# Patient Record
Sex: MALE | Race: WHITE | Employment: FULL TIME | URBAN - METROPOLITAN AREA
[De-identification: names, ages, dates, MRNs, and addresses within clinical notes are randomized per-mention and may not be internally consistent; named-entity substitution may affect disease eponyms.]

---

## 2020-08-12 LAB — HBA1C MFR BLD HPLC: 8 %

## 2022-07-20 RX ORDER — ALLOPURINOL 100 MG/1
100 TABLET ORAL DAILY
Qty: 30 TABLET | Refills: 0 | Status: CANCELLED | OUTPATIENT
Start: 2022-07-20

## 2022-07-20 RX ORDER — ALLOPURINOL 100 MG/1
100 TABLET ORAL DAILY
Qty: 90 TABLET | Refills: 3 | Status: SHIPPED | OUTPATIENT
Start: 2022-07-20

## 2022-09-26 RX ORDER — TRIAMTERENE AND HYDROCHLOROTHIAZIDE 37.5; 25 MG/1; MG/1
TABLET ORAL
Qty: 90 TABLET | Refills: 0 | Status: SHIPPED | OUTPATIENT
Start: 2022-09-26

## 2022-12-26 RX ORDER — TRIAMTERENE AND HYDROCHLOROTHIAZIDE 37.5; 25 MG/1; MG/1
TABLET ORAL
Qty: 90 TABLET | Refills: 0 | OUTPATIENT
Start: 2022-12-26

## 2023-03-15 ENCOUNTER — HOSPITAL ENCOUNTER (INPATIENT)
Age: 68
LOS: 12 days | Discharge: HOME HEALTH | DRG: 215 | End: 2023-03-27
Attending: INTERNAL MEDICINE | Admitting: THORACIC SURGERY (CARDIOTHORACIC VASCULAR SURGERY)
Payer: COMMERCIAL

## 2023-03-15 ENCOUNTER — APPOINTMENT (OUTPATIENT)
Dept: NON INVASIVE DIAGNOSTICS | Age: 68
DRG: 215 | End: 2023-03-15
Payer: COMMERCIAL

## 2023-03-15 ENCOUNTER — HOSPITAL ENCOUNTER (EMERGENCY)
Age: 68
Discharge: ANOTHER HOSPITAL | End: 2023-03-15
Attending: EMERGENCY MEDICINE
Payer: COMMERCIAL

## 2023-03-15 ENCOUNTER — APPOINTMENT (OUTPATIENT)
Dept: GENERAL RADIOLOGY | Age: 68
End: 2023-03-15
Payer: COMMERCIAL

## 2023-03-15 ENCOUNTER — APPOINTMENT (OUTPATIENT)
Dept: ULTRASOUND IMAGING | Age: 68
DRG: 215 | End: 2023-03-15
Payer: COMMERCIAL

## 2023-03-15 VITALS
TEMPERATURE: 98 F | BODY MASS INDEX: 30.88 KG/M2 | HEIGHT: 72 IN | HEART RATE: 89 BPM | DIASTOLIC BLOOD PRESSURE: 90 MMHG | RESPIRATION RATE: 25 BRPM | WEIGHT: 228 LBS | OXYGEN SATURATION: 98 % | SYSTOLIC BLOOD PRESSURE: 186 MMHG

## 2023-03-15 DIAGNOSIS — I21.3 ST ELEVATION MYOCARDIAL INFARCTION (STEMI), UNSPECIFIED ARTERY (HCC): ICD-10-CM

## 2023-03-15 DIAGNOSIS — I25.10 CORONARY ARTERY DISEASE INVOLVING NATIVE HEART, UNSPECIFIED VESSEL OR LESION TYPE, UNSPECIFIED WHETHER ANGINA PRESENT: ICD-10-CM

## 2023-03-15 DIAGNOSIS — I44.1 MOBITZ (TYPE) I (WENCKEBACH'S) ATRIOVENTRICULAR BLOCK: ICD-10-CM

## 2023-03-15 DIAGNOSIS — R05.9 COUGH, UNSPECIFIED TYPE: ICD-10-CM

## 2023-03-15 DIAGNOSIS — I21.3 STEMI (ST ELEVATION MYOCARDIAL INFARCTION) (HCC): ICD-10-CM

## 2023-03-15 DIAGNOSIS — R07.9 CHEST PAIN, UNSPECIFIED TYPE: Primary | ICD-10-CM

## 2023-03-15 DIAGNOSIS — I21.4 ACUTE MYOCARDIAL INFARCTION, SUBENDOCARDIAL INFARCTION, INITIAL EPISODE OF CARE (HCC): ICD-10-CM

## 2023-03-15 DIAGNOSIS — Z95.1 S/P CABG X 4: ICD-10-CM

## 2023-03-15 DIAGNOSIS — I25.10 CORONARY ARTERY DISEASE INVOLVING NATIVE CORONARY ARTERY OF NATIVE HEART WITHOUT ANGINA PECTORIS: Primary | ICD-10-CM

## 2023-03-15 LAB
ALBUMIN SERPL-MCNC: 4.4 G/DL (ref 3.2–4.6)
ALBUMIN/GLOB SERPL: 1.1 (ref 0.4–1.6)
ALP SERPL-CCNC: 81 U/L (ref 45–117)
ALT SERPL-CCNC: 61 U/L (ref 13–61)
ANION GAP SERPL CALC-SCNC: 12 MMOL/L (ref 2–11)
APPEARANCE UR: CLEAR
AST SERPL-CCNC: 84 U/L (ref 15–37)
B PERT DNA SPEC QL NAA+PROBE: NOT DETECTED
BACTERIA SPEC CULT: ABNORMAL
BASOPHILS # BLD: 0 K/UL (ref 0–0.2)
BASOPHILS NFR BLD: 0 % (ref 0–2)
BILIRUB SERPL-MCNC: 0.4 MG/DL (ref 0.2–1.1)
BILIRUB UR QL: NEGATIVE
BORDETELLA PARAPERTUSSIS BY PCR: NOT DETECTED
BUN SERPL-MCNC: 35 MG/DL (ref 7–18)
C PNEUM DNA SPEC QL NAA+PROBE: NOT DETECTED
CALCIUM SERPL-MCNC: 9.9 MG/DL (ref 8.3–10.4)
CHLORIDE SERPL-SCNC: 103 MMOL/L (ref 98–107)
CO2 SERPL-SCNC: 24 MMOL/L (ref 21–32)
COLOR UR: NORMAL
CREAT SERPL-MCNC: 2.17 MG/DL (ref 0.8–1.5)
CREAT UR-MCNC: 45 MG/DL
DIFFERENTIAL METHOD BLD: ABNORMAL
ECHO AO ASC DIAM: 3.2 CM
ECHO AO ASCENDING AORTA INDEX: 1.42 CM/M2
ECHO AO ROOT DIAM: 3.4 CM
ECHO AO ROOT INDEX: 1.51 CM/M2
ECHO AV ACCELERATION TIME: 56 MS
ECHO AV AREA PEAK VELOCITY: 1.8 CM2
ECHO AV AREA VTI: 1.8 CM2
ECHO AV AREA/BSA PEAK VELOCITY: 0.8 CM2/M2
ECHO AV AREA/BSA VTI: 0.8 CM2/M2
ECHO AV MEAN GRADIENT: 12 MMHG
ECHO AV MEAN VELOCITY: 1.6 M/S
ECHO AV PEAK GRADIENT: 19 MMHG
ECHO AV PEAK VELOCITY: 2.2 M/S
ECHO AV VELOCITY RATIO: 0.5
ECHO AV VTI: 43.9 CM
ECHO BSA: 2.29 M2
ECHO BSA: 2.29 M2
ECHO IVC PROX: 1.8 CM
ECHO LA AREA 2C: 23.6 CM2
ECHO LA AREA 4C: 20.6 CM2
ECHO LA DIAMETER INDEX: 1.91 CM/M2
ECHO LA DIAMETER: 4.3 CM
ECHO LA MAJOR AXIS: 5.5 CM
ECHO LA MINOR AXIS: 5.6 CM
ECHO LA TO AORTIC ROOT RATIO: 1.26
ECHO LA VOL 2C: 79 ML (ref 18–58)
ECHO LA VOL 4C: 62 ML (ref 18–58)
ECHO LA VOL BP: 70 ML (ref 18–58)
ECHO LA VOL/BSA BIPLANE: 31 ML/M2 (ref 16–34)
ECHO LA VOLUME INDEX A2C: 35 ML/M2 (ref 16–34)
ECHO LA VOLUME INDEX A4C: 28 ML/M2 (ref 16–34)
ECHO LV E' LATERAL VELOCITY: 12 CM/S
ECHO LV E' SEPTAL VELOCITY: 10 CM/S
ECHO LV EDV A2C: 115 ML
ECHO LV EDV A4C: 136 ML
ECHO LV EDV INDEX A4C: 60 ML/M2
ECHO LV EDV NDEX A2C: 51 ML/M2
ECHO LV EJECTION FRACTION A2C: 59 %
ECHO LV EJECTION FRACTION A4C: 63 %
ECHO LV EJECTION FRACTION BIPLANE: 62 % (ref 55–100)
ECHO LV ESV A2C: 48 ML
ECHO LV ESV A4C: 50 ML
ECHO LV ESV INDEX A2C: 21 ML/M2
ECHO LV ESV INDEX A4C: 22 ML/M2
ECHO LV FRACTIONAL SHORTENING: 42 % (ref 28–44)
ECHO LV INTERNAL DIMENSION DIASTOLE INDEX: 2.31 CM/M2
ECHO LV INTERNAL DIMENSION DIASTOLIC: 5.2 CM (ref 4.2–5.9)
ECHO LV INTERNAL DIMENSION SYSTOLIC INDEX: 1.33 CM/M2
ECHO LV INTERNAL DIMENSION SYSTOLIC: 3 CM
ECHO LV IVSD: 1 CM (ref 0.6–1)
ECHO LV MASS 2D: 194.2 G (ref 88–224)
ECHO LV MASS INDEX 2D: 86.3 G/M2 (ref 49–115)
ECHO LV POSTERIOR WALL DIASTOLIC: 1 CM (ref 0.6–1)
ECHO LV RELATIVE WALL THICKNESS RATIO: 0.38
ECHO LVOT AREA: 3.5 CM2
ECHO LVOT AV VTI INDEX: 0.52
ECHO LVOT DIAM: 2.1 CM
ECHO LVOT MEAN GRADIENT: 3 MMHG
ECHO LVOT PEAK GRADIENT: 5 MMHG
ECHO LVOT PEAK VELOCITY: 1.1 M/S
ECHO LVOT STROKE VOLUME INDEX: 35.2 ML/M2
ECHO LVOT SV: 79.3 ML
ECHO LVOT VTI: 22.9 CM
ECHO MV A VELOCITY: 1.21 M/S
ECHO MV AREA VTI: 1.4 CM2
ECHO MV E DECELERATION TIME (DT): 107 MS
ECHO MV E VELOCITY: 1.48 M/S
ECHO MV E/A RATIO: 1.22
ECHO MV E/E' LATERAL: 12.33
ECHO MV E/E' RATIO (AVERAGED): 13.57
ECHO MV E/E' SEPTAL: 14.8
ECHO MV EROA PISA: 16.5 CM2
ECHO MV LVOT VTI INDEX: 2.48
ECHO MV MAX VELOCITY: 1.7 M/S
ECHO MV MEAN GRADIENT: 2 MMHG
ECHO MV MEAN VELOCITY: 0.6 M/S
ECHO MV PEAK GRADIENT: 11 MMHG
ECHO MV REGURGITANT ALIASING (NYQUIST) VELOCITY: 38 CM/S
ECHO MV REGURGITANT PEAK GRADIENT: 108 MMHG
ECHO MV REGURGITANT PEAK VELOCITY: 5.2 M/S
ECHO MV REGURGITANT RADIUS PISA: 0.6 CM
ECHO MV REGURGITANT VOLUME PISA: 2494.71 ML
ECHO MV REGURGITANT VTIA: 151 CM
ECHO MV VTI: 56.8 CM
ECHO PV ACCELERATION TIME (AT): 82 MS
ECHO PV MAX VELOCITY: 1 M/S
ECHO PV PEAK GRADIENT: 4 MMHG
ECHO RV BASAL DIMENSION: 3.2 CM
ECHO RV FREE WALL PEAK S': 15 CM/S
ECHO RV TAPSE: 1.5 CM (ref 1.7–?)
EKG ATRIAL RATE: 93 BPM
EKG DIAGNOSIS: NORMAL
EKG P AXIS: 58 DEGREES
EKG P-R INTERVAL: 224 MS
EKG Q-T INTERVAL: 364 MS
EKG QRS DURATION: 108 MS
EKG QTC CALCULATION (BAZETT): 390 MS
EKG R AXIS: -3 DEGREES
EKG T AXIS: 57 DEGREES
EKG VENTRICULAR RATE: 69 BPM
EOSINOPHIL # BLD: 0.1 K/UL (ref 0–0.8)
EOSINOPHIL NFR BLD: 2 % (ref 0.5–7.8)
ERYTHROCYTE [DISTWIDTH] IN BLOOD BY AUTOMATED COUNT: 13.2 % (ref 11.9–14.6)
FLUAV RNA SPEC QL NAA+PROBE: NOT DETECTED
FLUAV SUBTYP SPEC NAA+PROBE: NOT DETECTED
FLUBV RNA SPEC QL NAA+PROBE: NOT DETECTED
FLUBV RNA SPEC QL NAA+PROBE: NOT DETECTED
GLOBULIN SER CALC-MCNC: 3.9 G/DL (ref 2.8–4.5)
GLUCOSE BLD STRIP.AUTO-MCNC: 120 MG/DL (ref 65–100)
GLUCOSE BLD STRIP.AUTO-MCNC: 126 MG/DL (ref 65–100)
GLUCOSE BLD STRIP.AUTO-MCNC: 217 MG/DL (ref 65–100)
GLUCOSE SERPL-MCNC: 204 MG/DL (ref 65–100)
GLUCOSE UR STRIP.AUTO-MCNC: NEGATIVE MG/DL
HADV DNA SPEC QL NAA+PROBE: NOT DETECTED
HCOV 229E RNA SPEC QL NAA+PROBE: NOT DETECTED
HCOV HKU1 RNA SPEC QL NAA+PROBE: NOT DETECTED
HCOV NL63 RNA SPEC QL NAA+PROBE: NOT DETECTED
HCOV OC43 RNA SPEC QL NAA+PROBE: NOT DETECTED
HCT VFR BLD AUTO: 34.3 % (ref 41.1–50.3)
HGB BLD-MCNC: 11.4 G/DL (ref 13.6–17.2)
HGB UR QL STRIP: NEGATIVE
HMPV RNA SPEC QL NAA+PROBE: NOT DETECTED
HPIV1 RNA SPEC QL NAA+PROBE: NOT DETECTED
HPIV2 RNA SPEC QL NAA+PROBE: NOT DETECTED
HPIV3 RNA SPEC QL NAA+PROBE: NOT DETECTED
HPIV4 RNA SPEC QL NAA+PROBE: NOT DETECTED
IMM GRANULOCYTES # BLD AUTO: 0 K/UL (ref 0–0.5)
IMM GRANULOCYTES NFR BLD AUTO: 0 % (ref 0–5)
KETONES UR QL STRIP.AUTO: NEGATIVE MG/DL
LEUKOCYTE ESTERASE UR QL STRIP.AUTO: NEGATIVE
LV EF: 53 %
LVEF MODALITY: ABNORMAL
LYMPHOCYTES # BLD: 0.8 K/UL (ref 0.5–4.6)
LYMPHOCYTES NFR BLD: 12 % (ref 13–44)
M PNEUMO DNA SPEC QL NAA+PROBE: NOT DETECTED
MAGNESIUM SERPL-MCNC: 2.3 MG/DL (ref 1.2–2.6)
MCH RBC QN AUTO: 30.6 PG (ref 26.1–32.9)
MCHC RBC AUTO-ENTMCNC: 33.2 G/DL (ref 31.4–35)
MCV RBC AUTO: 92.2 FL (ref 82–102)
MONOCYTES # BLD: 0.6 K/UL (ref 0.1–1.3)
MONOCYTES NFR BLD: 9 % (ref 4–12)
NEUTS SEG # BLD: 5.2 K/UL (ref 1.7–8.2)
NEUTS SEG NFR BLD: 77 % (ref 43–78)
NITRITE UR QL STRIP.AUTO: NEGATIVE
NRBC # BLD: 0 K/UL (ref 0–0.2)
PH UR STRIP: 7 (ref 5–9)
PLATELET # BLD AUTO: 212 K/UL (ref 150–450)
PMV BLD AUTO: 10.5 FL (ref 9.4–12.3)
POTASSIUM SERPL-SCNC: 4.4 MMOL/L (ref 3.5–5.1)
PROT SERPL-MCNC: 8.3 G/DL (ref 6.4–8.2)
PROT UR STRIP-MCNC: NEGATIVE MG/DL
PROT UR-MCNC: 9 MG/DL
PROT/CREAT UR-RTO: 0.2
RBC # BLD AUTO: 3.72 M/UL (ref 4.23–5.6)
RSV RNA SPEC QL NAA+PROBE: NOT DETECTED
RV+EV RNA SPEC QL NAA+PROBE: NOT DETECTED
SARS-COV-2 RDRP RESP QL NAA+PROBE: NOT DETECTED
SARS-COV-2 RNA RESP QL NAA+PROBE: NOT DETECTED
SERVICE CMNT-IMP: ABNORMAL
SODIUM SERPL-SCNC: 139 MMOL/L (ref 133–143)
SOURCE: NORMAL
SP GR UR REFRACTOMETRY: 1.02 (ref 1–1.02)
TROPONIN I SERPL HS-MCNC: 6723.4 PG/ML (ref 0–57)
TROPONIN I SERPL HS-MCNC: 7040.5 PG/ML (ref 0–57)
TROPONIN T SERPL HS-MCNC: 1419 NG/L (ref 0–22)
UROBILINOGEN UR QL STRIP.AUTO: 1 EU/DL (ref 0.2–1)
WBC # BLD AUTO: 6.8 K/UL (ref 4.3–11.1)

## 2023-03-15 PROCEDURE — 0202U NFCT DS 22 TRGT SARS-COV-2: CPT

## 2023-03-15 PROCEDURE — 6360000002 HC RX W HCPCS: Performed by: EMERGENCY MEDICINE

## 2023-03-15 PROCEDURE — 99152 MOD SED SAME PHYS/QHP 5/>YRS: CPT | Performed by: INTERNAL MEDICINE

## 2023-03-15 PROCEDURE — 71045 X-RAY EXAM CHEST 1 VIEW: CPT

## 2023-03-15 PROCEDURE — 6360000002 HC RX W HCPCS: Performed by: INTERNAL MEDICINE

## 2023-03-15 PROCEDURE — 4A023N7 MEASUREMENT OF CARDIAC SAMPLING AND PRESSURE, LEFT HEART, PERCUTANEOUS APPROACH: ICD-10-PCS | Performed by: INTERNAL MEDICINE

## 2023-03-15 PROCEDURE — 84156 ASSAY OF PROTEIN URINE: CPT

## 2023-03-15 PROCEDURE — 93306 TTE W/DOPPLER COMPLETE: CPT | Performed by: INTERNAL MEDICINE

## 2023-03-15 PROCEDURE — B2111ZZ FLUOROSCOPY OF MULTIPLE CORONARY ARTERIES USING LOW OSMOLAR CONTRAST: ICD-10-PCS | Performed by: INTERNAL MEDICINE

## 2023-03-15 PROCEDURE — C1769 GUIDE WIRE: HCPCS | Performed by: INTERNAL MEDICINE

## 2023-03-15 PROCEDURE — 84484 ASSAY OF TROPONIN QUANT: CPT

## 2023-03-15 PROCEDURE — B2131ZZ FLUOROSCOPY OF MULTIPLE CORONARY ARTERY BYPASS GRAFTS USING LOW OSMOLAR CONTRAST: ICD-10-PCS | Performed by: INTERNAL MEDICINE

## 2023-03-15 PROCEDURE — 96374 THER/PROPH/DIAG INJ IV PUSH: CPT

## 2023-03-15 PROCEDURE — 85520 HEPARIN ASSAY: CPT

## 2023-03-15 PROCEDURE — 82962 GLUCOSE BLOOD TEST: CPT

## 2023-03-15 PROCEDURE — 6360000004 HC RX CONTRAST MEDICATION: Performed by: INTERNAL MEDICINE

## 2023-03-15 PROCEDURE — 2500000003 HC RX 250 WO HCPCS: Performed by: INTERNAL MEDICINE

## 2023-03-15 PROCEDURE — 87502 INFLUENZA DNA AMP PROBE: CPT

## 2023-03-15 PROCEDURE — 80053 COMPREHEN METABOLIC PANEL: CPT

## 2023-03-15 PROCEDURE — 93458 L HRT ARTERY/VENTRICLE ANGIO: CPT | Performed by: INTERNAL MEDICINE

## 2023-03-15 PROCEDURE — 6370000000 HC RX 637 (ALT 250 FOR IP): Performed by: NURSE PRACTITIONER

## 2023-03-15 PROCEDURE — 1100000003 HC PRIVATE W/ TELEMETRY

## 2023-03-15 PROCEDURE — 93306 TTE W/DOPPLER COMPLETE: CPT

## 2023-03-15 PROCEDURE — 99285 EMERGENCY DEPT VISIT HI MDM: CPT

## 2023-03-15 PROCEDURE — 4500000002 HC ER NO CHARGE: Performed by: THORACIC SURGERY (CARDIOTHORACIC VASCULAR SURGERY)

## 2023-03-15 PROCEDURE — 87641 MR-STAPH DNA AMP PROBE: CPT

## 2023-03-15 PROCEDURE — 99223 1ST HOSP IP/OBS HIGH 75: CPT | Performed by: INTERNAL MEDICINE

## 2023-03-15 PROCEDURE — 85025 COMPLETE CBC W/AUTO DIFF WBC: CPT

## 2023-03-15 PROCEDURE — 87635 SARS-COV-2 COVID-19 AMP PRB: CPT

## 2023-03-15 PROCEDURE — 2580000003 HC RX 258: Performed by: NURSE PRACTITIONER

## 2023-03-15 PROCEDURE — 2709999900 HC NON-CHARGEABLE SUPPLY: Performed by: INTERNAL MEDICINE

## 2023-03-15 PROCEDURE — 81003 URINALYSIS AUTO W/O SCOPE: CPT

## 2023-03-15 PROCEDURE — 6370000000 HC RX 637 (ALT 250 FOR IP)

## 2023-03-15 PROCEDURE — 36415 COLL VENOUS BLD VENIPUNCTURE: CPT

## 2023-03-15 PROCEDURE — 83735 ASSAY OF MAGNESIUM: CPT

## 2023-03-15 PROCEDURE — B2151ZZ FLUOROSCOPY OF LEFT HEART USING LOW OSMOLAR CONTRAST: ICD-10-PCS | Performed by: INTERNAL MEDICINE

## 2023-03-15 PROCEDURE — 76770 US EXAM ABDO BACK WALL COMP: CPT

## 2023-03-15 PROCEDURE — 96365 THER/PROPH/DIAG IV INF INIT: CPT

## 2023-03-15 PROCEDURE — C1894 INTRO/SHEATH, NON-LASER: HCPCS | Performed by: INTERNAL MEDICINE

## 2023-03-15 RX ORDER — INSULIN LISPRO 100 [IU]/ML
0-8 INJECTION, SOLUTION INTRAVENOUS; SUBCUTANEOUS
Status: DISCONTINUED | OUTPATIENT
Start: 2023-03-15 | End: 2023-03-20

## 2023-03-15 RX ORDER — SODIUM CHLORIDE 0.9 % (FLUSH) 0.9 %
5-40 SYRINGE (ML) INJECTION EVERY 12 HOURS SCHEDULED
Status: DISCONTINUED | OUTPATIENT
Start: 2023-03-15 | End: 2023-03-24 | Stop reason: SDUPTHER

## 2023-03-15 RX ORDER — MIDAZOLAM HYDROCHLORIDE 1 MG/ML
INJECTION INTRAMUSCULAR; INTRAVENOUS PRN
Status: DISCONTINUED | OUTPATIENT
Start: 2023-03-15 | End: 2023-03-15 | Stop reason: HOSPADM

## 2023-03-15 RX ORDER — NITROGLYCERIN 0.4 MG/1
0.4 TABLET SUBLINGUAL EVERY 5 MIN PRN
Status: DISCONTINUED | OUTPATIENT
Start: 2023-03-15 | End: 2023-03-24

## 2023-03-15 RX ORDER — ACETAMINOPHEN 325 MG/1
650 TABLET ORAL EVERY 6 HOURS PRN
Status: DISCONTINUED | OUTPATIENT
Start: 2023-03-15 | End: 2023-03-18

## 2023-03-15 RX ORDER — POTASSIUM CHLORIDE 20 MEQ/1
40 TABLET, EXTENDED RELEASE ORAL PRN
Status: DISCONTINUED | OUTPATIENT
Start: 2023-03-15 | End: 2023-03-27 | Stop reason: HOSPADM

## 2023-03-15 RX ORDER — ONDANSETRON 2 MG/ML
4 INJECTION INTRAMUSCULAR; INTRAVENOUS EVERY 6 HOURS PRN
Status: DISCONTINUED | OUTPATIENT
Start: 2023-03-15 | End: 2023-03-24 | Stop reason: SDUPTHER

## 2023-03-15 RX ORDER — ASPIRIN 81 MG/1
243 TABLET, CHEWABLE ORAL DAILY
Status: DISCONTINUED | OUTPATIENT
Start: 2023-03-15 | End: 2023-03-15 | Stop reason: HOSPADM

## 2023-03-15 RX ORDER — HEPARIN SODIUM 1000 [USP'U]/ML
4000 INJECTION, SOLUTION INTRAVENOUS; SUBCUTANEOUS ONCE
Status: COMPLETED | OUTPATIENT
Start: 2023-03-15 | End: 2023-03-15

## 2023-03-15 RX ORDER — ACETAMINOPHEN 650 MG/1
650 SUPPOSITORY RECTAL EVERY 6 HOURS PRN
Status: DISCONTINUED | OUTPATIENT
Start: 2023-03-15 | End: 2023-03-18

## 2023-03-15 RX ORDER — LANOLIN ALCOHOL/MO/W.PET/CERES
1000 CREAM (GRAM) TOPICAL DAILY
Status: DISCONTINUED | OUTPATIENT
Start: 2023-03-15 | End: 2023-03-15 | Stop reason: RX

## 2023-03-15 RX ORDER — SODIUM CHLORIDE 9 MG/ML
INJECTION, SOLUTION INTRAVENOUS PRN
Status: DISCONTINUED | OUTPATIENT
Start: 2023-03-15 | End: 2023-03-24 | Stop reason: SDUPTHER

## 2023-03-15 RX ORDER — FENOFIBRATE 160 MG/1
160 TABLET ORAL DAILY
Status: DISCONTINUED | OUTPATIENT
Start: 2023-03-16 | End: 2023-03-27 | Stop reason: HOSPADM

## 2023-03-15 RX ORDER — ROSUVASTATIN CALCIUM 20 MG/1
40 TABLET, COATED ORAL NIGHTLY
Status: DISCONTINUED | OUTPATIENT
Start: 2023-03-15 | End: 2023-03-17 | Stop reason: SDUPTHER

## 2023-03-15 RX ORDER — ALLOPURINOL 100 MG/1
100 TABLET ORAL DAILY
Status: DISCONTINUED | OUTPATIENT
Start: 2023-03-16 | End: 2023-03-18

## 2023-03-15 RX ORDER — ASPIRIN 81 MG/1
81 TABLET, CHEWABLE ORAL DAILY
COMMUNITY

## 2023-03-15 RX ORDER — HEPARIN SODIUM 10000 [USP'U]/100ML
5-30 INJECTION, SOLUTION INTRAVENOUS CONTINUOUS
Status: DISCONTINUED | OUTPATIENT
Start: 2023-03-15 | End: 2023-03-18

## 2023-03-15 RX ORDER — ASPIRIN 81 MG/1
TABLET, CHEWABLE ORAL
Status: COMPLETED
Start: 2023-03-15 | End: 2023-03-15

## 2023-03-15 RX ORDER — PANTOPRAZOLE SODIUM 40 MG/1
40 TABLET, DELAYED RELEASE ORAL
Status: DISCONTINUED | OUTPATIENT
Start: 2023-03-16 | End: 2023-03-16

## 2023-03-15 RX ORDER — SODIUM CHLORIDE 9 MG/ML
INJECTION, SOLUTION INTRAVENOUS CONTINUOUS
Status: DISCONTINUED | OUTPATIENT
Start: 2023-03-15 | End: 2023-03-24

## 2023-03-15 RX ORDER — HEPARIN SODIUM 1000 [USP'U]/ML
4000 INJECTION, SOLUTION INTRAVENOUS; SUBCUTANEOUS PRN
Status: DISCONTINUED | OUTPATIENT
Start: 2023-03-15 | End: 2023-03-18

## 2023-03-15 RX ORDER — ASPIRIN 81 MG/1
244 TABLET, CHEWABLE ORAL DAILY
Status: DISCONTINUED | OUTPATIENT
Start: 2023-03-15 | End: 2023-03-15

## 2023-03-15 RX ORDER — ONDANSETRON 4 MG/1
4 TABLET, ORALLY DISINTEGRATING ORAL EVERY 8 HOURS PRN
Status: DISCONTINUED | OUTPATIENT
Start: 2023-03-15 | End: 2023-03-24 | Stop reason: SDUPTHER

## 2023-03-15 RX ORDER — INSULIN LISPRO 100 [IU]/ML
0-4 INJECTION, SOLUTION INTRAVENOUS; SUBCUTANEOUS NIGHTLY
Status: DISCONTINUED | OUTPATIENT
Start: 2023-03-15 | End: 2023-03-20

## 2023-03-15 RX ORDER — HEPARIN SODIUM 200 [USP'U]/100ML
INJECTION, SOLUTION INTRAVENOUS CONTINUOUS PRN
Status: COMPLETED | OUTPATIENT
Start: 2023-03-15 | End: 2023-03-15

## 2023-03-15 RX ORDER — HEPARIN SODIUM 10000 [USP'U]/100ML
7 INJECTION, SOLUTION INTRAVENOUS CONTINUOUS
Status: DISCONTINUED | OUTPATIENT
Start: 2023-03-15 | End: 2023-03-15 | Stop reason: HOSPADM

## 2023-03-15 RX ORDER — HEPARIN SODIUM 1000 [USP'U]/ML
1000 INJECTION, SOLUTION INTRAVENOUS; SUBCUTANEOUS
Status: DISCONTINUED | OUTPATIENT
Start: 2023-03-15 | End: 2023-03-15

## 2023-03-15 RX ORDER — POLYETHYLENE GLYCOL 3350 17 G/17G
17 POWDER, FOR SOLUTION ORAL DAILY PRN
Status: DISCONTINUED | OUTPATIENT
Start: 2023-03-15 | End: 2023-03-24

## 2023-03-15 RX ORDER — DEXTROSE MONOHYDRATE 100 MG/ML
INJECTION, SOLUTION INTRAVENOUS CONTINUOUS PRN
Status: DISCONTINUED | OUTPATIENT
Start: 2023-03-15 | End: 2023-03-24 | Stop reason: SDUPTHER

## 2023-03-15 RX ORDER — POTASSIUM CHLORIDE 7.45 MG/ML
10 INJECTION INTRAVENOUS PRN
Status: DISCONTINUED | OUTPATIENT
Start: 2023-03-15 | End: 2023-03-24

## 2023-03-15 RX ORDER — SODIUM CHLORIDE 0.9 % (FLUSH) 0.9 %
5-40 SYRINGE (ML) INJECTION PRN
Status: DISCONTINUED | OUTPATIENT
Start: 2023-03-15 | End: 2023-03-24 | Stop reason: SDUPTHER

## 2023-03-15 RX ORDER — MAGNESIUM SULFATE IN WATER 40 MG/ML
2000 INJECTION, SOLUTION INTRAVENOUS PRN
Status: DISCONTINUED | OUTPATIENT
Start: 2023-03-15 | End: 2023-03-24

## 2023-03-15 RX ORDER — ATORVASTATIN CALCIUM 80 MG/1
80 TABLET, FILM COATED ORAL DAILY
COMMUNITY

## 2023-03-15 RX ORDER — LIDOCAINE HYDROCHLORIDE 10 MG/ML
INJECTION, SOLUTION INFILTRATION; PERINEURAL PRN
Status: DISCONTINUED | OUTPATIENT
Start: 2023-03-15 | End: 2023-03-15 | Stop reason: HOSPADM

## 2023-03-15 RX ORDER — HEPARIN SODIUM 1000 [USP'U]/ML
2000 INJECTION, SOLUTION INTRAVENOUS; SUBCUTANEOUS PRN
Status: DISCONTINUED | OUTPATIENT
Start: 2023-03-15 | End: 2023-03-18

## 2023-03-15 RX ORDER — LISINOPRIL 20 MG/1
20 TABLET ORAL DAILY
Status: CANCELLED | OUTPATIENT
Start: 2023-03-15

## 2023-03-15 RX ORDER — ASPIRIN 81 MG/1
81 TABLET, CHEWABLE ORAL DAILY
Status: DISCONTINUED | OUTPATIENT
Start: 2023-03-16 | End: 2023-03-17 | Stop reason: SDUPTHER

## 2023-03-15 RX ORDER — NITROGLYCERIN 0.4 MG/1
0.4 TABLET SUBLINGUAL EVERY 5 MIN PRN
Status: CANCELLED | OUTPATIENT
Start: 2023-03-15

## 2023-03-15 RX ORDER — MAGNESIUM HYDROXIDE/ALUMINUM HYDROXICE/SIMETHICONE 120; 1200; 1200 MG/30ML; MG/30ML; MG/30ML
30 SUSPENSION ORAL EVERY 6 HOURS PRN
Status: DISCONTINUED | OUTPATIENT
Start: 2023-03-15 | End: 2023-03-27 | Stop reason: HOSPADM

## 2023-03-15 RX ADMIN — SODIUM CHLORIDE: 9 INJECTION, SOLUTION INTRAVENOUS at 14:39

## 2023-03-15 RX ADMIN — ASPIRIN 243 MG: 81 TABLET, CHEWABLE ORAL at 12:00

## 2023-03-15 RX ADMIN — HEPARIN SODIUM 7 UNITS/KG/HR: 10000 INJECTION, SOLUTION INTRAVENOUS at 11:44

## 2023-03-15 RX ADMIN — SODIUM CHLORIDE, PRESERVATIVE FREE 5 ML: 5 INJECTION INTRAVENOUS at 21:18

## 2023-03-15 RX ADMIN — ROSUVASTATIN CALCIUM 40 MG: 20 TABLET, COATED ORAL at 21:18

## 2023-03-15 RX ADMIN — HEPARIN SODIUM 4000 UNITS: 1000 INJECTION INTRAVENOUS; SUBCUTANEOUS at 11:41

## 2023-03-15 ASSESSMENT — ENCOUNTER SYMPTOMS
WHEEZING: 0
NAUSEA: 0
RIGHT EYE: 0
LEFT EYE: 0
SORE THROAT: 0
SHORTNESS OF BREATH: 0
SPUTUM PRODUCTION: 0
HEMATEMESIS: 0
ORTHOPNEA: 0
BLURRED VISION: 0
VOMITING: 0
CHEST TIGHTNESS: 1
ABDOMINAL PAIN: 0
NAUSEA: 0
VOICE CHANGE: 0
DIARRHEA: 0
BACK PAIN: 0
COUGH: 1
COUGH: 1
EYE REDNESS: 0
BACK PAIN: 0
SHORTNESS OF BREATH: 1
HEMOPTYSIS: 0
HEARTBURN: 0
HEARTBURN: 0
SHORTNESS OF BREATH: 0
COLOR CHANGE: 0
COLOR CHANGE: 0
VOMITING: 0
EYES NEGATIVE: 1
ABDOMINAL PAIN: 0
CONSTIPATION: 0

## 2023-03-15 ASSESSMENT — PAIN SCALES - GENERAL: PAINLEVEL_OUTOF10: 0

## 2023-03-15 ASSESSMENT — PAIN - FUNCTIONAL ASSESSMENT: PAIN_FUNCTIONAL_ASSESSMENT: NONE - DENIES PAIN

## 2023-03-15 NOTE — PROGRESS NOTES
TRANSFER - OUT REPORT:    Verbal report given to [facility] on [facility]  being transferred to  for routine progression of patient care       Report consisted of patient's Situation, Background, Assessment and   Recommendations(SBAR). Information from the following report(s) Nurse Handoff Report was reviewed with the receiving nurse. [place] Assessment: No data recorded  Lines:   Peripheral IV 03/15/23 Right Antecubital (Active)   Site Assessment Clean, dry & intact 03/15/23 1108       Peripheral IV 03/15/23 Left Antecubital (Active)   Site Assessment Clean, dry & intact 03/15/23 1134   Dressing Status Clean, dry & intact 03/15/23 1134        Opportunity for questions and clarification was provided.       Patient transported with:  Registered Nurse

## 2023-03-15 NOTE — PROGRESS NOTES
Report received from [address] [place] [address]. Procedural findings communicated. Intra procedural  medication administration reviewed. Progression of care discussed.      Patient received into [id number] [facility] post sheath removal.     Access site without bleeding or swelling yes    Dressing dry and intact yes    Patient instructed to limit movement to right upper extremity    Routine post procedural vital signs and site assessment initiated yes

## 2023-03-15 NOTE — Clinical Note
Aspirin Registry Question:   Aspirin was given prior to the procedure.    [email or web address]

## 2023-03-15 NOTE — ED TRIAGE NOTES
Patient ambulatory to ED with complaint of chest pain that started Saturday. Patient was walking around work yesterday when he was in the back of the building and felt as though he couldn't make it back to the front of the building. Patient took a rest and was able to catch his breath and felt better. Patient's cardiologist isn't available at this time and advised to go to the ER.  Patient states he feels like he was sucker punched in the chest.

## 2023-03-15 NOTE — CONSULTS
Comprehensive Nutrition Assessment    Type and Reason for Visit: Initial, Consult  Diet Education (Cardiology)    Nutrition Recommendations/Plan:   Nutrition Education    Educated on Carbohydrate counting for diabetes and low sodium diet for heart health  Learners: Patient and Family  Readiness: Acceptance  Method: Explanation and Handout  Response: Verbalizes Understanding    Meals and Snacks:  Diet: Continue current order  Nutrition Supplement Therapy:  Medical food supplement therapy:  None - not indicated at this time     Malnutrition Assessment:  Malnutrition Status: No malnutrition  no norman wasting of fat or muscle stores noted    Nutrition Assessment:  Nutrition History: Pt reports prior education on diabetic/heart healthy diet but admits to limited adherence to these recommendations. States he likes a lot of foods that do not meet these guidelines and he would rather continue eating these foods. A normal day is coffee for breakfast, poptarts or powedered sugar donuts at work, small lunch, and meat and starch dinner with family in the evening. Pt states he tends to graze throughout the day and does not follow any specific type of diet. Denies any appetite changes. States his weight has been increasing the last few months over the winter d/t decrease in exercise. Do You Have Any Cultural, Yazidism, or Ethnic Food Preferences?: No   Nutrition Background:       PMH significant for T2DM, GERD, gout, hypercholesterolemia, and thyroid disease. Pt presents this admission for NSTEMI. Nutrition Interval:  RD met w/pt and family in room. Completed diabetic carbohydrate counting education and heart healthy/low sodium diet education. Pt verbalizes understanding to materials discussed. Pt given handouts to review and take home with him. Recommended ADA (American Diabetes Association) and AHA (American Heart Association) for recipes to help implement dietary changes at home.      Current Nutrition Therapies:  Diet

## 2023-03-15 NOTE — PROCEDURES
Brief Cardiac Procedure Note    Patient: Jeannie Lake MRN: [id number]  SSN: [id number]    YOB: 1955  Age: 74 y.o. Sex: male      Date of Procedure: 3/15/2023     Pre-procedure Diagnosis: NSTEMI    Post-procedure Diagnosis: Coronary Artery Disease    Procedure: Left Heart Catheterization    Brief Description of Procedure: As above    Performed By: Jacqueline Larson MD     Assistants: None    Anesthesia: Moderate Sedation    Estimated Blood Loss: Less than 10 mL      Specimens: None    Implants: None    Findings: Severe multivessel coronary artery disease. Occluded mid circumflex which appears to be a left dominant circulation. High-grade OM and LAD stenosis. Appears his mild inferior ST elevation is likely due to recent infarct and akinetic/dyskinetic inferior wall. Patient is pain-free without any ongoing angina for last 24 hours. Left ventriculogram shows inferior dyskinesis. Discussed with CT surgery. Would be best served with multivessel coronary artery bypass grafting. We will admit and placed on aspirin until surgery can be performed. Check echo. Complications: None    Recommendations: Continue medical therapy.     Signed By: Jacqueline Larson MD     March 15, 2023

## 2023-03-15 NOTE — ED NOTES
TRANSFER - OUT REPORT:    Verbal report given to lynda mars on Theamos Wasserman  being transferred to [facility] for urgent transfer       Report consisted of patient's Situation, Background, Assessment and   Recommendations(SBAR). Information from the following report(s) Nurse Handoff Report, ED SBAR, Med Rec Status, and Cardiac Rhythm    was reviewed with the receiving nurse. [place] Assessment: No data recorded  Lines:   Peripheral IV 03/15/23 Right Antecubital (Active)   Site Assessment Clean, dry & intact 03/15/23 1108       Peripheral IV 03/15/23 Left Antecubital (Active)   Site Assessment Clean, dry & intact 03/15/23 1134   Dressing Status Clean, dry & intact 03/15/23 1134        Opportunity for questions and clarification was provided. Patient transported with:  Monitor.  IV pump and with [facility]          Carolyn Villarreal RN  03/15/23 1202

## 2023-03-15 NOTE — ED PROVIDER NOTES
Emergency Department Provider Note                   PCP:                Arnaud Velazquez MD               Age: 74 y.o. Sex: male     DISPOSITION Decision To Transfer 03/15/2023 11:42:35 AM       ICD-10-CM    1. Chest pain, unspecified type  R07.9       2. ST elevation myocardial infarction (STEMI), unspecified artery (HCC)  I21.3           MEDICAL DECISION MAKING  Complexity of Problems Addressed:  1 or more acute illnesses that pose a threat to life or bodily function. Patient does appear to be in a second-degree AV block with occasional widening and a drop beat. However he also appears to have some mild ST elevation in the inferior leads. I have no previous EKG for comparison and he is pain-free at this time. I will try to await a previous EKG for his primary cardiologist office I will not initiate a code STEMI at this time    11:42 AM  I discussed the case with on-call interventional cardiologist decision was made to call a STEMI on patient. I will initiate heparin. Critical Care Time 60 Minutes: Excluding all billable procedures, time spent at the bedside directing patients resucsitation, updating family, and coordinating care with consultants      Data Reviewed and Analyzed:  Category 1:   I reviewed records from an external source: ED records from outside this hospital.  I reviewed records from an external source: provider visit notes from PCP. I reviewed records from an external source: provider visit notes from outside specialist.  I reviewed records from an external source: previous old EKG's reviewed. I reviewed records from an external source: previous lab results from outside ED. I reviewed records from an external source: previous imaging studies from outside ED.   I ordered each unique test.  I reviewed the results of each unique test.    The patients assessment required an independent historian: Daughter at bed side give supplemental history    Category 2:   I independently ordered and reviewed the EKG. I independently ordered and reviewed the X-rays. No obvious pneumonia  I independently ordered and reviewed the labs show a normal white blood cell count elevated troponin    EKG interpretation: Sinus rhythm rate of 69, prolonged SC interval, secondary AV block, Mobitz type I. Some Minimal ST elevation noted in inferior leads. No previous EKG for comparison    Category 3: Discussion of management or test interpretation. Discussed the need for emergent treatment for patient       Risk of Complications and/or Morbidity of Patient Management:  Discussion with external consultants and Shared medical decision making was utilized in creating the patients health plan today. Bladimir Haley is a 74 y.o. male who presents to the Emergency Department with chief complaint of    Chief Complaint   Patient presents with    Chest Pain      Patient presents the ER with complaints of chest pain, cough, fever and shortness of breath. Patient states symptoms started 3 nights ago. Reports he did have some fever then. States intermittently has had some chest discomfort. Patient's last episode of chest discomfort was last evening after taking out the trash. Does report a cardiac history. Reports minimal cough. Denies vomiting. The history is provided by the patient. Chest Pain  Pain location:  Substernal area  Pain quality: pressure    Pain radiates to:  Does not radiate  Pain severity:  Moderate  Onset quality:  Gradual  Duration:  3 days  Timing:  Intermittent  Chronicity:  New  Context: not breathing    Relieved by:  Nothing  Worsened by:  Nothing  Associated symptoms: cough and fever    Associated symptoms: no abdominal pain, no back pain, no heartburn, no lower extremity edema, no nausea, no shortness of breath, no syncope, no vomiting and no weakness       Review of Systems   Constitutional:  Positive for fever. Negative for chills.    HENT:  Negative for congestion, dental problem, tinnitus and voice change. Eyes:  Negative for redness and visual disturbance. Respiratory:  Positive for cough and chest tightness. Negative for shortness of breath. Cardiovascular:  Positive for chest pain. Negative for syncope. Gastrointestinal:  Negative for abdominal pain, heartburn, nausea and vomiting. Endocrine: Negative for polydipsia and polyuria. Genitourinary:  Negative for enuresis, penile swelling and urgency. Musculoskeletal:  Negative for back pain and gait problem. Skin:  Negative for color change and pallor. Neurological:  Negative for tremors and weakness. Hematological:  Negative for adenopathy. Does not bruise/bleed easily. Psychiatric/Behavioral:  Negative for behavioral problems and confusion. All other systems reviewed and are negative. Vitals signs and nursing note reviewed. Patient Vitals for the past 4 hrs:   Temp Pulse Resp BP SpO2   03/15/23 1115 -- 67 22 122/70 96 %   03/15/23 1100 -- 71 23 121/62 98 %   03/15/23 1050 98 °F (36.7 °C) 79 17 (!) 143/82 96 %          Physical Exam  Vitals and nursing note reviewed. Constitutional:       General: He is not in acute distress. Appearance: Normal appearance. He is not ill-appearing. HENT:      Head: Normocephalic and atraumatic. Right Ear: External ear normal.      Left Ear: External ear normal.   Eyes:      Extraocular Movements: Extraocular movements intact. Pupils: Pupils are equal, round, and reactive to light. Cardiovascular:      Rate and Rhythm: Normal rate and regular rhythm. Pulses: Normal pulses. Heart sounds: Normal heart sounds. Pulmonary:      Effort: Pulmonary effort is normal.      Breath sounds: Normal breath sounds. Abdominal:      General: Abdomen is flat. There is no distension. Palpations: Abdomen is soft. There is no mass. Musculoskeletal:         General: No swelling, tenderness, deformity or signs of injury. Normal range of motion.       Cervical back: Normal range of motion and neck supple. No rigidity or tenderness. Skin:     Coloration: Skin is not jaundiced or pale. Neurological:      General: No focal deficit present. Mental Status: He is alert and oriented to person, place, and time. Cranial Nerves: No cranial nerve deficit. Sensory: No sensory deficit. Psychiatric:         Mood and Affect: Mood normal.        Procedures     Orders Placed This Encounter   Procedures    Influenza A/B, Molecular    COVID-19, Rapid    XR CHEST PORTABLE    CBC with Auto Differential    CMP    Troponin T    Magnesium    EKG 12 Lead        Medications   heparin 25,000 units in dextrose 5% 250 mL (premix) infusion (has no administration in time range)   heparin (porcine) injection 4,000 Units (has no administration in time range)       New Prescriptions    No medications on file        Past Medical History:   Diagnosis Date    Diabetes ([address]     GERD (gastroesophageal reflux disease)     Gout     Hypercholesterolemia     Thyroid disease         Past Surgical History:   Procedure Laterality Date    CARDIAC SURGERY      [facility] UNLISTED PROCEDURE CARDIAC SURGERY  2005    M. I. History reviewed. No pertinent family history. Social History     Socioeconomic History    Marital status:      Spouse name: None    Number of children: None    Years of education: None    Highest education level: None   Tobacco Use    Smoking status: Never    Smokeless tobacco: Never   Substance and Sexual Activity    Alcohol use: Yes    Drug use: No        Allergies: Patient has no known allergies. Previous Medications    ALLOPURINOL (ZYLOPRIM) 100 MG TABLET    Take 1 tablet by mouth in the morning.     CYANOCOBALAMIN 1000 MCG TABLET    Take 1,000 mcg by mouth daily    DAPAGLIFLOZIN (FARXIGA) 10 MG TABLET    Take 10 mg by mouth daily    FENOFIBRATE (TRIGLIDE) 160 MG TABLET    Take 160 mg by mouth daily    INSULIN ASPART (NOVOLOG) 100 UNIT/ML INJECTION VIAL    Inject into the skin as needed    INSULIN DEGLUDEC 100 UNIT/ML SOPN    Inject 70 Units into the skin    LEVOTHYROXINE (SYNTHROID) 175 MCG TABLET    Take 175 mcg by mouth every morning (before breakfast)    LIRAGLUTIDE (VICTOZA) 18 MG/3ML SOPN SC INJECTION    Inject 1.8 mg into the skin    LISINOPRIL (PRINIVIL;ZESTRIL) 20 MG TABLET    Take one tab daily. METFORMIN (GLUCOPHAGE) 1000 MG TABLET    Take 1,000 mg by mouth 2 times daily (with meals)    NITROGLYCERIN (NITROSTAT) 0.4 MG SL TABLET    Use as directed for chest pain    OMEPRAZOLE (PRILOSEC) 20 MG DELAYED RELEASE CAPSULE    Take 20 mg by mouth daily    SEMAGLUTIDE, 1 MG/DOSE, (OZEMPIC, 1 MG/DOSE,) 2 MG/1.5ML SOPN    Inject 1 mg into the skin every 7 days    TRIAMTERENE-HYDROCHLOROTHIAZIDE (MAXZIDE-25) 37.5-25 MG PER TABLET    TAKE ONE TABLET BY MOUTH ONE TIME DAILY        Results for orders placed or performed during the hospital encounter of 03/15/23   Influenza A/B, Molecular    Specimen: Not Specified   Result Value Ref Range    Influenza A, JONY Not detected NOTD      Influenza B, JONY Not detected NOTD     COVID-19, Rapid    Specimen: Nasopharyngeal   Result Value Ref Range    Source Nasopharyngeal      SARS-CoV-2, Rapid Not detected NOTD     XR CHEST PORTABLE    Narrative    History: Chest pain    Exam: portable chest    Comparison: None    Findings: The lungs are clear.  The mediastinal contour and osseous structures  are normal.    IMPRESSIONs: No acute findings       CBC with Auto Differential   Result Value Ref Range    WBC 6.8 4.3 - 11.1 K/uL    RBC 3.72 (L) 4.23 - 5.60 M/uL    Hemoglobin 11.4 (L) 13.6 - 17.2 g/dL    Hematocrit 34.3 (L) 41.1 - 50.3 %    MCV 92.2 82.0 - 102.0 FL    MCH 30.6 26.1 - 32.9 PG    MCHC 33.2 31.4 - 35.0 g/dL    RDW 13.2 11.9 - 14.6 %    Platelets 297 068 - 762 K/uL    MPV 10.5 9.4 - 12.3 FL    nRBC 0.00 0.0 - 0.2 K/uL    Differential Type AUTOMATED      Seg Neutrophils 77 43 - 78 %    Lymphocytes 12 (L) 13 - 44 %    Monocytes 9 4.0 - 12.0 %    Eosinophils % 2 0.5 - 7.8 %    Basophils 0 0.0 - 2.0 %    Immature Granulocytes 0 0.0 - 5.0 %    Segs Absolute 5.2 1.7 - 8.2 K/UL    Absolute Lymph # 0.8 0.5 - 4.6 K/UL    Absolute Mono # 0.6 0.1 - 1.3 K/UL    Absolute Eos # 0.1 0.0 - 0.8 K/UL    Basophils Absolute 0.0 0.0 - 0.2 K/UL    Absolute Immature Granulocyte 0.0 0.0 - 0.5 K/UL   Troponin T   Result Value Ref Range    Troponin T 1419.0 () 0 - 22 ng/L   EKG 12 Lead   Result Value Ref Range    Ventricular Rate 69 BPM    Atrial Rate 93 BPM    P-R Interval 224 ms    QRS Duration 108 ms    Q-T Interval 364 ms    QTc Calculation (Bazett) 390 ms    P Axis 58 degrees    R Axis -3 degrees    T Axis 57 degrees    Diagnosis       Sinus rhythm with mobitz type 1 second degree AV block        XR CHEST PORTABLE   Final Result                        Voice dictation software was used during the making of this note. This software is not perfect and grammatical and other typographical errors may be present. This note has not been completely proofread for errors.      Ryanne Lanier MD  03/15/23 6776

## 2023-03-15 NOTE — PROGRESS NOTES
Skin assessment completed with Rafiq Baumann RN. Overall skin dry and intact with scattered scarring. R radial puncture site s/p lhc. Sacrum and heels intact. Encouraged repositioning.

## 2023-03-15 NOTE — PROGRESS NOTES
Addendum- patient clearly states he does not want CABG, all questions answered, will have cardiology discuss PCI options with him

## 2023-03-15 NOTE — PROGRESS NOTES
[address] cardiology    I discussed with patient his coronary anatomy and the limitations to percutaneous revascularization given his multivessel disease, ostial circumflex stenosis and diffuse LAD stenosis with occluded left dominant circumflex. We discussed the survival advantage of bypass in patients with diabetes and multivessel coronary artery disease. Based on our discussion, he is willing to proceed with coronary bypass grafting. I will discuss this with Dr. Dalton Sanchez. Tentatively scheduled for Friday.     Marilee Landon MD

## 2023-03-15 NOTE — ED NOTES
Report called to lynda mars at cath lab     [employer], [facility]  03/15/23 7284

## 2023-03-15 NOTE — H&P
[facility] Cardiology H and P                Date of  Admission: 3/15/2023 12:24 PM     PCP: Skyler Hong MD  Primary Cardiologist: Dr Noel Hoskins. [address] [facility]  [facility] Attending: Dr Diallo Bose    CC: CP with STEMI activation      Leilani Calvo is a 74 y.o. male with hx of DM, GERD, Gout, HLD, CAD s/p PCI to OM1 OM2 OM3, DM on insulin, obesity, and thyroid disease. The patient started to have sub sternal chest pain over the weekend. This pain did not go away and he went to [facility] ED. He has associated complaints of subjective fevers, SOB, and cough as well. He is noted to have 2nd Degree HB type 1 with some ST segment changes. After confirmation from previous EKG from [facility] a code STEMI was activated. After 4k heparin, 324 mg ASA, and SL nitro the patient was chest pain free prior to arrival.  He has no other complaints at the time of going directly to the CCL for urgent intervention. Review of his labs from [facility] show Elevated HS Trop above 1.4 K, Cr 2.17, and WBC 11.4. Recent Cardiac Synopsis  [facility] hx: PCI to om1 om2 om3  w/ C  without intervention  Echo: Pending  EKnd Degree HB       Past Medical History:   Diagnosis Date    Diabetes ([address]     GERD (gastroesophageal reflux disease)     Gout     Hypercholesterolemia     Thyroid disease       Past Surgical History:   Procedure Laterality Date    CARDIAC SURGERY      [place] UNLISTED PROCEDURE CARDIAC SURGERY      M. I.     No Known Allergies   No family history on file.    Social History       Tobacco History       Smoking Status  Never      Smokeless Tobacco Use  Never              Alcohol History       Alcohol Use Status  Yes              Drug Use       Drug Use Status  No              Sexual Activity       Sexually Active  Not Asked                     Medications Prior to Admission: triamterene-hydroCHLOROthiazide (MAXZIDE-25) 37.5-25 MG per tablet, TAKE ONE TABLET BY MOUTH ONE TIME DAILY  allopurinol (ZYLOPRIM) 100 MG tablet, mg/dL    BUN 35 (H) 7.0 - 18.0 MG/DL    Creatinine 2.17 (H) 0.8 - 1.5 MG/DL    Est, Glom Filt Rate 33 (L) >60 ml/min/1.73m2    Calcium 9.9 8.3 - 10.4 MG/DL    Total Bilirubin 0.4 0.2 - 1.1 MG/DL    ALT 61 13.0 - 61.0 U/L    AST 84 (H) 15 - 37 U/L    Alk Phosphatase 81 45.0 - 117.0 U/L    Total Protein 8.3 (H) 6.4 - 8.2 g/dL    Albumin 4.4 3.2 - 4.6 g/dL    Globulin 3.9 2.8 - 4.5 g/dL    Albumin/Globulin Ratio 1.1 0.4 - 1.6     Influenza A/B, Molecular    Collection Time: 03/15/23 10:57 AM    Specimen: Not Specified   Result Value Ref Range    Influenza A, JONY Not detected NOTD      Influenza B, JONY Not detected NOTD     Troponin T    Collection Time: 03/15/23 10:57 AM   Result Value Ref Range    Troponin T 1419.0 (HH) 0 - 22 ng/L   Magnesium    Collection Time: 03/15/23 10:57 AM   Result Value Ref Range    Magnesium 2.3 1.2 - 2.6 mg/dL   COVID-19, Rapid    Collection Time: 03/15/23 10:57 AM    Specimen: Nasopharyngeal   Result Value Ref Range    Source Nasopharyngeal      SARS-CoV-2, Rapid Not detected NOTD     EKG 12 Lead    Collection Time: 03/15/23 10:59 AM   Result Value Ref Range    Ventricular Rate 69 BPM    Atrial Rate 93 BPM    P-R Interval 224 ms    QRS Duration 108 ms    Q-T Interval 364 ms    QTc Calculation (Bazett) 390 ms    P Axis 58 degrees    R Axis -3 degrees    T Axis 57 degrees    Diagnosis       Sinus rhythm with mobitz type 1 second degree AV block        XR CHEST PORTABLE    Result Date: 3/15/2023  History: Chest pain Exam: portable chest Comparison: None Findings: The lungs are clear. The mediastinal contour and osseous structures are normal. IMPRESSIONs: No acute findings           Initial Recommendations:      Cardiac Problems:    NSTEMI:  likely infarct started over the weekend with Q waves seen by EKG. Pt is now CP free.  Take for emergent LHC, check echo, daily labs, trend trop x2, Cardiac/DM diet, diabetic education, dietition consult, Heparin Drip, NO BB with HB, check lipid panel,check

## 2023-03-15 NOTE — CONSULTS
Raymond Castillo. MD Fabian Lyonsl Tone. Kristan Pierre MD           CONSULT NOTE    Geoffrey Lara         3/15/2023        1955    REFERRING PHYSICIAN:  Dr. Lui Auguste:  The patient is a 74 y.o. male who was admitted for STEMI (ST elevation myocardial infarction) ([facility] [address] [I21.3]. He presented to the ER at [facility] with chest pain. His chest pain initially started on Saturday evening. He felt poorly and laid down due to symptoms. He also noted a low grade temp, cough and headache. He continued to feel poorly on Saturday. On Sunday evening, he had recurrent chest pain, low grade temp and general malaise. On Monday, he noted he had to stop and rest after mild exertion. He continued to have intermittent chest pain. He called [facility] today regarding symptoms and was offered an appointment but also referred to the ER as well. He presented to the ER in [facility]. Troponin was elevated. EKG was abnormal with no prior EKG available for review. STEMI was called and he was transported to [facility] for emergent cardiac catheterization. LHC showed severe multivessel disease. Echocardiogram is pending. Creatinine elevated as well. He denies any prior history of renal disease and believes he may just be dehydrated. He has been diabetic since 2005. He had an MI/PCI in 2005. He has FH of CAD. Risk factors include DM, HTN, dyslipidemia    No history of stroke, TIA, prior cardiac surgery, prior vascular surgery, anesthetic complication, lung disease, DVT or PE, GI bleeding       Past Medical History:   Diagnosis Date    Diabetes ([facility] [address]     GERD (gastroesophageal reflux disease)     Gout     Hypercholesterolemia     Thyroid disease        Past Surgical History:   Procedure Laterality Date    CARDIAC SURGERY      [place] UNLISTED PROCEDURE CARDIAC SURGERY  2005    M. I. No family history on file.     Social History     Socioeconomic History    Marital status:      Spouse name: Not on file    Number of children: Not on file    Years of education: Not on file    Highest education level: Not on file   Occupational History    Not on file   Tobacco Use    Smoking status: Never    Smokeless tobacco: Never   Substance and Sexual Activity    Alcohol use: Yes    Drug use: No    Sexual activity: Not on file   Other Topics Concern    Not on file   Social History Narrative    Not on file     Social Determinants of Health     Financial Resource Strain: Not on file   Food Insecurity: Not on file   Transportation Needs: Not on file   Physical Activity: Not on file   Stress: Not on file   Social Connections: Not on file   Intimate Partner Violence: Not on file   Housing Stability: Not on file       No Known Allergies    No current facility-administered medications on file prior to encounter. Current Outpatient Medications on File Prior to Encounter   Medication Sig Dispense Refill    triamterene-hydroCHLOROthiazide (MAXZIDE-25) 37.5-25 MG per tablet TAKE ONE TABLET BY MOUTH ONE TIME DAILY 90 tablet 0    allopurinol (ZYLOPRIM) 100 MG tablet Take 1 tablet by mouth in the morning. 90 tablet 3    cyanocobalamin 1000 MCG tablet Take 1,000 mcg by mouth daily      dapagliflozin (FARXIGA) 10 MG tablet Take 10 mg by mouth daily      fenofibrate (TRIGLIDE) 160 MG tablet Take 160 mg by mouth daily      insulin aspart (NOVOLOG) 100 UNIT/ML injection vial Inject into the skin as needed      Insulin Degludec 100 UNIT/ML SOPN Inject 70 Units into the skin      levothyroxine (SYNTHROID) 175 MCG tablet Take 175 mcg by mouth every morning (before breakfast)      Liraglutide (VICTOZA) 18 MG/3ML SOPN SC injection Inject 1.8 mg into the skin      lisinopril (PRINIVIL;ZESTRIL) 20 MG tablet Take one tab daily.       metFORMIN (GLUCOPHAGE) 1000 MG tablet Take 1,000 mg by mouth 2 times daily (with meals)      nitroGLYCERIN (NITROSTAT) 0.4 MG SL tablet Use as directed for chest pain      omeprazole (PRILOSEC) 20 MG delayed release capsule Take 20 mg

## 2023-03-15 NOTE — PROGRESS NOTES
TRANSFER - IN REPORT:    Verbal report received from [facility] on [place]annie [place] being received from cath lab for routine progression of patient care      Report consisted of patients Situation, Background, Assessment and Recommendations(SBAR). Information from the following report(s) SBAR was reviewed with the receiving nurse. Opportunity for questions and clarification was provided. Assessment completed upon patients arrival to unit and care assumed.

## 2023-03-15 NOTE — PROGRESS NOTES
TRANSFER - OUT REPORT:    Verbal report given to RN on Demetrius Perez  being transferred to [facility] for routine progression of patient care       Report consisted of patient's Situation, Background, Assessment and   Recommendations(SBAR). Information from the following report(s) Nurse Handoff Report was reviewed with the receiving nurse.     STEMI w/ Dr. Reyna Sy  CV Surgery consult  R radial  TR band 12 mls  4 mg versed  50 mcg fentanyl

## 2023-03-15 NOTE — PROGRESS NOTES
Radial compression band removed at 1544 after slowly reducing air from 14 cc to zero as per hospital protocol. No bleeding or hematoma noted. 2 x 2 gauze with tegaderm placed over puncture site. The affected extremity is warm and dry to the touch. Frequent vital signs printed and placed on bedside chart. Patient instructed to call if any bleeding noted on gauze. Patient verbalized understanding the nursing instructions.

## 2023-03-15 NOTE — DIABETES MGMT
Patient admitted with MI, planned for possible CABG on Friday. Admitting blood glucose 204. Noted patient A1c ordered for tomorrow. Creatinine 2.17. GFR 33. Reviewed patient current regimen: Humalog correctional insulin. Per chart review patient has diet with 4 carb choices ordered and is to be NPO after midnight. Patient would likely benefit from low dose basal insulin while NPO to help improve glycemic control. Provider updated via [employer] regarding recommendations and patient glycemic control. Per provider patient not to be NPO anymore until CABG. Requested POC glucose as patient may need higher insulin dosing pending appetite. Update at 1512: most recent FSBS 126. Pending A1c, patient appetite, and AM glycemic control provider could consider starting Lantus tomorrow. Provider updated via [employer].

## 2023-03-15 NOTE — Clinical Note
----- Message from Leo Wiley sent at 8/5/2020 11:36 AM EDT -----  Subject: Message to Provider    QUESTIONS  Information for Provider? patient wants a refill on hydrocodone 5-325 mg  ---------------------------------------------------------------------------  --------------  CALL BACK INFO  What is the best way for the office to contact you? OK to leave message on   voicemail  Preferred Call Back Phone Number? [phone]  ---------------------------------------------------------------------------  --------------  SCRIPT ANSWERS  Relationship to Patient?  Self Sheath was inserted in the right radial artery.

## 2023-03-16 ENCOUNTER — APPOINTMENT (OUTPATIENT)
Dept: GENERAL RADIOLOGY | Age: 68
DRG: 215 | End: 2023-03-16
Payer: COMMERCIAL

## 2023-03-16 ENCOUNTER — APPOINTMENT (OUTPATIENT)
Dept: ULTRASOUND IMAGING | Age: 68
DRG: 215 | End: 2023-03-16
Payer: COMMERCIAL

## 2023-03-16 ENCOUNTER — PREP FOR PROCEDURE (OUTPATIENT)
Dept: CARDIOTHORACIC SURGERY | Age: 68
End: 2023-03-16

## 2023-03-16 PROBLEM — Z95.5 HISTORY OF CORONARY ARTERY STENT PLACEMENT: Status: ACTIVE | Noted: 2023-03-16

## 2023-03-16 PROBLEM — J96.01 ACUTE RESPIRATORY FAILURE WITH HYPOXIA (HCC): Status: ACTIVE | Noted: 2023-03-16

## 2023-03-16 PROBLEM — I49.9 VENTRICULAR ARRHYTHMIA: Status: ACTIVE | Noted: 2023-03-16

## 2023-03-16 PROBLEM — I46.9 CARDIAC ARREST (HCC): Status: ACTIVE | Noted: 2023-03-16

## 2023-03-16 LAB
ALBUMIN SERPL-MCNC: 2.6 G/DL (ref 3.2–4.6)
ALBUMIN SERPL-MCNC: 3.4 G/DL (ref 3.2–4.6)
ALBUMIN/GLOB SERPL: 0.7 (ref 0.4–1.6)
ALBUMIN/GLOB SERPL: 0.9 (ref 0.4–1.6)
ALP SERPL-CCNC: 74 U/L (ref 50–136)
ALP SERPL-CCNC: 90 U/L (ref 50–136)
ALT SERPL-CCNC: 49 U/L (ref 12–65)
ALT SERPL-CCNC: 651 U/L (ref 12–65)
ANION GAP SERPL CALC-SCNC: 10 MMOL/L (ref 2–11)
ANION GAP SERPL CALC-SCNC: 10 MMOL/L (ref 2–11)
ANION GAP SERPL CALC-SCNC: 7 MMOL/L (ref 2–11)
ARTERIAL PATENCY WRIST A: POSITIVE
AST SERPL-CCNC: 1197 U/L (ref 15–37)
AST SERPL-CCNC: 55 U/L (ref 15–37)
BASE DEFICIT BLD-SCNC: 5.3 MMOL/L
BASOPHILS # BLD: 0 K/UL (ref 0–0.2)
BASOPHILS # BLD: 0 K/UL (ref 0–0.2)
BASOPHILS NFR BLD: 0 % (ref 0–2)
BASOPHILS NFR BLD: 0 % (ref 0–2)
BDY SITE: ABNORMAL
BILIRUB SERPL-MCNC: 0.6 MG/DL (ref 0.2–1.1)
BILIRUB SERPL-MCNC: 0.8 MG/DL (ref 0.2–1.1)
BUN SERPL-MCNC: 25 MG/DL (ref 8–23)
BUN SERPL-MCNC: 26 MG/DL (ref 8–23)
BUN SERPL-MCNC: 31 MG/DL (ref 8–23)
CA-I BLD-MCNC: 1.14 MMOL/L (ref 1.12–1.32)
CALCIUM SERPL-MCNC: 8.4 MG/DL (ref 8.3–10.4)
CALCIUM SERPL-MCNC: 8.8 MG/DL (ref 8.3–10.4)
CALCIUM SERPL-MCNC: 9.2 MG/DL (ref 8.3–10.4)
CHLORIDE SERPL-SCNC: 106 MMOL/L (ref 101–110)
CHLORIDE SERPL-SCNC: 106 MMOL/L (ref 101–110)
CHLORIDE SERPL-SCNC: 107 MMOL/L (ref 101–110)
CHOLEST SERPL-MCNC: 140 MG/DL
CO2 BLD-SCNC: 18 MMOL/L (ref 13–23)
CO2 SERPL-SCNC: 21 MMOL/L (ref 21–32)
CREAT SERPL-MCNC: 1.8 MG/DL (ref 0.8–1.5)
CREAT SERPL-MCNC: 1.9 MG/DL (ref 0.8–1.5)
CREAT SERPL-MCNC: 2.2 MG/DL (ref 0.8–1.5)
DIFFERENTIAL METHOD BLD: ABNORMAL
DIFFERENTIAL METHOD BLD: ABNORMAL
EKG ATRIAL RATE: 138 BPM
EKG ATRIAL RATE: 90 BPM
EKG DIAGNOSIS: NORMAL
EKG DIAGNOSIS: NORMAL
EKG P AXIS: 62 DEGREES
EKG Q-T INTERVAL: 306 MS
EKG Q-T INTERVAL: 470 MS
EKG QRS DURATION: 110 MS
EKG QRS DURATION: 94 MS
EKG QTC CALCULATION (BAZETT): 473 MS
EKG QTC CALCULATION (BAZETT): 475 MS
EKG R AXIS: -16 DEGREES
EKG R AXIS: 42 DEGREES
EKG T AXIS: 50 DEGREES
EKG T AXIS: 52 DEGREES
EKG VENTRICULAR RATE: 145 BPM
EKG VENTRICULAR RATE: 61 BPM
EOSINOPHIL # BLD: 0 K/UL (ref 0–0.8)
EOSINOPHIL # BLD: 0.1 K/UL (ref 0–0.8)
EOSINOPHIL NFR BLD: 0 % (ref 0.5–7.8)
EOSINOPHIL NFR BLD: 1 % (ref 0.5–7.8)
ERYTHROCYTE [DISTWIDTH] IN BLOOD BY AUTOMATED COUNT: 13.6 % (ref 11.9–14.6)
ERYTHROCYTE [DISTWIDTH] IN BLOOD BY AUTOMATED COUNT: 13.7 % (ref 11.9–14.6)
ERYTHROCYTE [DISTWIDTH] IN BLOOD BY AUTOMATED COUNT: 13.8 % (ref 11.9–14.6)
EST. AVERAGE GLUCOSE BLD GHB EST-MCNC: 206 MG/DL
FIO2 ON VENT: 100 %
GAS FLOW.O2 O2 DELIVERY SYS: ABNORMAL
GLOBULIN SER CALC-MCNC: 3.7 G/DL (ref 2.8–4.5)
GLOBULIN SER CALC-MCNC: 3.8 G/DL (ref 2.8–4.5)
GLUCOSE BLD STRIP.AUTO-MCNC: 166 MG/DL (ref 65–100)
GLUCOSE BLD STRIP.AUTO-MCNC: 280 MG/DL (ref 65–100)
GLUCOSE BLD STRIP.AUTO-MCNC: 299 MG/DL (ref 65–100)
GLUCOSE BLD STRIP.AUTO-MCNC: 350 MG/DL (ref 65–100)
GLUCOSE BLD STRIP.AUTO-MCNC: 369 MG/DL (ref 65–100)
GLUCOSE SERPL-MCNC: 214 MG/DL (ref 65–100)
GLUCOSE SERPL-MCNC: 325 MG/DL (ref 65–100)
GLUCOSE SERPL-MCNC: 377 MG/DL (ref 65–100)
HBA1C MFR BLD: 8.8 % (ref 4.8–5.6)
HCO3 BLD-SCNC: 19.2 MMOL/L (ref 22–26)
HCT VFR BLD AUTO: 30.2 % (ref 41.1–50.3)
HCT VFR BLD AUTO: 35.1 % (ref 41.1–50.3)
HCT VFR BLD AUTO: 35.4 % (ref 41.1–50.3)
HDLC SERPL-MCNC: 35 MG/DL (ref 40–60)
HDLC SERPL: 4
HGB BLD-MCNC: 11.3 G/DL (ref 13.6–17.2)
HGB BLD-MCNC: 11.3 G/DL (ref 13.6–17.2)
HGB BLD-MCNC: 9.8 G/DL (ref 13.6–17.2)
HISTORY CHECK: NORMAL
IMM GRANULOCYTES # BLD AUTO: 0 K/UL (ref 0–0.5)
IMM GRANULOCYTES # BLD AUTO: 0.1 K/UL (ref 0–0.5)
IMM GRANULOCYTES NFR BLD AUTO: 1 % (ref 0–5)
IMM GRANULOCYTES NFR BLD AUTO: 1 % (ref 0–5)
INR PPP: 1
INSPIRATION.DURATION SETTING TIME VENT: 0.9 SEC
IPAP/PIP: 28
LACTATE SERPL-SCNC: 1.7 MMOL/L (ref 0.4–2)
LDLC SERPL CALC-MCNC: 66.8 MG/DL
LYMPHOCYTES # BLD: 0.4 K/UL (ref 0.5–4.6)
LYMPHOCYTES # BLD: 0.9 K/UL (ref 0.5–4.6)
LYMPHOCYTES NFR BLD: 14 % (ref 13–44)
LYMPHOCYTES NFR BLD: 5 % (ref 13–44)
MAGNESIUM SERPL-MCNC: 2.2 MG/DL (ref 1.8–2.4)
MAGNESIUM SERPL-MCNC: 3.1 MG/DL (ref 1.8–2.4)
MCH RBC QN AUTO: 29.8 PG (ref 26.1–32.9)
MCH RBC QN AUTO: 30.2 PG (ref 26.1–32.9)
MCH RBC QN AUTO: 30.3 PG (ref 26.1–32.9)
MCHC RBC AUTO-ENTMCNC: 31.9 G/DL (ref 31.4–35)
MCHC RBC AUTO-ENTMCNC: 32.2 G/DL (ref 31.4–35)
MCHC RBC AUTO-ENTMCNC: 32.5 G/DL (ref 31.4–35)
MCV RBC AUTO: 92.6 FL (ref 82–102)
MCV RBC AUTO: 92.9 FL (ref 82–102)
MCV RBC AUTO: 94.9 FL (ref 82–102)
MONOCYTES # BLD: 0.6 K/UL (ref 0.1–1.3)
MONOCYTES # BLD: 0.9 K/UL (ref 0.1–1.3)
MONOCYTES NFR BLD: 12 % (ref 4–12)
MONOCYTES NFR BLD: 9 % (ref 4–12)
NEUTS SEG # BLD: 4.7 K/UL (ref 1.7–8.2)
NEUTS SEG # BLD: 6.6 K/UL (ref 1.7–8.2)
NEUTS SEG NFR BLD: 75 % (ref 43–78)
NEUTS SEG NFR BLD: 82 % (ref 43–78)
NRBC # BLD: 0 K/UL (ref 0–0.2)
PCO2 BLD: 33.2 MMHG (ref 35–45)
PEEP RESPIRATORY: 14
PH BLD: 7.37 (ref 7.35–7.45)
PHOSPHATE SERPL-MCNC: 3.9 MG/DL (ref 2.3–3.7)
PLATELET # BLD AUTO: 239 K/UL (ref 150–450)
PLATELET # BLD AUTO: 242 K/UL (ref 150–450)
PLATELET # BLD AUTO: 253 K/UL (ref 150–450)
PMV BLD AUTO: 10.9 FL (ref 9.4–12.3)
PMV BLD AUTO: 11 FL (ref 9.4–12.3)
PMV BLD AUTO: 11.3 FL (ref 9.4–12.3)
PO2 BLD: 186 MMHG (ref 75–100)
POTASSIUM BLD-SCNC: 5.1 MMOL/L (ref 3.5–5.1)
POTASSIUM SERPL-SCNC: 4 MMOL/L (ref 3.5–5.1)
POTASSIUM SERPL-SCNC: 4.6 MMOL/L (ref 3.5–5.1)
POTASSIUM SERPL-SCNC: 5.1 MMOL/L (ref 3.5–5.1)
PROT SERPL-MCNC: 6.4 G/DL (ref 6.3–8.2)
PROT SERPL-MCNC: 7.1 G/DL (ref 6.3–8.2)
PROTHROMBIN TIME: 13.6 SEC (ref 12.6–14.3)
RBC # BLD AUTO: 3.25 M/UL (ref 4.23–5.6)
RBC # BLD AUTO: 3.73 M/UL (ref 4.23–5.6)
RBC # BLD AUTO: 3.79 M/UL (ref 4.23–5.6)
RESPIRATORY RATE, POC: 20 (ref 5–40)
RESPIRATORY RATE: 18
SAO2 % BLD: 100 %
SERVICE CMNT-IMP: ABNORMAL
SODIUM BLD-SCNC: 133 MMOL/L (ref 136–145)
SODIUM SERPL-SCNC: 134 MMOL/L (ref 133–143)
SODIUM SERPL-SCNC: 137 MMOL/L (ref 133–143)
SODIUM SERPL-SCNC: 138 MMOL/L (ref 133–143)
SPECIMEN SITE: ABNORMAL
TRIGL SERPL-MCNC: 191 MG/DL (ref 35–150)
UFH PPP CHRO-ACNC: 0.15 IU/ML (ref 0.3–0.7)
UFH PPP CHRO-ACNC: <0.1 IU/ML (ref 0.3–0.7)
UFH PPP CHRO-ACNC: <0.1 IU/ML (ref 0.3–0.7)
VENTILATION MODE VENT: ABNORMAL
VLDLC SERPL CALC-MCNC: 38.2 MG/DL (ref 6–23)
VT SETTING VENT: 550
WBC # BLD AUTO: 6.2 K/UL (ref 4.3–11.1)
WBC # BLD AUTO: 6.3 K/UL (ref 4.3–11.1)
WBC # BLD AUTO: 7.9 K/UL (ref 4.3–11.1)

## 2023-03-16 PROCEDURE — 85520 HEPARIN ASSAY: CPT

## 2023-03-16 PROCEDURE — 80053 COMPREHEN METABOLIC PANEL: CPT

## 2023-03-16 PROCEDURE — 99291 CRITICAL CARE FIRST HOUR: CPT | Performed by: INTERNAL MEDICINE

## 2023-03-16 PROCEDURE — 6360000002 HC RX W HCPCS: Performed by: INTERNAL MEDICINE

## 2023-03-16 PROCEDURE — 6360000002 HC RX W HCPCS

## 2023-03-16 PROCEDURE — 2580000003 HC RX 258: Performed by: NURSE PRACTITIONER

## 2023-03-16 PROCEDURE — 85027 COMPLETE CBC AUTOMATED: CPT

## 2023-03-16 PROCEDURE — 6370000000 HC RX 637 (ALT 250 FOR IP): Performed by: INTERNAL MEDICINE

## 2023-03-16 PROCEDURE — 85610 PROTHROMBIN TIME: CPT

## 2023-03-16 PROCEDURE — 87641 MR-STAPH DNA AMP PROBE: CPT

## 2023-03-16 PROCEDURE — 6370000000 HC RX 637 (ALT 250 FOR IP): Performed by: NURSE PRACTITIONER

## 2023-03-16 PROCEDURE — 84295 ASSAY OF SERUM SODIUM: CPT

## 2023-03-16 PROCEDURE — 36415 COLL VENOUS BLD VENIPUNCTURE: CPT

## 2023-03-16 PROCEDURE — 6360000002 HC RX W HCPCS: Performed by: EMERGENCY MEDICINE

## 2023-03-16 PROCEDURE — 93970 EXTREMITY STUDY: CPT

## 2023-03-16 PROCEDURE — 74018 RADEX ABDOMEN 1 VIEW: CPT

## 2023-03-16 PROCEDURE — 71045 X-RAY EXAM CHEST 1 VIEW: CPT

## 2023-03-16 PROCEDURE — C9113 INJ PANTOPRAZOLE SODIUM, VIA: HCPCS | Performed by: INTERNAL MEDICINE

## 2023-03-16 PROCEDURE — 83036 HEMOGLOBIN GLYCOSYLATED A1C: CPT

## 2023-03-16 PROCEDURE — 6370000000 HC RX 637 (ALT 250 FOR IP): Performed by: EMERGENCY MEDICINE

## 2023-03-16 PROCEDURE — 80061 LIPID PANEL: CPT

## 2023-03-16 PROCEDURE — 51702 INSERT TEMP BLADDER CATH: CPT

## 2023-03-16 PROCEDURE — 0BH18EZ INSERTION OF ENDOTRACHEAL AIRWAY INTO TRACHEA, VIA NATURAL OR ARTIFICIAL OPENING ENDOSCOPIC: ICD-10-PCS | Performed by: THORACIC SURGERY (CARDIOTHORACIC VASCULAR SURGERY)

## 2023-03-16 PROCEDURE — 2580000003 HC RX 258: Performed by: INTERNAL MEDICINE

## 2023-03-16 PROCEDURE — 5A1955Z RESPIRATORY VENTILATION, GREATER THAN 96 CONSECUTIVE HOURS: ICD-10-PCS | Performed by: THORACIC SURGERY (CARDIOTHORACIC VASCULAR SURGERY)

## 2023-03-16 PROCEDURE — 86900 BLOOD TYPING SEROLOGIC ABO: CPT

## 2023-03-16 PROCEDURE — 2500000003 HC RX 250 WO HCPCS

## 2023-03-16 PROCEDURE — 83605 ASSAY OF LACTIC ACID: CPT

## 2023-03-16 PROCEDURE — 2100000000 HC CCU R&B

## 2023-03-16 PROCEDURE — 6360000002 HC RX W HCPCS: Performed by: NURSE PRACTITIONER

## 2023-03-16 PROCEDURE — 92950 HEART/LUNG RESUSCITATION CPR: CPT

## 2023-03-16 PROCEDURE — 2580000003 HC RX 258: Performed by: EMERGENCY MEDICINE

## 2023-03-16 PROCEDURE — 83735 ASSAY OF MAGNESIUM: CPT

## 2023-03-16 PROCEDURE — 93005 ELECTROCARDIOGRAM TRACING: CPT | Performed by: EMERGENCY MEDICINE

## 2023-03-16 PROCEDURE — 84100 ASSAY OF PHOSPHORUS: CPT

## 2023-03-16 PROCEDURE — 93005 ELECTROCARDIOGRAM TRACING: CPT | Performed by: INTERNAL MEDICINE

## 2023-03-16 PROCEDURE — 82803 BLOOD GASES ANY COMBINATION: CPT

## 2023-03-16 PROCEDURE — 93880 EXTRACRANIAL BILAT STUDY: CPT

## 2023-03-16 PROCEDURE — 82947 ASSAY GLUCOSE BLOOD QUANT: CPT

## 2023-03-16 PROCEDURE — 86923 COMPATIBILITY TEST ELECTRIC: CPT

## 2023-03-16 PROCEDURE — 94002 VENT MGMT INPAT INIT DAY: CPT

## 2023-03-16 PROCEDURE — 36556 INSERT NON-TUNNEL CV CATH: CPT

## 2023-03-16 PROCEDURE — 99232 SBSQ HOSP IP/OBS MODERATE 35: CPT | Performed by: INTERNAL MEDICINE

## 2023-03-16 PROCEDURE — 2500000003 HC RX 250 WO HCPCS: Performed by: INTERNAL MEDICINE

## 2023-03-16 PROCEDURE — A4216 STERILE WATER/SALINE, 10 ML: HCPCS | Performed by: INTERNAL MEDICINE

## 2023-03-16 PROCEDURE — 85025 COMPLETE CBC W/AUTO DIFF WBC: CPT

## 2023-03-16 RX ORDER — LORAZEPAM 2 MG/ML
2 INJECTION INTRAMUSCULAR ONCE
Status: COMPLETED | OUTPATIENT
Start: 2023-03-16 | End: 2023-03-16

## 2023-03-16 RX ORDER — 0.9 % SODIUM CHLORIDE 0.9 %
1000 INTRAVENOUS SOLUTION INTRAVENOUS ONCE
Status: COMPLETED | OUTPATIENT
Start: 2023-03-16 | End: 2023-03-16

## 2023-03-16 RX ORDER — AMIODARONE HYDROCHLORIDE 50 MG/ML
INJECTION, SOLUTION INTRAVENOUS
Status: COMPLETED | OUTPATIENT
Start: 2023-03-16 | End: 2023-03-16

## 2023-03-16 RX ORDER — FENTANYL CITRATE-0.9 % NACL/PF 10 MCG/ML
25-200 PLASTIC BAG, INJECTION (ML) INTRAVENOUS CONTINUOUS
Status: DISCONTINUED | OUTPATIENT
Start: 2023-03-16 | End: 2023-03-17 | Stop reason: SDUPTHER

## 2023-03-16 RX ORDER — INSULIN LISPRO 100 [IU]/ML
2 INJECTION, SOLUTION INTRAVENOUS; SUBCUTANEOUS
Status: DISCONTINUED | OUTPATIENT
Start: 2023-03-16 | End: 2023-03-20

## 2023-03-16 RX ORDER — FENTANYL CITRATE 50 UG/ML
INJECTION, SOLUTION INTRAMUSCULAR; INTRAVENOUS
Status: DISPENSED
Start: 2023-03-16 | End: 2023-03-17

## 2023-03-16 RX ORDER — METOPROLOL TARTRATE 5 MG/5ML
INJECTION INTRAVENOUS
Status: COMPLETED
Start: 2023-03-16 | End: 2023-03-16

## 2023-03-16 RX ORDER — NOREPINEPHRINE BIT/0.9 % NACL 16MG/250ML
INFUSION BOTTLE (ML) INTRAVENOUS
Status: DISPENSED
Start: 2023-03-16 | End: 2023-03-17

## 2023-03-16 RX ORDER — INSULIN GLARGINE 100 [IU]/ML
5 INJECTION, SOLUTION SUBCUTANEOUS DAILY
Status: DISCONTINUED | OUTPATIENT
Start: 2023-03-16 | End: 2023-03-23

## 2023-03-16 RX ORDER — AMIODARONE HYDROCHLORIDE 200 MG/1
600 TABLET ORAL EVERY 12 HOURS
Status: DISCONTINUED | OUTPATIENT
Start: 2023-03-16 | End: 2023-03-16

## 2023-03-16 RX ORDER — METOPROLOL TARTRATE 5 MG/5ML
5 INJECTION INTRAVENOUS EVERY 6 HOURS
Status: DISCONTINUED | OUTPATIENT
Start: 2023-03-16 | End: 2023-03-19 | Stop reason: DRUGHIGH

## 2023-03-16 RX ORDER — FENTANYL CITRATE 50 UG/ML
50 INJECTION, SOLUTION INTRAMUSCULAR; INTRAVENOUS
Status: DISCONTINUED | OUTPATIENT
Start: 2023-03-16 | End: 2023-03-24

## 2023-03-16 RX ORDER — PROPOFOL 10 MG/ML
INJECTION, EMULSION INTRAVENOUS
Status: COMPLETED
Start: 2023-03-16 | End: 2023-03-16

## 2023-03-16 RX ORDER — DOBUTAMINE HYDROCHLORIDE 200 MG/100ML
2.5-1 INJECTION INTRAVENOUS CONTINUOUS
Status: DISCONTINUED | OUTPATIENT
Start: 2023-03-16 | End: 2023-03-17 | Stop reason: SDUPTHER

## 2023-03-16 RX ORDER — EPINEPHRINE 0.1 MG/ML
SYRINGE (ML) INJECTION
Status: COMPLETED | OUTPATIENT
Start: 2023-03-16 | End: 2023-03-16

## 2023-03-16 RX ORDER — MIDAZOLAM HYDROCHLORIDE 1 MG/ML
INJECTION INTRAMUSCULAR; INTRAVENOUS
Status: DISPENSED
Start: 2023-03-16 | End: 2023-03-17

## 2023-03-16 RX ORDER — INSULIN LISPRO 100 [IU]/ML
2 INJECTION, SOLUTION INTRAVENOUS; SUBCUTANEOUS ONCE
Status: COMPLETED | OUTPATIENT
Start: 2023-03-16 | End: 2023-03-16

## 2023-03-16 RX ORDER — MIDAZOLAM HYDROCHLORIDE 1 MG/ML
1-10 INJECTION, SOLUTION INTRAVENOUS CONTINUOUS
Status: DISCONTINUED | OUTPATIENT
Start: 2023-03-16 | End: 2023-03-17 | Stop reason: SDUPTHER

## 2023-03-16 RX ORDER — DOBUTAMINE HYDROCHLORIDE 200 MG/100ML
INJECTION INTRAVENOUS
Status: COMPLETED
Start: 2023-03-16 | End: 2023-03-16

## 2023-03-16 RX ORDER — MIDAZOLAM HYDROCHLORIDE 1 MG/ML
2 INJECTION INTRAMUSCULAR; INTRAVENOUS
Status: DISCONTINUED | OUTPATIENT
Start: 2023-03-16 | End: 2023-03-24

## 2023-03-16 RX ORDER — MIDAZOLAM HYDROCHLORIDE 1 MG/ML
4 INJECTION, SOLUTION INTRAMUSCULAR; INTRAVENOUS ONCE
Status: COMPLETED | OUTPATIENT
Start: 2023-03-16 | End: 2023-03-16

## 2023-03-16 RX ORDER — FENTANYL CITRATE 50 UG/ML
50 INJECTION, SOLUTION INTRAMUSCULAR; INTRAVENOUS ONCE
Status: COMPLETED | OUTPATIENT
Start: 2023-03-16 | End: 2023-03-16

## 2023-03-16 RX ORDER — PROPOFOL 10 MG/ML
5-50 INJECTION, EMULSION INTRAVENOUS CONTINUOUS
Status: DISCONTINUED | OUTPATIENT
Start: 2023-03-16 | End: 2023-03-24

## 2023-03-16 RX ORDER — NOREPINEPHRINE BIT/0.9 % NACL 16MG/250ML
1-30 INFUSION BOTTLE (ML) INTRAVENOUS CONTINUOUS
Status: DISCONTINUED | OUTPATIENT
Start: 2023-03-16 | End: 2023-03-22

## 2023-03-16 RX ADMIN — INSULIN LISPRO 4 UNITS: 100 INJECTION, SOLUTION INTRAVENOUS; SUBCUTANEOUS at 12:56

## 2023-03-16 RX ADMIN — DOBUTAMINE HYDROCHLORIDE 2.5 MCG/KG/MIN: 200 INJECTION INTRAVENOUS at 21:52

## 2023-03-16 RX ADMIN — METOPROLOL TARTRATE 5 MG: 5 INJECTION, SOLUTION INTRAVENOUS at 17:29

## 2023-03-16 RX ADMIN — SODIUM CHLORIDE, PRESERVATIVE FREE 10 ML: 5 INJECTION INTRAVENOUS at 09:28

## 2023-03-16 RX ADMIN — NOREPINEPHRINE BITARTRATE 30 MCG/MIN: 1 SOLUTION INTRAVENOUS at 22:02

## 2023-03-16 RX ADMIN — MAGNESIUM SULFATE HEPTAHYDRATE 2000 MG: 40 INJECTION, SOLUTION INTRAVENOUS at 17:36

## 2023-03-16 RX ADMIN — FENTANYL CITRATE 50 MCG/HR: 50 INJECTION, SOLUTION INTRAMUSCULAR; INTRAVENOUS at 17:54

## 2023-03-16 RX ADMIN — AMIODARONE HYDROCHLORIDE 1 MG/MIN: 50 INJECTION, SOLUTION INTRAVENOUS at 17:29

## 2023-03-16 RX ADMIN — HEPARIN SODIUM 2000 UNITS: 1000 INJECTION INTRAVENOUS; SUBCUTANEOUS at 12:57

## 2023-03-16 RX ADMIN — PANTOPRAZOLE SODIUM 40 MG: 40 INJECTION, POWDER, LYOPHILIZED, FOR SOLUTION INTRAVENOUS at 22:57

## 2023-03-16 RX ADMIN — MIDAZOLAM 2 MG/HR: 5 INJECTION INTRAMUSCULAR; INTRAVENOUS at 23:10

## 2023-03-16 RX ADMIN — SODIUM CHLORIDE 1000 ML: 9 INJECTION, SOLUTION INTRAVENOUS at 22:03

## 2023-03-16 RX ADMIN — ASPIRIN 81 MG: 81 TABLET, CHEWABLE ORAL at 09:28

## 2023-03-16 RX ADMIN — MIDAZOLAM HYDROCHLORIDE 2 MG: 1 INJECTION, SOLUTION INTRAMUSCULAR; INTRAVENOUS at 22:09

## 2023-03-16 RX ADMIN — METOPROLOL TARTRATE 5 MG: 5 INJECTION INTRAVENOUS at 17:29

## 2023-03-16 RX ADMIN — FENOFIBRATE 160 MG: 160 TABLET, FILM COATED ORAL at 09:27

## 2023-03-16 RX ADMIN — PROPOFOL 15 MCG/KG/MIN: 10 INJECTION, EMULSION INTRAVENOUS at 17:55

## 2023-03-16 RX ADMIN — NOREPINEPHRINE BITARTRATE 5 MCG/MIN: 1 SOLUTION INTRAVENOUS at 18:08

## 2023-03-16 RX ADMIN — HEPARIN SODIUM 4000 UNITS: 1000 INJECTION INTRAVENOUS; SUBCUTANEOUS at 02:39

## 2023-03-16 RX ADMIN — INSULIN GLARGINE 5 UNITS: 100 INJECTION, SOLUTION SUBCUTANEOUS at 22:42

## 2023-03-16 RX ADMIN — SODIUM CHLORIDE, PRESERVATIVE FREE 10 ML: 5 INJECTION INTRAVENOUS at 22:44

## 2023-03-16 RX ADMIN — FENTANYL CITRATE 100 MCG: 50 INJECTION INTRAMUSCULAR; INTRAVENOUS at 17:25

## 2023-03-16 RX ADMIN — FENTANYL CITRATE 50 MCG: 50 INJECTION, SOLUTION INTRAMUSCULAR; INTRAVENOUS at 19:53

## 2023-03-16 RX ADMIN — EPINEPHRINE 1 MG: 0.1 INJECTION INTRACARDIAC; INTRAVENOUS at 17:04

## 2023-03-16 RX ADMIN — HEPARIN SODIUM AND DEXTROSE 9 UNITS/KG/HR: 10000; 5 INJECTION INTRAVENOUS at 02:40

## 2023-03-16 RX ADMIN — ALLOPURINOL 100 MG: 100 TABLET ORAL at 09:28

## 2023-03-16 RX ADMIN — AMIODARONE HYDROCHLORIDE 150 MG: 50 INJECTION, SOLUTION INTRAVENOUS at 17:05

## 2023-03-16 RX ADMIN — LEVOTHYROXINE SODIUM 175 MCG: 50 TABLET ORAL at 04:57

## 2023-03-16 RX ADMIN — LORAZEPAM 2 MG: 2 INJECTION INTRAMUSCULAR; INTRAVENOUS at 22:40

## 2023-03-16 RX ADMIN — INSULIN LISPRO 2 UNITS: 100 INJECTION, SOLUTION INTRAVENOUS; SUBCUTANEOUS at 22:42

## 2023-03-16 RX ADMIN — INSULIN LISPRO 4 UNITS: 100 INJECTION, SOLUTION INTRAVENOUS; SUBCUTANEOUS at 22:43

## 2023-03-16 RX ADMIN — MIDAZOLAM 4 MG: 1 INJECTION INTRAMUSCULAR; INTRAVENOUS at 17:26

## 2023-03-16 ASSESSMENT — PULMONARY FUNCTION TESTS
PIF_VALUE: 21
PIF_VALUE: 27
PIF_VALUE: 20

## 2023-03-16 ASSESSMENT — PAIN SCALES - GENERAL
PAINLEVEL_OUTOF10: 0
PAINLEVEL_OUTOF10: 0

## 2023-03-16 NOTE — PROGRESS NOTES
responded to [employer]. No family was present.  provided prayer while medical staff provided care and transported patient to CCU.  plans to follow up with patient.   Signed by  Mary Ellen Burnette M.Div.

## 2023-03-16 NOTE — PROGRESS NOTES
Cardiac Rehab: Spoke with patient regarding referral to cardiac rehab. Patient meets admission criteria based on NSTEMI (3/15/23). Pt is scheduled for CABG surgery 3/17/23. Written information about Cardiac Rehab given and reviewed with patient. Discussed lifestyle modifications to promote cardiac wellness. Patient indicated that he will want to participate in the cardiac rehab program when appropriate. We will follow up with him regarding participation in the [place] [id number] program when appropriate. His Cardiologist is Dr. Ashwin Barr.       Thank you,  RICHARD DonaldN, RN  Cardiopulmonary Rehabilitation Nurse Liaison  [facility]

## 2023-03-16 NOTE — CARE COORDINATION
Pt presented to the [facility] ED with chest pain. EKG was abnormal. STEMI was called and he was transported to [facility] for emergent cardiac catheterization. LHC showed severe multivessel disease. CTS consulted, CABG scheduled on Friday, 3/17/23. PTA, pt indep with his ADLs. Works [employer]. Lives with his spouse in a multi-level private residence. On RA. Denies current home care services. PCP confirmed. [place] verified and able to afford home medications. Will discern dcp following pts surgery. Will remain available. 03/16/23 1450   Service Assessment   Patient Orientation Alert and Oriented   Cognition Alert   History Provided By Patient   Primary Caregiver Self   Support Systems Spouse/Significant Other;Children;Family Members;[employer];Friends/Neighbors   PCP Verified by CM Yes  (Caryn)   Prior Functional Level Independent in ADLs/IADLs   Current Functional Level Independent in ADLs/IADLs   Can patient return to prior living arrangement Yes   Ability to make needs known: Good   Family able to assist with home care needs: Yes   Would you like for me to discuss the discharge plan with any other family members/significant others, and if so, who? Yes   Financial Resources Medicare   Community Resources None   Social/Functional History   Lives With Spouse   Type of [address]   Ambulation Assistance Independent   Transfer Assistance Independent   Active  Yes   Mode of Transportation Car   Occupation Full time employment   Discharge Planning   Type of [address] Prior To Admission None   Potential Assistance Needed N/A   DME Ordered?  No   Potential Assistance Purchasing Medications No   Type of Home Care Services None   Patient expects to be discharged to: [facility] Discharge   Transition of Care Consult (CM Consult) Discharge [address] Discharge None   [facility] Information Provided?  No   Mode of Transport at Discharge Other (see comment)  (Family)   Confirm Follow Up Transport Family

## 2023-03-16 NOTE — PROGRESS NOTES
CTS-pt denies any chest pain or dyspnea. He has decided to proceed with CABG surgery tomorrow. Renal function improved. All questions answered. Millie Montgomery.  TOMI Patrick

## 2023-03-16 NOTE — DIABETES MGMT
Patient admitted with NSTEMI. Blood glucose ranged 120-217 yesterday with patient receiving no diabetes medications. HgbA1C 8.8 (EAGBlood glucose this morning was 166 and now 280. Creatinine 1.90. GFR 39. Reviewed patient current regimen: Humalog correctional insulin. Patient would likely benefit from initiation of low dose basal insulin to improve glycemic control. Patient would also likely benefit from initiation of prandial insulin to help offset hyperglycemia during the day. Provider updated via [employer] regarding recommendations and glycemic control. Per chart review patient scheduled for CABG Friday and NPO after midnight tonight. New order received for Lantus 5 units daily and Humalog 2 units with meals. Patient seen for assessment regarding diabetes management. Patient has a past medical history of CAD, DM, HTN, HLD, nephropathy, retinopathy. Patient states they have been living with diabetes since 2005 years. Patient states they do have a working glucometer with supplies at home. Per patient they typically check blood glucose levels three times a day. Patient states they are currently taking Novolog TID 1 unit insulin per 9 gms carb, plus 3 units per 50 > 150, Metformin 1000 mg HS, Tresiba 92 units HS, and Ozempic 1 mg weekly at home for management of diabetes. Patient to go off floor for ultrasound. Will follow along with patient post-op as patient condition allows.

## 2023-03-16 NOTE — PROGRESS NOTES
Pt code blue from telemetry. Pt received an settled. Pt settled and put on CCU monitors, central line placed, and given sedation. Will continue to monitor.

## 2023-03-16 NOTE — PROGRESS NOTES
significant murmurs or rubs  Neurologic evaluation not possible due to code/altered mental status with intravenous fentanyl and Versed administered postresuscitation. Total 45 minutes critical care.     Shella Goodell, MD

## 2023-03-16 NOTE — PROGRESS NOTES
Spoke with isma from pharmacy to confirm new heaprin dosage. Pt XA resulted <0.10 so pt will receive 4,000 unit bolus and gtt rate will increase by 4units/kg/hr.   Isiahelenmadison [place] confirmed new dosing

## 2023-03-16 NOTE — PROGRESS NOTES
Spiritual Consult for [facility] [employer], [facility]. POA Attempted. [facility] attempted to visit PT, but PT was with Staff. [facility] prayed silently for PT, PT's Family, and Staff as chart states PT is Anne 5. Rev. Rik Ocampo M.Div.

## 2023-03-16 NOTE — PROGRESS NOTES
Spiritual Care Visit, initial visit. Advance Care Planning     Visited with patient at bedside. Shared that I had come to assist with Advance Directive. Patient stated that he and his wife had talked it over, and decided not to do so because, as his next of kin she is already his decision maker. Prayed with patient and family for patient's healing and health. Visit by Elizabeth Ramos, Staff .  M.Ed., Th.B., B.A.

## 2023-03-16 NOTE — CONSULTS
0-4 Units  0-4 Units SubCUTAneous Nightly    sodium chloride flush 0.9 % injection 5-40 mL  5-40 mL IntraVENous 2 times per day    sodium chloride flush 0.9 % injection 5-40 mL  5-40 mL IntraVENous PRN    0.9 % sodium chloride infusion   IntraVENous PRN    ondansetron (ZOFRAN-ODT) disintegrating tablet 4 mg  4 mg Oral Q8H PRN    Or    ondansetron (ZOFRAN) injection 4 mg  4 mg IntraVENous Q6H PRN    acetaminophen (TYLENOL) tablet 650 mg  650 mg Oral Q6H PRN    Or    acetaminophen (TYLENOL) suppository 650 mg  650 mg Rectal Q6H PRN    polyethylene glycol (GLYCOLAX) packet 17 g  17 g Oral Daily PRN    aspirin chewable tablet 81 mg  81 mg Oral Daily    nitroGLYCERIN (NITROSTAT) SL tablet 0.4 mg  0.4 mg SubLINGual Q5 Min PRN    potassium chloride (KLOR-CON M) extended release tablet 40 mEq  40 mEq Oral PRN    Or    potassium bicarb-citric acid (EFFER-K) effervescent tablet 40 mEq  40 mEq Oral PRN    Or    potassium chloride 10 mEq/100 mL IVPB (Peripheral Line)  10 mEq IntraVENous PRN    magnesium sulfate 2000 mg in 50 mL IVPB premix  2,000 mg IntraVENous PRN    aluminum & magnesium hydroxide-simethicone (MAALOX) 200-200-20 MG/5ML suspension 30 mL  30 mL Oral Q6H PRN    rosuvastatin (CRESTOR) tablet 40 mg  40 mg Oral Nightly    heparin (porcine) injection 4,000 Units  4,000 Units IntraVENous PRN    heparin (porcine) injection 2,000 Units  2,000 Units IntraVENous PRN    heparin 25,000 units in dextrose 5% 250 mL (premix) infusion  5-30 Units/kg/hr IntraVENous Continuous    0.9 % sodium chloride infusion   IntraVENous Continuous     No Known Allergies   Social History     Tobacco Use    Smoking status: Never    Smokeless tobacco: Never   Substance Use Topics    Alcohol use: Yes      No family history on file.      Review of Systems  Gen - no fever, no chills, appetite okay  HEENT - no sore throat, no decreased vision, no hearing loss  CV - + chest pain, no palpitation, no orthopnea  Lung - + exertional shortness of breath, Hyperlipidemia 02/03/2016       Impression:    Plan:   ANNMARIE/CKD 3b- in the setting of NSTEMI, vs new baseline, noted Cr 1.6s in 2019 and 2021   - UA bland, unremarkable renal US  -discussed risk of requiring dialysis s/p CABG, pt agrees to line placement and dialysis if imminent  -trending renal function    2. Chest pain- NSTEMI, elevated troponin and severe multivessel CAD- plan for CABG tomorrow  On heparin gtts     3. Hypertension - stable, hold ACEi and HCTZ for now    4.  DM type II- SSI per hosp

## 2023-03-17 ENCOUNTER — APPOINTMENT (OUTPATIENT)
Dept: GENERAL RADIOLOGY | Age: 68
DRG: 215 | End: 2023-03-17
Payer: COMMERCIAL

## 2023-03-17 ENCOUNTER — ANESTHESIA (OUTPATIENT)
Dept: SURGERY | Age: 68
End: 2023-03-17
Payer: COMMERCIAL

## 2023-03-17 ENCOUNTER — ANESTHESIA EVENT (OUTPATIENT)
Dept: SURGERY | Age: 68
End: 2023-03-17
Payer: COMMERCIAL

## 2023-03-17 PROBLEM — I21.4 ACUTE MYOCARDIAL INFARCTION, SUBENDOCARDIAL INFARCTION, INITIAL EPISODE OF CARE (HCC): Status: ACTIVE | Noted: 2023-03-17

## 2023-03-17 LAB
ABO + RH BLD: NORMAL
ACT AVERAGE - AAVG: 126 SEC
ACT AVERAGE - AAVG: 474 SEC
ACT AVERAGE - AAVG: 523 SEC
ACT AVERAGE - AAVG: 613 SEC
ACT AVERAGE - AAVG: 738 SEC
ANION GAP SERPL CALC-SCNC: 13 MMOL/L (ref 2–11)
ANION GAP SERPL CALC-SCNC: 8 MMOL/L (ref 2–11)
APTT PPP: 34.7 SEC (ref 24.5–34.2)
ARTERIAL PATENCY WRIST A: ABNORMAL
ARTERIAL PATENCY WRIST A: POSITIVE
BACTERIA SPEC CULT: ABNORMAL
BASE DEFICIT BLD-SCNC: 1.3 MMOL/L
BASE DEFICIT BLD-SCNC: 1.9 MMOL/L
BASE DEFICIT BLD-SCNC: 2.1 MMOL/L
BASE DEFICIT BLD-SCNC: 2.9 MMOL/L
BASE DEFICIT BLD-SCNC: 3.3 MMOL/L
BASE DEFICIT BLD-SCNC: 4.3 MMOL/L
BASE DEFICIT BLD-SCNC: 4.5 MMOL/L
BASE DEFICIT BLD-SCNC: 4.5 MMOL/L
BASE DEFICIT BLD-SCNC: 4.8 MMOL/L
BASE DEFICIT BLD-SCNC: 9 MMOL/L
BASE DEFICIT BLD-SCNC: 9.6 MMOL/L
BASE DEFICIT BLDV-SCNC: 2.1 MMOL/L
BASELINE ACT: 143 SEC
BASOPHILS # BLD: 0 K/UL (ref 0–0.2)
BASOPHILS NFR BLD: 0 % (ref 0–2)
BDY SITE: ABNORMAL
BLD PROD TYP BPU: NORMAL
BLOOD BANK DISPENSE STATUS: NORMAL
BLOOD GROUP ANTIBODIES SERPL: NORMAL
BPU ID: NORMAL
BUN SERPL-MCNC: 36 MG/DL (ref 8–23)
BUN SERPL-MCNC: 41 MG/DL (ref 8–23)
CA-I BLD-MCNC: 1.09 MMOL/L (ref 1.12–1.32)
CA-I BLD-MCNC: 1.1 MMOL/L (ref 1.12–1.32)
CA-I BLD-MCNC: 1.1 MMOL/L (ref 1.12–1.32)
CA-I BLD-MCNC: 1.11 MMOL/L (ref 1.12–1.32)
CA-I BLD-MCNC: 1.15 MMOL/L (ref 1.12–1.32)
CA-I BLD-MCNC: 1.17 MMOL/L (ref 1.12–1.32)
CA-I BLD-MCNC: 1.18 MMOL/L (ref 1.12–1.32)
CA-I BLD-MCNC: 1.23 MMOL/L (ref 1.12–1.32)
CALCIUM SERPL-MCNC: 8.1 MG/DL (ref 8.3–10.4)
CALCIUM SERPL-MCNC: 8.4 MG/DL (ref 8.3–10.4)
CHLORIDE SERPL-SCNC: 108 MMOL/L (ref 101–110)
CHLORIDE SERPL-SCNC: 109 MMOL/L (ref 101–110)
CIT FUNC FIB MAX AMP: 41.4 MM (ref 15–32)
CIT KAOLIN/HEP R-TIME: 7.5 MINS (ref 4.3–8.3)
CIT RAPIDTEG MAX AMP: 68.7 MM (ref 52–70)
CITRATED KAOLIN ANGLE: 76.3 DEG (ref 63–78)
CITRATED KAOLIN K TIME: 0.9 MINS (ref 0.8–2.1)
CITRATED KAOLIN MAX AMPLITUDE: 68.8 MM (ref 52–69)
CITRATED KAOLIN R TIME: 7.8 MINS (ref 4.6–9.1)
CO2 BLD-SCNC: 14 MMOL/L (ref 13–23)
CO2 BLD-SCNC: 19 MMOL/L (ref 13–23)
CO2 BLD-SCNC: 20 MMOL/L (ref 13–23)
CO2 BLD-SCNC: 21 MMOL/L (ref 13–23)
CO2 BLD-SCNC: 21 MMOL/L (ref 13–23)
CO2 BLD-SCNC: 22 MMOL/L (ref 13–23)
CO2 BLD-SCNC: 24 MMOL/L (ref 13–23)
CO2 BLD-SCNC: 24 MMOL/L (ref 13–23)
CO2 SERPL-SCNC: 16 MMOL/L (ref 21–32)
CO2 SERPL-SCNC: 23 MMOL/L (ref 21–32)
CREAT SERPL-MCNC: 2.4 MG/DL (ref 0.8–1.5)
CREAT SERPL-MCNC: 2.8 MG/DL (ref 0.8–1.5)
CROSSMATCH RESULT: NORMAL
DIFFERENTIAL METHOD BLD: ABNORMAL
EKG ATRIAL RATE: 92 BPM
EKG DIAGNOSIS: NORMAL
EKG P AXIS: 37 DEGREES
EKG P-R INTERVAL: 382 MS
EKG Q-T INTERVAL: 470 MS
EKG QRS DURATION: 100 MS
EKG QTC CALCULATION (BAZETT): 488 MS
EKG R AXIS: -38 DEGREES
EKG T AXIS: 94 DEGREES
EKG VENTRICULAR RATE: 65 BPM
EOSINOPHIL # BLD: 0 K/UL (ref 0–0.8)
EOSINOPHIL NFR BLD: 0 % (ref 0.5–7.8)
ERYTHROCYTE [DISTWIDTH] IN BLOOD BY AUTOMATED COUNT: 13.6 % (ref 11.9–14.6)
ERYTHROCYTE [DISTWIDTH] IN BLOOD BY AUTOMATED COUNT: 14.6 % (ref 11.9–14.6)
ERYTHROCYTE [DISTWIDTH] IN BLOOD BY AUTOMATED COUNT: 14.9 % (ref 11.9–14.6)
FIBRINOGEN PPP-MCNC: 516 MG/DL (ref 190–501)
FIO2 ON VENT: 100 %
FUNCT FIBRINOGEN LEVEL: 755.5 MG/DL (ref 278–581)
GAS FLOW.O2 O2 DELIVERY SYS: ABNORMAL
GLUCOSE BLD STRIP.AUTO-MCNC: 157 MG/DL (ref 65–100)
GLUCOSE BLD STRIP.AUTO-MCNC: 176 MG/DL (ref 65–100)
GLUCOSE BLD STRIP.AUTO-MCNC: 222 MG/DL (ref 65–100)
GLUCOSE BLD STRIP.AUTO-MCNC: 244 MG/DL (ref 65–100)
GLUCOSE BLD STRIP.AUTO-MCNC: 262 MG/DL (ref 65–100)
GLUCOSE BLD STRIP.AUTO-MCNC: 278 MG/DL (ref 65–100)
GLUCOSE BLD STRIP.AUTO-MCNC: 300 MG/DL (ref 65–100)
GLUCOSE BLD STRIP.AUTO-MCNC: 324 MG/DL (ref 65–100)
GLUCOSE BLD STRIP.AUTO-MCNC: 325 MG/DL (ref 65–100)
GLUCOSE BLD STRIP.AUTO-MCNC: 330 MG/DL (ref 65–100)
GLUCOSE BLD STRIP.AUTO-MCNC: 330 MG/DL (ref 65–100)
GLUCOSE BLD STRIP.AUTO-MCNC: 345 MG/DL (ref 65–100)
GLUCOSE BLD STRIP.AUTO-MCNC: 383 MG/DL (ref 65–100)
GLUCOSE BLD STRIP.AUTO-MCNC: 402 MG/DL (ref 65–100)
GLUCOSE BLD STRIP.AUTO-MCNC: 422 MG/DL (ref 65–100)
GLUCOSE BLD STRIP.AUTO-MCNC: 469 MG/DL (ref 65–100)
GLUCOSE BLD STRIP.AUTO-MCNC: 507 MG/DL (ref 65–100)
GLUCOSE SERPL-MCNC: 334 MG/DL (ref 65–100)
GLUCOSE SERPL-MCNC: 419 MG/DL (ref 65–100)
HCO3 BLD-SCNC: 14.9 MMOL/L (ref 22–26)
HCO3 BLD-SCNC: 15.4 MMOL/L (ref 22–26)
HCO3 BLD-SCNC: 20.3 MMOL/L (ref 22–26)
HCO3 BLD-SCNC: 20.9 MMOL/L (ref 22–26)
HCO3 BLD-SCNC: 21.5 MMOL/L (ref 22–26)
HCO3 BLD-SCNC: 21.8 MMOL/L (ref 22–26)
HCO3 BLD-SCNC: 22 MMOL/L (ref 22–26)
HCO3 BLD-SCNC: 22.5 MMOL/L (ref 22–26)
HCO3 BLD-SCNC: 22.5 MMOL/L (ref 22–26)
HCO3 BLD-SCNC: 24.3 MMOL/L (ref 22–26)
HCO3 BLD-SCNC: 24.5 MMOL/L (ref 22–26)
HCO3 BLDV-SCNC: 23.1 MMOL/L (ref 23–28)
HCT VFR BLD AUTO: 27.5 % (ref 41.1–50.3)
HCT VFR BLD AUTO: 28.2 % (ref 41.1–50.3)
HCT VFR BLD AUTO: 31.5 % (ref 41.1–50.3)
HEPARIN BOLUS-PATIENT: NORMAL UNITS
HEPARIN BOLUS-PUMP: 0 UNITS
HEPARIN BOLUS-TOTAL: NORMAL UNITS
HEPARIN REQUIRED-PATIENT: 0
HEPARIN REQUIRED-PATIENT: 0
HEPARIN REQUIRED-PATIENT: NORMAL
HEPARIN REQUIRED-TOTAL: 0 UNITS
HEPARIN REQUIRED-TOTAL: 0 UNITS
HEPARIN REQUIRED-TOTAL: NORMAL UNITS
HEPARIN TEST CONCENTRATE: 0 MG/KG
HEPARIN TEST CONCENTRATE: 3.5 MG/KG
HEPARIN TEST CONCENTRATE: 3.5 MG/KG
HGB BLD-MCNC: 10.2 G/DL (ref 13.6–17.2)
HGB BLD-MCNC: 9.1 G/DL (ref 13.6–17.2)
HGB BLD-MCNC: 9.4 G/DL (ref 13.6–17.2)
IMM GRANULOCYTES # BLD AUTO: 0 K/UL (ref 0–0.5)
IMM GRANULOCYTES NFR BLD AUTO: 1 % (ref 0–5)
INR PPP: 1.3
INSPIRATION.DURATION SETTING TIME VENT: 0.86 SEC
IPAP/PIP/HIGH PEEP: 26
IPAP/PIP: 23
LYMPHOCYTES # BLD: 0.4 K/UL (ref 0.5–4.6)
LYMPHOCYTES NFR BLD: 7 % (ref 13–44)
MAGNESIUM SERPL-MCNC: 2.8 MG/DL (ref 1.8–2.4)
MAGNESIUM SERPL-MCNC: 3.4 MG/DL (ref 1.8–2.4)
MAGNESIUM SERPL-MCNC: 3.8 MG/DL (ref 1.8–2.4)
MCH RBC QN AUTO: 28.9 PG (ref 26.1–32.9)
MCH RBC QN AUTO: 29.2 PG (ref 26.1–32.9)
MCH RBC QN AUTO: 30.5 PG (ref 26.1–32.9)
MCHC RBC AUTO-ENTMCNC: 32.4 G/DL (ref 31.4–35)
MCHC RBC AUTO-ENTMCNC: 33.1 G/DL (ref 31.4–35)
MCHC RBC AUTO-ENTMCNC: 33.3 G/DL (ref 31.4–35)
MCV RBC AUTO: 87.6 FL (ref 82–102)
MCV RBC AUTO: 89.2 FL (ref 82–102)
MCV RBC AUTO: 92.3 FL (ref 82–102)
MONOCYTES # BLD: 0.5 K/UL (ref 0.1–1.3)
MONOCYTES NFR BLD: 10 % (ref 4–12)
NEUTS SEG # BLD: 4.5 K/UL (ref 1.7–8.2)
NEUTS SEG NFR BLD: 82 % (ref 43–78)
NRBC # BLD: 0 K/UL (ref 0–0.2)
O2/TOTAL GAS SETTING VFR VENT: 100 %
O2/TOTAL GAS SETTING VFR VENT: 50 %
O2/TOTAL GAS SETTING VFR VENT: 60 %
O2/TOTAL GAS SETTING VFR VENT: 60 %
PAW @ MEAN EXP FLOW ON VENT: 16 CMH2O
PCO2 BLD: 27.6 MMHG (ref 35–45)
PCO2 BLD: 27.7 MMHG (ref 35–45)
PCO2 BLD: 33.9 MMHG (ref 35–45)
PCO2 BLD: 34.9 MMHG (ref 35–45)
PCO2 BLD: 40.5 MMHG (ref 35–45)
PCO2 BLD: 40.7 MMHG (ref 35–45)
PCO2 BLD: 42.9 MMHG (ref 35–45)
PCO2 BLD: 43 MMHG (ref 35–45)
PCO2 BLD: 44.1 MMHG (ref 35–45)
PCO2 BLD: 45.8 MMHG (ref 35–45)
PCO2 BLD: 48.1 MMHG (ref 35–45)
PCO2 BLDV: 40.3 MMHG (ref 41–51)
PEEP RESPIRATORY: 10
PEEP RESPIRATORY: 12 CMH2O
PH BLD: 7.31 (ref 7.35–7.45)
PH BLD: 7.32 (ref 7.35–7.45)
PH BLD: 7.32 (ref 7.35–7.45)
PH BLD: 7.34 (ref 7.35–7.45)
PH BLD: 7.34 (ref 7.35–7.45)
PH BLD: 7.35 (ref 7.35–7.45)
PH BLD: 7.35 (ref 7.35–7.45)
PH BLD: 7.37 (ref 7.35–7.45)
PH BLD: 7.42 (ref 7.35–7.45)
PH BLDV: 7.37 (ref 7.32–7.42)
PLATELET # BLD AUTO: 134 K/UL (ref 150–450)
PLATELET # BLD AUTO: 135 K/UL (ref 150–450)
PLATELET # BLD AUTO: 168 K/UL (ref 150–450)
PMV BLD AUTO: 10.5 FL (ref 9.4–12.3)
PMV BLD AUTO: 10.6 FL (ref 9.4–12.3)
PMV BLD AUTO: 10.6 FL (ref 9.4–12.3)
PO2 BLD: 111 MMHG (ref 75–100)
PO2 BLD: 116 MMHG (ref 75–100)
PO2 BLD: 133 MMHG (ref 75–100)
PO2 BLD: 141 MMHG (ref 75–100)
PO2 BLD: 150 MMHG (ref 75–100)
PO2 BLD: 150 MMHG (ref 75–100)
PO2 BLD: 361 MMHG (ref 75–100)
PO2 BLD: 402 MMHG (ref 75–100)
PO2 BLD: 473 MMHG (ref 75–100)
PO2 BLD: 76 MMHG (ref 75–100)
PO2 BLD: 80 MMHG (ref 75–100)
PO2 BLDV: 37 MMHG
POTASSIUM BLD-SCNC: 4.3 MMOL/L (ref 3.5–5.1)
POTASSIUM BLD-SCNC: 4.5 MMOL/L (ref 3.5–5.1)
POTASSIUM BLD-SCNC: 4.7 MMOL/L (ref 3.5–5.1)
POTASSIUM BLD-SCNC: 5.1 MMOL/L (ref 3.5–5.1)
POTASSIUM BLD-SCNC: 5.1 MMOL/L (ref 3.5–5.1)
POTASSIUM BLD-SCNC: 5.8 MMOL/L (ref 3.5–5.1)
POTASSIUM SERPL-SCNC: 4.2 MMOL/L (ref 3.5–5.1)
POTASSIUM SERPL-SCNC: 4.4 MMOL/L (ref 3.5–5.1)
POTASSIUM SERPL-SCNC: 5.2 MMOL/L (ref 3.5–5.1)
PROJECTED HEPARIN CONCENTATION: 3.2 MG/KG
PROTAMINE DOSE-PUMP: 0 MG
PROTAMINE DOSE-PUMP: 48 MG
PROTAMINE DOSE-PUMP: 48 MG
PROTAMINE DOSE-TOTAL: 0 MG
PROTAMINE DOSE-TOTAL: 391 MG
PROTAMINE DOSE-TOTAL: 391 MG
PROTHROMBIN TIME: 17 SEC (ref 12.6–14.3)
RBC # BLD AUTO: 2.98 M/UL (ref 4.23–5.6)
RBC # BLD AUTO: 3.22 M/UL (ref 4.23–5.6)
RBC # BLD AUTO: 3.53 M/UL (ref 4.23–5.6)
RESPIRATORY RATE, POC: 18 (ref 5–40)
RESPIRATORY RATE, POC: 18 (ref 5–40)
RESPIRATORY RATE, POC: 22 (ref 5–40)
RESPIRATORY RATE, POC: 26 (ref 5–40)
SAO2 % BLD: 100 %
SAO2 % BLD: 95 %
SAO2 % BLD: 96 %
SAO2 % BLD: 98 %
SAO2 % BLD: 98.4 % (ref 95–98)
SAO2 % BLD: 98.9 % (ref 95–98)
SAO2 % BLD: 99 %
SAO2 % BLD: 99 %
SAO2 % BLD: 99.4 % (ref 95–98)
SAO2 % BLDV: 68.2 % (ref 65–88)
SERVICE CMNT-IMP: ABNORMAL
SLOPE: 78
SODIUM BLD-SCNC: 135 MMOL/L (ref 136–145)
SODIUM BLD-SCNC: 135 MMOL/L (ref 136–145)
SODIUM BLD-SCNC: 136 MMOL/L (ref 136–145)
SODIUM BLD-SCNC: 137 MMOL/L (ref 136–145)
SODIUM BLD-SCNC: 138 MMOL/L (ref 136–145)
SODIUM BLD-SCNC: 138 MMOL/L (ref 136–145)
SODIUM SERPL-SCNC: 137 MMOL/L (ref 133–143)
SODIUM SERPL-SCNC: 140 MMOL/L (ref 133–143)
SPECIMEN EXP DATE BLD: NORMAL
SPECIMEN SITE: ABNORMAL
SPECIMEN TYPE: ABNORMAL
UFH PPP CHRO-ACNC: 0.26 IU/ML (ref 0.3–0.7)
UNIT DIVISION: 0
VENTILATION MODE VENT: ABNORMAL
VT SETTING VENT: 550 ML
WBC # BLD AUTO: 5.4 K/UL (ref 4.3–11.1)
WBC # BLD AUTO: 6.4 K/UL (ref 4.3–11.1)
WBC # BLD AUTO: 6.7 K/UL (ref 4.3–11.1)

## 2023-03-17 PROCEDURE — 2709999900 HC NON-CHARGEABLE SUPPLY: Performed by: THORACIC SURGERY (CARDIOTHORACIC VASCULAR SURGERY)

## 2023-03-17 PROCEDURE — 94003 VENT MGMT INPAT SUBQ DAY: CPT

## 2023-03-17 PROCEDURE — 85530 HEPARIN-PROTAMINE TOLERANCE: CPT

## 2023-03-17 PROCEDURE — A4216 STERILE WATER/SALINE, 10 ML: HCPCS | Performed by: INTERNAL MEDICINE

## 2023-03-17 PROCEDURE — 92950 HEART/LUNG RESUSCITATION CPR: CPT | Performed by: EMERGENCY MEDICINE

## 2023-03-17 PROCEDURE — 2580000003 HC RX 258: Performed by: NURSE PRACTITIONER

## 2023-03-17 PROCEDURE — 02L70CK OCCLUSION OF LEFT ATRIAL APPENDAGE WITH EXTRALUMINAL DEVICE, OPEN APPROACH: ICD-10-PCS | Performed by: THORACIC SURGERY (CARDIOTHORACIC VASCULAR SURGERY)

## 2023-03-17 PROCEDURE — 4A133BC MONITORING OF ARTERIAL PRESSURE, CORONARY, PERCUTANEOUS APPROACH: ICD-10-PCS | Performed by: THORACIC SURGERY (CARDIOTHORACIC VASCULAR SURGERY)

## 2023-03-17 PROCEDURE — 6360000002 HC RX W HCPCS: Performed by: NURSE ANESTHETIST, CERTIFIED REGISTERED

## 2023-03-17 PROCEDURE — 2580000003 HC RX 258: Performed by: INTERNAL MEDICINE

## 2023-03-17 PROCEDURE — 83735 ASSAY OF MAGNESIUM: CPT

## 2023-03-17 PROCEDURE — 3600000008 HC SURGERY OHS BASE: Performed by: THORACIC SURGERY (CARDIOTHORACIC VASCULAR SURGERY)

## 2023-03-17 PROCEDURE — P9047 ALBUMIN (HUMAN), 25%, 50ML: HCPCS

## 2023-03-17 PROCEDURE — 06BQ4ZZ EXCISION OF LEFT SAPHENOUS VEIN, PERCUTANEOUS ENDOSCOPIC APPROACH: ICD-10-PCS | Performed by: THORACIC SURGERY (CARDIOTHORACIC VASCULAR SURGERY)

## 2023-03-17 PROCEDURE — 2580000003 HC RX 258: Performed by: THORACIC SURGERY (CARDIOTHORACIC VASCULAR SURGERY)

## 2023-03-17 PROCEDURE — 93005 ELECTROCARDIOGRAM TRACING: CPT | Performed by: THORACIC SURGERY (CARDIOTHORACIC VASCULAR SURGERY)

## 2023-03-17 PROCEDURE — A4216 STERILE WATER/SALINE, 10 ML: HCPCS | Performed by: THORACIC SURGERY (CARDIOTHORACIC VASCULAR SURGERY)

## 2023-03-17 PROCEDURE — 6360000002 HC RX W HCPCS: Performed by: INTERNAL MEDICINE

## 2023-03-17 PROCEDURE — 6370000000 HC RX 637 (ALT 250 FOR IP): Performed by: THORACIC SURGERY (CARDIOTHORACIC VASCULAR SURGERY)

## 2023-03-17 PROCEDURE — 85610 PROTHROMBIN TIME: CPT

## 2023-03-17 PROCEDURE — 6370000000 HC RX 637 (ALT 250 FOR IP): Performed by: PHYSICIAN ASSISTANT

## 2023-03-17 PROCEDURE — 85025 COMPLETE CBC W/AUTO DIFF WBC: CPT

## 2023-03-17 PROCEDURE — 37799 UNLISTED PX VASCULAR SURGERY: CPT

## 2023-03-17 PROCEDURE — C1751 CATH, INF, PER/CENT/MIDLINE: HCPCS | Performed by: THORACIC SURGERY (CARDIOTHORACIC VASCULAR SURGERY)

## 2023-03-17 PROCEDURE — 6360000002 HC RX W HCPCS: Performed by: NURSE PRACTITIONER

## 2023-03-17 PROCEDURE — C1729 CATH, DRAINAGE: HCPCS | Performed by: THORACIC SURGERY (CARDIOTHORACIC VASCULAR SURGERY)

## 2023-03-17 PROCEDURE — 6360000002 HC RX W HCPCS: Performed by: THORACIC SURGERY (CARDIOTHORACIC VASCULAR SURGERY)

## 2023-03-17 PROCEDURE — C1713 ANCHOR/SCREW BN/BN,TIS/BN: HCPCS

## 2023-03-17 PROCEDURE — 85347 COAGULATION TIME ACTIVATED: CPT

## 2023-03-17 PROCEDURE — 33975 IMPLANT VENTRICULAR DEVICE: CPT | Performed by: INTERNAL MEDICINE

## 2023-03-17 PROCEDURE — C1769 GUIDE WIRE: HCPCS | Performed by: THORACIC SURGERY (CARDIOTHORACIC VASCULAR SURGERY)

## 2023-03-17 PROCEDURE — P9016 RBC LEUKOCYTES REDUCED: HCPCS

## 2023-03-17 PROCEDURE — P9045 ALBUMIN (HUMAN), 5%, 250 ML: HCPCS

## 2023-03-17 PROCEDURE — 92950 HEART/LUNG RESUSCITATION CPR: CPT

## 2023-03-17 PROCEDURE — 82962 GLUCOSE BLOOD TEST: CPT

## 2023-03-17 PROCEDURE — 2580000003 HC RX 258: Performed by: NURSE ANESTHETIST, CERTIFIED REGISTERED

## 2023-03-17 PROCEDURE — 99291 CRITICAL CARE FIRST HOUR: CPT | Performed by: INTERNAL MEDICINE

## 2023-03-17 PROCEDURE — 2500000003 HC RX 250 WO HCPCS: Performed by: THORACIC SURGERY (CARDIOTHORACIC VASCULAR SURGERY)

## 2023-03-17 PROCEDURE — 85520 HEPARIN ASSAY: CPT

## 2023-03-17 PROCEDURE — 6360000002 HC RX W HCPCS

## 2023-03-17 PROCEDURE — 85027 COMPLETE CBC AUTOMATED: CPT

## 2023-03-17 PROCEDURE — 71045 X-RAY EXAM CHEST 1 VIEW: CPT

## 2023-03-17 PROCEDURE — 3700000001 HC ADD 15 MINUTES (ANESTHESIA): Performed by: THORACIC SURGERY (CARDIOTHORACIC VASCULAR SURGERY)

## 2023-03-17 PROCEDURE — 6370000000 HC RX 637 (ALT 250 FOR IP): Performed by: EMERGENCY MEDICINE

## 2023-03-17 PROCEDURE — 6360000002 HC RX W HCPCS: Performed by: PHYSICIAN ASSISTANT

## 2023-03-17 PROCEDURE — P9041 ALBUMIN (HUMAN),5%, 50ML: HCPCS | Performed by: THORACIC SURGERY (CARDIOTHORACIC VASCULAR SURGERY)

## 2023-03-17 PROCEDURE — 2500000003 HC RX 250 WO HCPCS

## 2023-03-17 PROCEDURE — 02PA3RZ REMOVAL OF SHORT-TERM EXTERNAL HEART ASSIST SYSTEM FROM HEART, PERCUTANEOUS APPROACH: ICD-10-PCS | Performed by: THORACIC SURGERY (CARDIOTHORACIC VASCULAR SURGERY)

## 2023-03-17 PROCEDURE — 84295 ASSAY OF SERUM SODIUM: CPT

## 2023-03-17 PROCEDURE — B24BZZ4 ULTRASONOGRAPHY OF HEART WITH AORTA, TRANSESOPHAGEAL: ICD-10-PCS | Performed by: THORACIC SURGERY (CARDIOTHORACIC VASCULAR SURGERY)

## 2023-03-17 PROCEDURE — 6370000000 HC RX 637 (ALT 250 FOR IP): Performed by: INTERNAL MEDICINE

## 2023-03-17 PROCEDURE — 2500000003 HC RX 250 WO HCPCS: Performed by: NURSE PRACTITIONER

## 2023-03-17 PROCEDURE — 80048 BASIC METABOLIC PNL TOTAL CA: CPT

## 2023-03-17 PROCEDURE — 6360000002 HC RX W HCPCS: Performed by: EMERGENCY MEDICINE

## 2023-03-17 PROCEDURE — 2500000003 HC RX 250 WO HCPCS: Performed by: EMERGENCY MEDICINE

## 2023-03-17 PROCEDURE — 86900 BLOOD TYPING SEROLOGIC ABO: CPT

## 2023-03-17 PROCEDURE — 2720000010 HC SURG SUPPLY STERILE: Performed by: THORACIC SURGERY (CARDIOTHORACIC VASCULAR SURGERY)

## 2023-03-17 PROCEDURE — 85384 FIBRINOGEN ACTIVITY: CPT

## 2023-03-17 PROCEDURE — A4217 STERILE WATER/SALINE, 500 ML: HCPCS | Performed by: THORACIC SURGERY (CARDIOTHORACIC VASCULAR SURGERY)

## 2023-03-17 PROCEDURE — 2500000003 HC RX 250 WO HCPCS: Performed by: NURSE ANESTHETIST, CERTIFIED REGISTERED

## 2023-03-17 PROCEDURE — 84132 ASSAY OF SERUM POTASSIUM: CPT

## 2023-03-17 PROCEDURE — C9113 INJ PANTOPRAZOLE SODIUM, VIA: HCPCS | Performed by: INTERNAL MEDICINE

## 2023-03-17 PROCEDURE — C1889 IMPLANT/INSERT DEVICE, NOC: HCPCS | Performed by: THORACIC SURGERY (CARDIOTHORACIC VASCULAR SURGERY)

## 2023-03-17 PROCEDURE — 86923 COMPATIBILITY TEST ELECTRIC: CPT

## 2023-03-17 PROCEDURE — 85730 THROMBOPLASTIN TIME PARTIAL: CPT

## 2023-03-17 PROCEDURE — 82803 BLOOD GASES ANY COMBINATION: CPT

## 2023-03-17 PROCEDURE — 3700000000 HC ANESTHESIA ATTENDED CARE: Performed by: THORACIC SURGERY (CARDIOTHORACIC VASCULAR SURGERY)

## 2023-03-17 PROCEDURE — L8670 VASCULAR GRAFT, SYNTHETIC: HCPCS | Performed by: THORACIC SURGERY (CARDIOTHORACIC VASCULAR SURGERY)

## 2023-03-17 PROCEDURE — C1713 ANCHOR/SCREW BN/BN,TIS/BN: HCPCS | Performed by: THORACIC SURGERY (CARDIOTHORACIC VASCULAR SURGERY)

## 2023-03-17 PROCEDURE — 2580000003 HC RX 258

## 2023-03-17 PROCEDURE — 6370000000 HC RX 637 (ALT 250 FOR IP): Performed by: NURSE PRACTITIONER

## 2023-03-17 PROCEDURE — 02HV33Z INSERTION OF INFUSION DEVICE INTO SUPERIOR VENA CAVA, PERCUTANEOUS APPROACH: ICD-10-PCS | Performed by: THORACIC SURGERY (CARDIOTHORACIC VASCULAR SURGERY)

## 2023-03-17 PROCEDURE — 3600000018 HC SURGERY OHS ADDTL 15MIN: Performed by: THORACIC SURGERY (CARDIOTHORACIC VASCULAR SURGERY)

## 2023-03-17 PROCEDURE — 5A0221D ASSISTANCE WITH CARDIAC OUTPUT USING IMPELLER PUMP, CONTINUOUS: ICD-10-PCS | Performed by: THORACIC SURGERY (CARDIOTHORACIC VASCULAR SURGERY)

## 2023-03-17 PROCEDURE — 2100000000 HC CCU R&B

## 2023-03-17 PROCEDURE — 99233 SBSQ HOSP IP/OBS HIGH 50: CPT | Performed by: INTERNAL MEDICINE

## 2023-03-17 DEVICE — GRAFT VASC L300MM OD10MM UNIV STR STD WALL N TAPR NRINGED: Type: IMPLANTABLE DEVICE | Site: HEART | Status: FUNCTIONAL

## 2023-03-17 DEVICE — CLIP MED SUTURE LESS 40 MM SYS 1 HND STRL ATRICLIP FLX V: Type: IMPLANTABLE DEVICE | Site: HEART | Status: FUNCTIONAL

## 2023-03-17 RX ORDER — DEXTROSE, SODIUM CHLORIDE, AND POTASSIUM CHLORIDE 5; .45; .15 G/100ML; G/100ML; G/100ML
INJECTION INTRAVENOUS CONTINUOUS
Status: DISCONTINUED | OUTPATIENT
Start: 2023-03-17 | End: 2023-03-24

## 2023-03-17 RX ORDER — NOREPINEPHRINE BIT/0.9 % NACL 16MG/250ML
.01-3.3 INFUSION BOTTLE (ML) INTRAVENOUS CONTINUOUS PRN
Status: DISCONTINUED | OUTPATIENT
Start: 2023-03-17 | End: 2023-03-24

## 2023-03-17 RX ORDER — MIDAZOLAM HYDROCHLORIDE 1 MG/ML
INJECTION INTRAMUSCULAR; INTRAVENOUS PRN
Status: DISCONTINUED | OUTPATIENT
Start: 2023-03-17 | End: 2023-03-17 | Stop reason: SDUPTHER

## 2023-03-17 RX ORDER — CEFAZOLIN SODIUM 1 G/3ML
INJECTION, POWDER, FOR SOLUTION INTRAMUSCULAR; INTRAVENOUS PRN
Status: DISCONTINUED | OUTPATIENT
Start: 2023-03-17 | End: 2023-03-17 | Stop reason: SDUPTHER

## 2023-03-17 RX ORDER — SODIUM CHLORIDE 9 MG/ML
INJECTION, SOLUTION INTRAVENOUS PRN
Status: CANCELLED | OUTPATIENT
Start: 2023-03-17

## 2023-03-17 RX ORDER — DEXTROSE MONOHYDRATE 25 G/50ML
INJECTION, SOLUTION INTRAVENOUS
Status: COMPLETED | OUTPATIENT
Start: 2023-03-17 | End: 2023-03-17

## 2023-03-17 RX ORDER — CHLORHEXIDINE GLUCONATE 0.12 MG/ML
10 RINSE ORAL 2 TIMES DAILY
Status: DISCONTINUED | OUTPATIENT
Start: 2023-03-17 | End: 2023-03-24

## 2023-03-17 RX ORDER — DEXMEDETOMIDINE HYDROCHLORIDE 4 UG/ML
INJECTION, SOLUTION INTRAVENOUS CONTINUOUS PRN
Status: DISCONTINUED | OUTPATIENT
Start: 2023-03-17 | End: 2023-03-17 | Stop reason: SDUPTHER

## 2023-03-17 RX ORDER — FAMOTIDINE 20 MG/1
20 TABLET, FILM COATED ORAL DAILY
Status: DISCONTINUED | OUTPATIENT
Start: 2023-03-17 | End: 2023-03-24

## 2023-03-17 RX ORDER — SODIUM CHLORIDE 9 MG/ML
INJECTION, SOLUTION INTRAVENOUS CONTINUOUS PRN
Status: DISCONTINUED | OUTPATIENT
Start: 2023-03-17 | End: 2023-03-17 | Stop reason: SDUPTHER

## 2023-03-17 RX ORDER — SODIUM CHLORIDE 0.9 % (FLUSH) 0.9 %
5-40 SYRINGE (ML) INJECTION PRN
Status: CANCELLED | OUTPATIENT
Start: 2023-03-17

## 2023-03-17 RX ORDER — MORPHINE SULFATE 4 MG/ML
4 INJECTION, SOLUTION INTRAMUSCULAR; INTRAVENOUS
Status: DISCONTINUED | OUTPATIENT
Start: 2023-03-17 | End: 2023-03-24

## 2023-03-17 RX ORDER — VECURONIUM BROMIDE 1 MG/ML
INJECTION, POWDER, LYOPHILIZED, FOR SOLUTION INTRAVENOUS PRN
Status: DISCONTINUED | OUTPATIENT
Start: 2023-03-17 | End: 2023-03-17 | Stop reason: SDUPTHER

## 2023-03-17 RX ORDER — AMIODARONE HYDROCHLORIDE 50 MG/ML
INJECTION, SOLUTION INTRAVENOUS
Status: COMPLETED | OUTPATIENT
Start: 2023-03-17 | End: 2023-03-17

## 2023-03-17 RX ORDER — SODIUM CHLORIDE, SODIUM LACTATE, POTASSIUM CHLORIDE, CALCIUM CHLORIDE 600; 310; 30; 20 MG/100ML; MG/100ML; MG/100ML; MG/100ML
INJECTION, SOLUTION INTRAVENOUS CONTINUOUS
Status: CANCELLED | OUTPATIENT
Start: 2023-03-17

## 2023-03-17 RX ORDER — FENTANYL CITRATE 50 UG/ML
100 INJECTION, SOLUTION INTRAMUSCULAR; INTRAVENOUS
Status: CANCELLED | OUTPATIENT
Start: 2023-03-17 | End: 2023-03-18

## 2023-03-17 RX ORDER — MIDAZOLAM HYDROCHLORIDE 2 MG/2ML
1 INJECTION, SOLUTION INTRAMUSCULAR; INTRAVENOUS
Status: DISCONTINUED | OUTPATIENT
Start: 2023-03-17 | End: 2023-03-24

## 2023-03-17 RX ORDER — MORPHINE SULFATE 4 MG/ML
3 INJECTION, SOLUTION INTRAMUSCULAR; INTRAVENOUS
Status: DISCONTINUED | OUTPATIENT
Start: 2023-03-17 | End: 2023-03-24

## 2023-03-17 RX ORDER — NOREPINEPHRINE BIT/0.9 % NACL 16MG/250ML
.01-3.3 INFUSION BOTTLE (ML) INTRAVENOUS CONTINUOUS PRN
Status: DISCONTINUED | OUTPATIENT
Start: 2023-03-17 | End: 2023-03-17 | Stop reason: SDUPTHER

## 2023-03-17 RX ORDER — HEPARIN SODIUM 1000 [USP'U]/ML
INJECTION, SOLUTION INTRAVENOUS; SUBCUTANEOUS PRN
Status: DISCONTINUED | OUTPATIENT
Start: 2023-03-17 | End: 2023-03-17 | Stop reason: SDUPTHER

## 2023-03-17 RX ORDER — INSULIN LISPRO 100 [IU]/ML
12 INJECTION, SOLUTION INTRAVENOUS; SUBCUTANEOUS ONCE
Status: COMPLETED | OUTPATIENT
Start: 2023-03-17 | End: 2023-03-17

## 2023-03-17 RX ORDER — ALBUMIN, HUMAN INJ 5% 5 %
SOLUTION INTRAVENOUS CONTINUOUS PRN
Status: COMPLETED | OUTPATIENT
Start: 2023-03-17 | End: 2023-03-17

## 2023-03-17 RX ORDER — FENTANYL CITRATE-0.9 % NACL/PF 10 MCG/ML
25-200 PLASTIC BAG, INJECTION (ML) INTRAVENOUS CONTINUOUS
Status: DISCONTINUED | OUTPATIENT
Start: 2023-03-17 | End: 2023-03-24

## 2023-03-17 RX ORDER — EPINEPHRINE 0.1 MG/ML
SYRINGE (ML) INJECTION
Status: COMPLETED | OUTPATIENT
Start: 2023-03-17 | End: 2023-03-17

## 2023-03-17 RX ORDER — MAGNESIUM SULFATE 1 G/100ML
1000 INJECTION INTRAVENOUS PRN
Status: DISCONTINUED | OUTPATIENT
Start: 2023-03-17 | End: 2023-03-24

## 2023-03-17 RX ORDER — POTASSIUM CHLORIDE 29.8 MG/ML
20 INJECTION INTRAVENOUS PRN
Status: DISCONTINUED | OUTPATIENT
Start: 2023-03-17 | End: 2023-03-24

## 2023-03-17 RX ORDER — DOPAMINE HYDROCHLORIDE 320 MG/100ML
INJECTION, SOLUTION INTRAVENOUS CONTINUOUS PRN
Status: DISCONTINUED | OUTPATIENT
Start: 2023-03-17 | End: 2023-03-17 | Stop reason: SDUPTHER

## 2023-03-17 RX ORDER — LIDOCAINE HYDROCHLORIDE 20 MG/ML
INJECTION, SOLUTION INTRAVENOUS
Status: COMPLETED | OUTPATIENT
Start: 2023-03-17 | End: 2023-03-17

## 2023-03-17 RX ORDER — HEPARIN SODIUM 5000 [USP'U]/ML
INJECTION, SOLUTION INTRAVENOUS; SUBCUTANEOUS PRN
Status: DISCONTINUED | OUTPATIENT
Start: 2023-03-17 | End: 2023-03-17 | Stop reason: HOSPADM

## 2023-03-17 RX ORDER — OXYCODONE HYDROCHLORIDE AND ACETAMINOPHEN 5; 325 MG/1; MG/1
1 TABLET ORAL EVERY 4 HOURS PRN
Status: DISCONTINUED | OUTPATIENT
Start: 2023-03-17 | End: 2023-03-27 | Stop reason: HOSPADM

## 2023-03-17 RX ORDER — AMIODARONE HYDROCHLORIDE 200 MG/1
200 TABLET ORAL 2 TIMES DAILY
Status: DISCONTINUED | OUTPATIENT
Start: 2023-03-17 | End: 2023-03-27 | Stop reason: HOSPADM

## 2023-03-17 RX ORDER — SODIUM CHLORIDE 0.9 % (FLUSH) 0.9 %
5-40 SYRINGE (ML) INJECTION EVERY 12 HOURS SCHEDULED
Status: DISCONTINUED | OUTPATIENT
Start: 2023-03-17 | End: 2023-03-24 | Stop reason: SDUPTHER

## 2023-03-17 RX ORDER — ROCURONIUM BROMIDE 10 MG/ML
INJECTION, SOLUTION INTRAVENOUS PRN
Status: DISCONTINUED | OUTPATIENT
Start: 2023-03-17 | End: 2023-03-17 | Stop reason: SDUPTHER

## 2023-03-17 RX ORDER — ACETAMINOPHEN 325 MG/1
650 TABLET ORAL EVERY 4 HOURS PRN
Status: DISCONTINUED | OUTPATIENT
Start: 2023-03-17 | End: 2023-03-17 | Stop reason: SDUPTHER

## 2023-03-17 RX ORDER — MIDAZOLAM HYDROCHLORIDE 1 MG/ML
1-10 INJECTION, SOLUTION INTRAVENOUS CONTINUOUS
Status: DISCONTINUED | OUTPATIENT
Start: 2023-03-17 | End: 2023-03-24

## 2023-03-17 RX ORDER — AMINOCAPROIC ACID 250 MG/ML
INJECTION, SOLUTION INTRAVENOUS PRN
Status: DISCONTINUED | OUTPATIENT
Start: 2023-03-17 | End: 2023-03-17 | Stop reason: SDUPTHER

## 2023-03-17 RX ORDER — FENTANYL CITRATE 0.05 MG/ML
INJECTION, SOLUTION INTRAMUSCULAR; INTRAVENOUS PRN
Status: DISCONTINUED | OUTPATIENT
Start: 2023-03-17 | End: 2023-03-17 | Stop reason: SDUPTHER

## 2023-03-17 RX ORDER — ACETAMINOPHEN 500 MG
1000 TABLET ORAL ONCE
Status: CANCELLED | OUTPATIENT
Start: 2023-03-17 | End: 2023-03-17

## 2023-03-17 RX ORDER — PROTAMINE SULFATE 10 MG/ML
INJECTION, SOLUTION INTRAVENOUS PRN
Status: DISCONTINUED | OUTPATIENT
Start: 2023-03-17 | End: 2023-03-17 | Stop reason: SDUPTHER

## 2023-03-17 RX ORDER — ONDANSETRON 2 MG/ML
4 INJECTION INTRAMUSCULAR; INTRAVENOUS EVERY 4 HOURS PRN
Status: DISCONTINUED | OUTPATIENT
Start: 2023-03-17 | End: 2023-03-24 | Stop reason: SDUPTHER

## 2023-03-17 RX ORDER — SODIUM CHLORIDE 0.9 % (FLUSH) 0.9 %
5-40 SYRINGE (ML) INJECTION EVERY 12 HOURS SCHEDULED
Status: CANCELLED | OUTPATIENT
Start: 2023-03-17

## 2023-03-17 RX ORDER — DOPAMINE HYDROCHLORIDE 320 MG/100ML
1-20 INJECTION, SOLUTION INTRAVENOUS CONTINUOUS
Status: DISCONTINUED | OUTPATIENT
Start: 2023-03-17 | End: 2023-03-24

## 2023-03-17 RX ORDER — NITROGLYCERIN 20 MG/100ML
0-100 INJECTION INTRAVENOUS CONTINUOUS PRN
Status: DISCONTINUED | OUTPATIENT
Start: 2023-03-17 | End: 2023-03-24

## 2023-03-17 RX ORDER — SODIUM CHLORIDE 0.9 % (FLUSH) 0.9 %
5-40 SYRINGE (ML) INJECTION PRN
Status: DISCONTINUED | OUTPATIENT
Start: 2023-03-17 | End: 2023-03-24 | Stop reason: SDUPTHER

## 2023-03-17 RX ORDER — SODIUM CHLORIDE, SODIUM LACTATE, POTASSIUM CHLORIDE, CALCIUM CHLORIDE 600; 310; 30; 20 MG/100ML; MG/100ML; MG/100ML; MG/100ML
INJECTION, SOLUTION INTRAVENOUS CONTINUOUS PRN
Status: DISCONTINUED | OUTPATIENT
Start: 2023-03-17 | End: 2023-03-17 | Stop reason: SDUPTHER

## 2023-03-17 RX ORDER — NITROGLYCERIN 20 MG/100ML
INJECTION INTRAVENOUS CONTINUOUS PRN
Status: DISCONTINUED | OUTPATIENT
Start: 2023-03-17 | End: 2023-03-17 | Stop reason: SDUPTHER

## 2023-03-17 RX ORDER — CALCIUM GLUCONATE 20 MG/ML
2000 INJECTION, SOLUTION INTRAVENOUS ONCE
Status: COMPLETED | OUTPATIENT
Start: 2023-03-17 | End: 2023-03-17

## 2023-03-17 RX ORDER — ASPIRIN 81 MG/1
81 TABLET ORAL DAILY
Status: DISCONTINUED | OUTPATIENT
Start: 2023-03-18 | End: 2023-03-27 | Stop reason: HOSPADM

## 2023-03-17 RX ORDER — ATORVASTATIN CALCIUM 80 MG/1
80 TABLET, FILM COATED ORAL NIGHTLY
Status: DISCONTINUED | OUTPATIENT
Start: 2023-03-17 | End: 2023-03-27 | Stop reason: HOSPADM

## 2023-03-17 RX ORDER — MIDAZOLAM HYDROCHLORIDE 2 MG/2ML
2 INJECTION, SOLUTION INTRAMUSCULAR; INTRAVENOUS
Status: CANCELLED | OUTPATIENT
Start: 2023-03-17 | End: 2023-03-18

## 2023-03-17 RX ORDER — SODIUM CHLORIDE 9 MG/ML
INJECTION, SOLUTION INTRAVENOUS PRN
Status: DISCONTINUED | OUTPATIENT
Start: 2023-03-17 | End: 2023-03-24 | Stop reason: SDUPTHER

## 2023-03-17 RX ORDER — PAPAVERINE HYDROCHLORIDE 30 MG/ML
INJECTION INTRAMUSCULAR; INTRAVENOUS PRN
Status: DISCONTINUED | OUTPATIENT
Start: 2023-03-17 | End: 2023-03-17 | Stop reason: HOSPADM

## 2023-03-17 RX ADMIN — SODIUM CHLORIDE, PRESERVATIVE FREE 10 ML: 5 INJECTION INTRAVENOUS at 20:37

## 2023-03-17 RX ADMIN — HEPARIN SODIUM 40000 UNITS: 1000 INJECTION, SOLUTION INTRAVENOUS; SUBCUTANEOUS at 10:21

## 2023-03-17 RX ADMIN — NOREPINEPHRINE BITARTRATE 8 MCG: 1 SOLUTION INTRAVENOUS at 09:31

## 2023-03-17 RX ADMIN — SODIUM BICARBONATE 100 MEQ: 84 INJECTION, SOLUTION INTRAVENOUS at 05:41

## 2023-03-17 RX ADMIN — NITROGLYCERIN 10 MCG/MIN: 20 INJECTION INTRAVENOUS at 16:10

## 2023-03-17 RX ADMIN — NOREPINEPHRINE BITARTRATE 8 MCG: 1 SOLUTION INTRAVENOUS at 11:11

## 2023-03-17 RX ADMIN — Medication 2000 MG: at 08:51

## 2023-03-17 RX ADMIN — AMINOCAPROIC ACID 1 G/HR: 250 INJECTION, SOLUTION INTRAVENOUS at 09:23

## 2023-03-17 RX ADMIN — FENTANYL CITRATE 250 MCG: 0.05 INJECTION, SOLUTION INTRAMUSCULAR; INTRAVENOUS at 09:29

## 2023-03-17 RX ADMIN — NOREPINEPHRINE BITARTRATE 16 MCG: 1 SOLUTION INTRAVENOUS at 09:01

## 2023-03-17 RX ADMIN — MIDAZOLAM 1 MG/HR: 5 INJECTION INTRAMUSCULAR; INTRAVENOUS at 16:49

## 2023-03-17 RX ADMIN — NOREPINEPHRINE BITARTRATE 8 MCG: 1 SOLUTION INTRAVENOUS at 08:52

## 2023-03-17 RX ADMIN — PROTAMINE SULFATE 350 MG: 10 INJECTION, SOLUTION INTRAVENOUS at 13:02

## 2023-03-17 RX ADMIN — INSULIN HUMAN 10 UNITS: 100 INJECTION, SOLUTION PARENTERAL at 05:32

## 2023-03-17 RX ADMIN — DEXMEDETOMIDINE HYDROCHLORIDE 0.5 MCG/KG/HR: 4 INJECTION, SOLUTION INTRAVENOUS at 13:26

## 2023-03-17 RX ADMIN — NITROGLYCERIN 10 MCG/MIN: 20 INJECTION INTRAVENOUS at 12:29

## 2023-03-17 RX ADMIN — SODIUM BICARBONATE 100 MEQ: 84 INJECTION, SOLUTION INTRAVENOUS at 04:50

## 2023-03-17 RX ADMIN — VECURONIUM BROMIDE 2 MG: 1 INJECTION, POWDER, LYOPHILIZED, FOR SOLUTION INTRAVENOUS at 12:05

## 2023-03-17 RX ADMIN — MIDAZOLAM 2 MG: 1 INJECTION INTRAMUSCULAR; INTRAVENOUS at 12:05

## 2023-03-17 RX ADMIN — VECURONIUM BROMIDE 2 MG: 1 INJECTION, POWDER, LYOPHILIZED, FOR SOLUTION INTRAVENOUS at 10:33

## 2023-03-17 RX ADMIN — PANTOPRAZOLE SODIUM 40 MG: 40 INJECTION, POWDER, LYOPHILIZED, FOR SOLUTION INTRAVENOUS at 08:00

## 2023-03-17 RX ADMIN — SODIUM CHLORIDE 9.6 UNITS/HR: 9 INJECTION, SOLUTION INTRAVENOUS at 18:13

## 2023-03-17 RX ADMIN — INSULIN LISPRO 8 UNITS: 100 INJECTION, SOLUTION INTRAVENOUS; SUBCUTANEOUS at 08:15

## 2023-03-17 RX ADMIN — SODIUM CHLORIDE: 900 INJECTION INTRAVENOUS at 08:36

## 2023-03-17 RX ADMIN — ROCURONIUM BROMIDE 30 MG: 10 INJECTION, SOLUTION INTRAVENOUS at 13:40

## 2023-03-17 RX ADMIN — FENTANYL CITRATE 150 MCG: 0.05 INJECTION, SOLUTION INTRAMUSCULAR; INTRAVENOUS at 10:33

## 2023-03-17 RX ADMIN — PHENYLEPHRINE HYDROCHLORIDE 20 MCG/MIN: 10 INJECTION INTRAVENOUS at 08:51

## 2023-03-17 RX ADMIN — LIDOCAINE HYDROCHLORIDE 50 MG: 20 INJECTION, SOLUTION INTRAVENOUS at 06:20

## 2023-03-17 RX ADMIN — SODIUM BICARBONATE: 84 INJECTION, SOLUTION INTRAVENOUS at 15:15

## 2023-03-17 RX ADMIN — Medication 0.02 MCG/KG/MIN: at 12:29

## 2023-03-17 RX ADMIN — NOREPINEPHRINE BITARTRATE 8 MCG: 1 SOLUTION INTRAVENOUS at 11:16

## 2023-03-17 RX ADMIN — CEFAZOLIN SODIUM 2000 MG: 100 INJECTION, POWDER, LYOPHILIZED, FOR SOLUTION INTRAVENOUS at 20:31

## 2023-03-17 RX ADMIN — MIDAZOLAM HYDROCHLORIDE 2 MG: 1 INJECTION, SOLUTION INTRAMUSCULAR; INTRAVENOUS at 15:59

## 2023-03-17 RX ADMIN — FENTANYL CITRATE 50 MCG/HR: 50 INJECTION, SOLUTION INTRAMUSCULAR; INTRAVENOUS at 16:48

## 2023-03-17 RX ADMIN — AMIODARONE HYDROCHLORIDE 150 MG: 50 INJECTION, SOLUTION INTRAVENOUS at 05:33

## 2023-03-17 RX ADMIN — TUBERCULIN PURIFIED PROTEIN DERIVATIVE 5 UNITS: 5 INJECTION, SOLUTION INTRADERMAL at 23:13

## 2023-03-17 RX ADMIN — ROCURONIUM BROMIDE 50 MG: 10 INJECTION, SOLUTION INTRAVENOUS at 08:52

## 2023-03-17 RX ADMIN — MIDAZOLAM 5 MG: 1 INJECTION INTRAMUSCULAR; INTRAVENOUS at 08:36

## 2023-03-17 RX ADMIN — FENTANYL CITRATE 100 MCG: 0.05 INJECTION, SOLUTION INTRAMUSCULAR; INTRAVENOUS at 13:45

## 2023-03-17 RX ADMIN — INSULIN LISPRO 12 UNITS: 100 INJECTION, SOLUTION INTRAVENOUS; SUBCUTANEOUS at 08:17

## 2023-03-17 RX ADMIN — FENTANYL CITRATE 50 MCG: 50 INJECTION, SOLUTION INTRAMUSCULAR; INTRAVENOUS at 07:38

## 2023-03-17 RX ADMIN — CEFAZOLIN 2 G: 1 INJECTION, POWDER, FOR SOLUTION INTRAMUSCULAR; INTRAVENOUS at 12:21

## 2023-03-17 RX ADMIN — AMIODARONE HYDROCHLORIDE 150 MG: 50 INJECTION, SOLUTION INTRAVENOUS at 05:03

## 2023-03-17 RX ADMIN — EPINEPHRINE 1 MG: 0.1 INJECTION INTRACARDIAC; INTRAVENOUS at 05:12

## 2023-03-17 RX ADMIN — FENTANYL CITRATE 100 MCG: 0.05 INJECTION, SOLUTION INTRAMUSCULAR; INTRAVENOUS at 10:36

## 2023-03-17 RX ADMIN — NOREPINEPHRINE BITARTRATE 4 MCG: 1 SOLUTION INTRAVENOUS at 10:46

## 2023-03-17 RX ADMIN — Medication 1 AMPULE: at 20:39

## 2023-03-17 RX ADMIN — Medication 3 AMPULE: at 06:31

## 2023-03-17 RX ADMIN — AMIODARONE HYDROCHLORIDE 1 MG/MIN: 50 INJECTION, SOLUTION INTRAVENOUS at 05:06

## 2023-03-17 RX ADMIN — LIDOCAINE HYDROCHLORIDE 100 MG: 20 INJECTION, SOLUTION INTRAVENOUS at 05:11

## 2023-03-17 RX ADMIN — FENTANYL CITRATE 75 MCG/HR: 50 INJECTION, SOLUTION INTRAMUSCULAR; INTRAVENOUS at 06:03

## 2023-03-17 RX ADMIN — SODIUM CHLORIDE, SODIUM LACTATE, POTASSIUM CHLORIDE, AND CALCIUM CHLORIDE: 600; 310; 30; 20 INJECTION, SOLUTION INTRAVENOUS at 08:36

## 2023-03-17 RX ADMIN — AMINOCAPROIC ACID 10000 MG: 250 INJECTION, SOLUTION INTRAVENOUS at 09:00

## 2023-03-17 RX ADMIN — CHLORHEXIDINE GLUCONATE 10 ML: 1.2 SOLUTION ORAL at 20:31

## 2023-03-17 RX ADMIN — FENTANYL CITRATE 100 MCG/HR: 50 INJECTION, SOLUTION INTRAMUSCULAR; INTRAVENOUS at 18:40

## 2023-03-17 RX ADMIN — DEXTROSE MONOHYDRATE 25 G: 25 INJECTION, SOLUTION INTRAVENOUS at 05:33

## 2023-03-17 RX ADMIN — DOPAMINE HYDROCHLORIDE IN DEXTROSE 2 MCG/KG/MIN: 3.2 INJECTION, SOLUTION INTRAVENOUS at 11:32

## 2023-03-17 RX ADMIN — HEPARIN SODIUM 10000 UNITS: 1000 INJECTION, SOLUTION INTRAVENOUS; SUBCUTANEOUS at 10:57

## 2023-03-17 RX ADMIN — VECURONIUM BROMIDE 4 MG: 1 INJECTION, POWDER, LYOPHILIZED, FOR SOLUTION INTRAVENOUS at 09:29

## 2023-03-17 RX ADMIN — ATORVASTATIN CALCIUM 80 MG: 80 TABLET, FILM COATED ORAL at 20:31

## 2023-03-17 RX ADMIN — ACETAMINOPHEN 650 MG: 325 TABLET ORAL at 20:40

## 2023-03-17 RX ADMIN — DEXTROSE MONOHYDRATE, SODIUM CHLORIDE, AND POTASSIUM CHLORIDE: 50; 4.5; 1.49 INJECTION, SOLUTION INTRAVENOUS at 17:00

## 2023-03-17 RX ADMIN — FAMOTIDINE 20 MG: 10 INJECTION, SOLUTION INTRAVENOUS at 17:25

## 2023-03-17 RX ADMIN — NOREPINEPHRINE BITARTRATE 4 MCG: 1 SOLUTION INTRAVENOUS at 10:47

## 2023-03-17 RX ADMIN — NOREPINEPHRINE BITARTRATE 16 MCG: 1 SOLUTION INTRAVENOUS at 08:57

## 2023-03-17 RX ADMIN — CALCIUM GLUCONATE 2000 MG: 20 INJECTION, SOLUTION INTRAVENOUS at 05:34

## 2023-03-17 RX ADMIN — NOREPINEPHRINE BITARTRATE 8 MCG: 1 SOLUTION INTRAVENOUS at 08:49

## 2023-03-17 RX ADMIN — ACETAMINOPHEN 650 MG: 650 SUPPOSITORY RECTAL at 04:31

## 2023-03-17 RX ADMIN — FENTANYL CITRATE 200 MCG: 0.05 INJECTION, SOLUTION INTRAMUSCULAR; INTRAVENOUS at 08:36

## 2023-03-17 RX ADMIN — FENTANYL CITRATE 50 MCG: 0.05 INJECTION, SOLUTION INTRAMUSCULAR; INTRAVENOUS at 13:56

## 2023-03-17 RX ADMIN — MIDAZOLAM HYDROCHLORIDE 2 MG: 1 INJECTION, SOLUTION INTRAMUSCULAR; INTRAVENOUS at 00:16

## 2023-03-17 ASSESSMENT — PULMONARY FUNCTION TESTS
PIF_VALUE: 26
PIF_VALUE: 25
PIF_VALUE: 18
PIF_VALUE: 25
PIF_VALUE: 22
PIF_VALUE: 26

## 2023-03-17 ASSESSMENT — PAIN SCALES - GENERAL
PAINLEVEL_OUTOF10: 0
PAINLEVEL_OUTOF10: 5
PAINLEVEL_OUTOF10: 0
PAINLEVEL_OUTOF10: 0

## 2023-03-17 NOTE — PROGRESS NOTES
TRANSFER - IN REPORT:    Verbal report received from Steph(name) on [facility]  being received from OR(unit) for routine post-op      Report consisted of patients Situation, Background, Assessment and   Recommendations(SBAR). Information from the following report(s) SBAR, OR Summary, Intake/Output, MAR, Recent Results, and Cardiac Rhythm SR  was reviewed with the receiving nurse. Per anesthesia on admission patient intubation Normal    Collaborative team agrees of surgeon, anesthesia, RT, and RN agrees to proceed with Intubation until order obtained        Opportunity for questions and clarification was provided. Assessment completed upon patients arrival to unit and care assumed.      Received to [address] [place] [address]

## 2023-03-17 NOTE — ANESTHESIA PROCEDURE NOTES
Central Venous Line:    A central venous line was placed using ultrasound guidance, in the OR for the following indication(s): central venous access.3/17/2023 2:18 PM3/17/2023 2:31 PM    Sterility preparation included the following: hand hygiene performed prior to procedure, maximum sterile barriers used and sterile technique used to drape from head to toe.    The patient was placed in supine position.The right internal jugular vein was prepped.    The site was prepped with Chloraprep.  A 9 Fr (size), introducer     During the procedure, the following specific steps were taken: target vein identified and guidewire advanced into vein.    Intravenous verification was obtained by ultrasound, venous blood return and manometry.    Post insertion care included: all ports aspirated, all ports flushed easily, guidewire removed intact, line sutured in place and dressing applied.    During the procedure the patient experienced: patient tolerated procedure well with no complications.      Outcomes: uncomplicated  Real-time US image taken/store: yes  Anesthesia type: general..No    Additional notes:  Potential access sites were examined with ultrasound and the acceptable patent access site was selected (site noted above).  The needle path and vein access were visualized in real time using ultrasonography, and an image was recorded for permanent record.     Triple lumen removed and introducer placed over wire under sterile conditions.  [place] floated to 55 cm with good wave form.  No adverse sequelae.  Staffing  Performed: Anesthesiologist   Anesthesiologist: Erica Gonzales MD  Preanesthetic Checklist  Completed: patient identified, IV checked, site marked, risks and benefits discussed, surgical/procedural consents, equipment checked, pre-op evaluation, timeout performed, anesthesia consent given, oxygen available and monitors applied/VS acknowledged

## 2023-03-17 NOTE — CARE COORDINATION
Chart reviewed and pt discussed in am IDR. Pt tx to ICU s/p cardiac arrest and now going for emergent CABG impella placement by Dr. Octavio Ferrara. Intubated/vent -100% fio2. Amio, fentanyl, heparin, versed gtts currently per [facility]  CM will continue to follow for any assist and d/c needs/POC when stable per MD.

## 2023-03-17 NOTE — PROGRESS NOTES
Spoke with Dr. Campbell Holland is for patient to now go to OR for CABG with Impella support. Wife and family updated with plans of surgery today. Spoke with Dr. Marcelina North regarding surgery and medications to be administered prior to surgery. Orders received to only administer SSI and protonix prior to surgery. Will check fsbs and administer SQ Humalog only. Will hold off on administering SQ lantus at this time with plans to start insulin gtt in OR per Dr. Marcelina North.

## 2023-03-17 NOTE — PROGRESS NOTES
Called to room by patient's nurse, patient was having frequent runs of nonsustained V. tach. Shortly after this he was in persistent V. tach though patient still had a pulse and was alert and responding. Amiodarone 150 mg IV bolus was initiated. Shortly after this, patient went into V-fib and became unresponsive. CPR was initiated and he was shocked once and given epinephrine 1 mg and calcium gluconate. ABG was done showing a compensated metabolic acidosis with potassium 5.8, 7.34/27.6/76/14.9 on 100% O2. Insulin and D50 were given. Shortly after this patient had another run of V-fib necessitating chest compressions and he was shocked again with return of spontaneous circulation. Lidocaine 100 mg and amiodarone 150 mg second bolus was given. Had long discussion with wife, she was brought back into the room.

## 2023-03-17 NOTE — PROGRESS NOTES
TRANSFER - OUT REPORT:    Verbal report given to Frank Byrd on Geoffrey   being transferred to CVICU s/p CABG for routine post-op       Report consisted of patient's Situation, Background, Assessment and   Recommendations(SBAR). Information from the following report(s) Nurse Handoff Report, Index, Intake/Output, MAR, Recent Results, Cardiac Rhythm NSR PVCs, and Alarm Parameters was reviewed with the receiving nurse. Exchange Assessment: No data recorded  Lines:   CVC Triple Lumen 03/16/23 Left Internal jugular (Active)   $ Central line insertion $ Yes 03/16/23 Lj Dixons Being Utilized Yes 03/17/23 0715   Criteria for Appropriate Use Hemodynamically unstable, requiring monitoring lines, vasopressors, or volume resuscitation 03/17/23 0715   Site Assessment Clean, dry & intact 03/17/23 0715   Phlebitis Assessment No symptoms 03/17/23 0715   Infiltration Assessment 0 03/17/23 0715   Color/Movement/Sensation Capillary refill less than 3 sec 03/17/23 0715   Proximal Lumen Color/Status White; Infusing 03/17/23 0715   Medial Lumen Status Blue; Infusing 03/17/23 0715   Distal Lumen Color/Status Brown;Transduced 03/17/23 0715   Line Care Cap changed; Connections checked and tightened;Tubing changed 03/17/23 0715   Alcohol Cap Used Yes 03/17/23 0715   Date of Last Dressing Change 03/16/23 03/16/23 1907   Dressing Type Transparent w/CHG gel 03/17/23 0715   Dressing Status Clean, dry & intact 03/17/23 0715       Peripheral IV 03/15/23 Right Antecubital (Active)   Site Assessment Clean, dry & intact 03/17/23 0715   Line Status Infusing 03/17/23 0715   Line Care Connections checked and tightened 03/17/23 0715   Phlebitis Assessment No symptoms 03/17/23 0715   Infiltration Assessment 0 03/17/23 0715   Alcohol Cap Used Yes 03/17/23 0715   Dressing Status Clean, dry & intact 03/17/23 0715   Dressing Type Transparent 03/17/23 0715       Peripheral IV 03/15/23 Left Antecubital (Active)   Site Assessment Clean, dry & intact 03/17/23 0715   Line Status Flushed;Capped 03/17/23 0715   Line Care Cap changed 03/17/23 0715   Phlebitis Assessment No symptoms 03/17/23 0715   Infiltration Assessment 0 03/17/23 0715   Alcohol Cap Used Yes 03/17/23 0715   Dressing Status Clean, dry & intact 03/17/23 0715   Dressing Type Transparent 03/17/23 0715        Opportunity for questions and clarification was provided.       Patient transported with:  Monitor

## 2023-03-17 NOTE — PROGRESS NOTES
Responded to the Code Blue called at room [phone] as the on-call .  Andrew Seay attended patient's earlier code blue, less than 24 hours ago. Dr. Gunjan Kellogg is planning to visit patient this morning to provide guidance with a plan/next steps. Dr. Gunjan Kellogg was scheduled to perform a triple bypass surgery for patient prior to the code blue events. Upon my arrival Mr. Bunn's wife, Diana Lobo was at bedside. Diana Lobo informed me that she came to the hospital after Mr. Bunn's initial code blue and remained at the hospital overnight. Diana Lobo was very receptive to the visit, engaging in conversation. The couple has been  32 years and celebrated an anniversary about a month ago. Also, the couple has a 30 y/o daughter, Lester November, that was en route from [facility] during my visit. I offered several spiritual interventions including empathic listening, affirmation of emotions and angel, exploration of coping mechanisms & support system, and prayer. The couple has no local angel community supporting them during this time. Mr. Minerva Ulrich, preferred name Monique Suero, identifies as Mosque and Diana Lobo is [facility]. I encouraged Diana Chi that chaplains will continue to follow them, offering spiritual support. I introduced the day shift  covering CCU, Chaplain Geovani Ledbetter, upon his arrival to patient's room and transitioned from the visit. Prior to my departure, Diana Yamil expressed her appreciation for the prayer/support. Chaplain Zaidi remained with Hannah to continue support.        Billy Bello 70  Board Certified

## 2023-03-17 NOTE — BRIEF OP NOTE
Procedure: Surgical access with direct aortic graft placement tunneled to right subclavian area. Placement of 5.5 L surgical Impella. Details: Patient is status post coronary bypass grafting. Hemodynamics are borderline and patient with multiple cardiac arrest due to polymorphic VT. Given patient's complex CAD and ventricular arrhythmias with acute kidney injury we proceeded with postoperative placement of Impella placement. Surgical access was direct aortic. Dr. Harshad Salinas sewn graft to anterior aorta and tunneled out right subclavian. Utilizing fluoroscopy in the operating room atypical J-wire and angled pigtail were utilized to cross the aortic valve. This was exchanged for the Impella wire. The 5.5 L Impella was then directly placed under fluoroscopic guidance into the LV and confirmed placement by transesophageal echocardiogram.  The patient was successfully weaned off coronary artery bypass with successful transition to Impella LVAD assistance. Patient was then transferred to CVICU in stable condition. Conclusions: Successful surgical placement of 5.5 L Impella via direct aortic access tunneled to right subclavicular area.     Marti Burnette MD

## 2023-03-17 NOTE — MANAGEMENT PLAN
Called by ICU coordinator regarding increased runs of NSVT. Advised at that time that IV amiodarone drip was not started earlier in the shift after initial CODE BLUE due to bradycardia with rates at in the 40s around 9pm. Advised RN to give 150mg IV amiodarone bolus and start amiodarone drip. Shortly after that conversation, patient developed sustained VTACH then VFIB. CPR was started and patient was shocked at 200J. 1 amp of epi was given with ROSC. Patient continued to have increased ectopy and an additional 150mg IV amiodarone was given. Updated patient's wife updated in the unit. Patient schedule to have surgery today. Dr. Hannah Rivera updated and will see the patient this morning.      Sylvia Marcos, APRN - CNP  5:51 AM

## 2023-03-17 NOTE — PROGRESS NOTES
Patient noted to have frequent runs of vtach. Dr. Maryann Mcconnell called to bedside, Raad Ochoa, BREONNA notified. Shortly after notification, sustained vtach followed, later converting to vfib. CODE BLUE initiated with both providers at bedside. See code documentation for details.

## 2023-03-17 NOTE — ANESTHESIA POSTPROCEDURE EVALUATION
Department of Anesthesiology  Postprocedure Note    Patient: Scott Rodas  MRN: [id number]  YOB: 1955  Date of evaluation: 3/17/2023      Procedure Summary     Date: 03/17/23 Room / Location: [facility] MA[facility] OR 36 CARDIAC / [facility] MAIN OR    Anesthesia Start: 0829 Anesthesia Stop: 9793    Procedures:       CABG CORONARY ARTERY BYPASS ([place]), DESHPANDE; VEIN HARVEST ENDOSCOPIC, GREATER SAPHENOUS VEIN LEFT; LEFT ATRIAL APPENDAGE CLIPPING; IMPELLA INSERTION (Chest)      TRANSESOPHAGEAL ECHOCARDIOGRAM (Esophagus)      Ventricular assist device (VAD) insertion (Chest) Diagnosis:       Coronary artery disease involving native heart, unspecified vessel or lesion type, unspecified whether angina present      (Coronary artery disease involving native heart, unspecified vessel or lesion type, unspecified whether angina present [I25.10])    Providers: Anupama Brothers MD; Marlene Hernandez MD Responsible Provider: Karina Ramos MD    Anesthesia Type: general ASA Status: 4          Anesthesia Type: No value filed. Jane Phase I: Jane Score: 10    Jane Phase II:        Anesthesia Post Evaluation    Patient location during evaluation: ICU  Patient participation: complete - patient cannot participate  Level of consciousness: sedated and ventilated  Pain score: 0  Airway patency: patent  Nausea & Vomiting: no nausea  Complications: no  Cardiovascular status: hemodynamically stable and blood pressure returned to baseline  Respiratory status: acceptable and ventilator  Hydration status: euvolemic  Comments: Present for transport. VSS. Report to RN. TEG ordered by Dr Bard Nolan pending. No problems with bleeding at this time.   BS equal and bilateral.

## 2023-03-17 NOTE — PROGRESS NOTES
Was notified by Veronica Gould RN that a code blue was called due to pt being in V-Fib on monitor. Primary RN rushed to room #308 to observe another RN performing CPR on pt. Code team arrived as well as Dr. Luana Gar. Primary RN remained at bedside during code for questions.

## 2023-03-17 NOTE — PROGRESS NOTES
Upon arrival to the hospital ,  contacted by Sean Muir sharing that this patient had experienced a Code blue. Eden Jack introduced me to the patient's wife, Pipe Clement,  and told me about their daughter,Henry on her way to the hospital. I provided active listening and was able to help her understand some things about the heart by-pass process because I had open-heart surgery last year. Pipe Suttono and Gabriel Rubio were calm and seemed to be handling the crisis reasonably well. The patient appears to be sedated and stable. His family is awaiting a visit from Dr. Prosper Dior to learn what the next steps of care may be. Brock Hook will continue to check in on the family and provide spiritual support.        Lenard Reilly, [address] [facility], [facility]

## 2023-03-17 NOTE — PROGRESS NOTES
Pt seen in CVICU post of CABG x 5. Currently on 2mcg/kg/min dopamine. FiO2 80% satting 100%. Impella in place. Abg at goal. CXR with devices in satisfactory position. Plan is to leave sedated and intubated overnight. Will assess hemodynamic stability tomorrow and potentially work towards extubation.            Ant Charlton MD

## 2023-03-17 NOTE — PROGRESS NOTES
Ventilator check complete; patient has a #7.5 ET tube secured at the 26 at the teeth. Patient is  sedated. Patient is not able to follow commands. Breath sounds are coarse and diminished. Trachea is midline, Negative for subcutaneous air, and chest excursion is symmetric. Patient is also Negative for cyanosis and is Negative for pitting edema. All alarms are set and audible. Resuscitation bag is  at the head of the bed.       Ventilator Settings  Mode FIO2 Rate Tidal Volume  PEEP I:E Ratio   AC/PRVC  100 % 18 550 mL  14 cm H20 1:1.7      Peak airway pressure: 27 cm H20  Minute ventilation: 11.6 L      Carron Fill, RCP

## 2023-03-17 NOTE — PROGRESS NOTES
CTS- more V tach this am, cardiology feels it is ischemic, for high risk CABG/Impella this morning, EVER his wife

## 2023-03-17 NOTE — PERIOP NOTE
Wife updated @5395 by Lillie Holcomb RN. Four digit code obtained. Wife updated @9624 by Lillie Holcomb RN. Four digit code obtained. Wife updated @8722 by Lillie Holcomb RN. Four digit code obtained.

## 2023-03-17 NOTE — PROGRESS NOTES
TRANSFER - OUT REPORT:    Verbal report given to Jeff Soto on Jasmyn Acharya being transferred to [facility] [phone] for urgent transfer s.p code blue event. Report consisted of patients Situation, Background, Assessment and Recommendations (SBAR). Information from the following report(s) SBAR and MAR, cardiac rhythm was reviewed with the receiving nurse. Opportunity for questions and clarification was provided. Family was called; wife and children on the way.

## 2023-03-17 NOTE — DIABETES MGMT
Patient admitted with MI. Noted patient s/p code blue yesterday and was transferred to CCU. Patient planned for CABG. Blood glucose ranged 166-377 yesterday with patient receiving Lantus 5 units and Humalog 10 units. Blood glucose this morning was 422. Patient received IV Regular 10 units and D10 50mL. Per chart review patient potassium was 5.2 this AM. Creatinine 2.40. GFR 29. Noted most recent FSBS 507 and patient to receive Humalog 20 units. Spoke with primary RN as patient would likely benefit from insulin gtt. Per primary RN gtt was discussed and anesthesiologist plans to start insulin gtt when patient transfers down to OR. Will follow along loosely.

## 2023-03-17 NOTE — PROGRESS NOTES
Spiritual Care Visit, Salvador 26 visit. Visited with patient's wife and children in Surgery Waiting Room while they were waiting to see him following surgery. Prayed for patient's healing and health, and for peace and comfort for family. Visit by Elizabeth Ramos, Staff .  Talita., Th.B., B.A.

## 2023-03-17 NOTE — CONSULTS
PULMONARY/CRITICAL CARE CONSULT NOTE           3/17/2023    Alicia Arroyo                        Date of Admission:  3/15/2023    The patient's chart is reviewed and the patient is discussed with the staff. Patient is a 74 y.o.  male seen and evaluated at the request of Dr. Nick Hughes for Code Blue. He has a h/o CAD with inferior ST elevations, multivessel coronary disease with PCI of 3 obtuse marginals in 2005, Mobitz type I second-degree AV block, diabetes mellitus, hyperlipidemia who was admitted to [address] [place] [address] on 3/15/2023 with chest pain. He underwent cardiac catheterization on 315 which demonstrated severe multivessel coronary disease, inferior infarction due to occlusion of the mid left circumflex artery with an aneurysmal inferior wall. EF was 40 to 45% and the LVEDP was 19. Today he had a CODE BLUE arrest at about 5 PM for R on T provoking ventricular tachycardia / torsades pattern. He was quickly defibrillated at 200J which did not lead to palpable pulses and asystole appeared to be present on the bedside monitor. CPR was  initiated and 1 mg of epinephrine was given in addition to an amp of bicarbonate. Magnesium 2 mg was given initially. 300 mg bolus of amiodarone was pushed and pulse was regained within 5 minutes. Initial neurologic status was nonfocal and minimally responsive,breathing was apneic with coarse breath sounds on the left. Intubation was performed and the patient was transferred to the CCU. Neurologic status improved over the following 30 minutes during this transfer and purposeful movements were noted, patient clearly awake. Dr. Adriana Stover was present throughout the acute management during and immediately post-arrest.  We discussed hypothermia and felt his neurologic responses and extremely brief time to ROSC obviated need for this. In the CCU, sedation was started for central line placement and amiodarone infusion was ongoing.   Several brief runs of ventricular tachycardia occurred and lidocaine 50mg was given. Pulses were maintained throughout. CVC was placed in Left IJ and patient was sedated with fentanyl and propofol. Amiodarone dose was reduced after persistent bradycardia developed. Subjective:     2  more arrests this morning with CPR with ROSC, now going for emergent CABG impella placement planned. Review of Systems: Unable to obtain due to patient factors. Current Outpatient Medications   Medication Instructions    allopurinol (ZYLOPRIM) 100 mg, Oral, DAILY    aspirin 81 mg, Oral, DAILY    atorvastatin (LIPITOR) 80 mg, Oral, DAILY    cyanocobalamin 1,000 mcg, DAILY    dapagliflozin (FARXIGA) 10 mg, DAILY    fenofibrate (TRIGLIDE) 160 mg, Oral, DAILY    insulin aspart (NOVOLOG) 100 UNIT/ML injection vial SubCUTAneous, PRN, Patient take 1:9 carb ratio plus 3 units/50 > 150    Insulin Degludec 92 Units, SubCUTAneous, NIGHTLY    levothyroxine (SYNTHROID) 175 mcg, Oral, DAILY BEFORE BREAKFAST    lisinopril (PRINIVIL;ZESTRIL) 20 MG tablet Take one tab daily. metFORMIN (GLUCOPHAGE) 1,000 mg, Oral, 2 TIMES DAILY WITH MEALS    nitroGLYCERIN (NITROSTAT) 0.4 MG SL tablet Use as directed for chest pain    omeprazole (PRILOSEC) 20 mg, DAILY    Ozempic (1 MG/DOSE) 1 mg, SubCUTAneous, EVERY 7 DAYS    triamterene-hydroCHLOROthiazide (MAXZIDE-25) 37.5-25 MG per tablet TAKE ONE TABLET BY MOUTH ONE TIME DAILY      Past Medical History:   Diagnosis Date    Diabetes ([facility] [address]     GERD (gastroesophageal reflux disease)     Gout     Hypercholesterolemia     Thyroid disease      Past Surgical History:   Procedure Laterality Date    CARDIAC PROCEDURE N/A 3/15/2023    LEFT HEART CATH / CORONARY ANGIOGRAPHY performed by Dona Bonilla MD at [place] [address]  2005    M. I.      Social History     Socioeconomic History    Marital status:      Spouse name: Not on file    Number of children:

## 2023-03-17 NOTE — BRIEF OP NOTE
Brief Postoperative Note      Patient: Regan Parekh  YOB: 1955  MRN: [id number]    Date of Procedure: 3/17/2023    Pre-Op Diagnosis: Coronary artery disease involving native heart, unspecified vessel or lesion type, unspecified whether angina present [I25.10]    Post-Op Diagnosis: Same       Procedure(s):  CABG CORONARY ARTERY BYPASS (Valerie Martinez), DESHPANDE; VEIN HARVEST ENDOSCOPIC, GREATER SAPHENOUS VEIN LEFT; LEFT ATRIAL APPENDAGE CLIPPING; IMPELLA INSERTION  TRANSESOPHAGEAL ECHOCARDIOGRAM  Ventricular assist device (VAD) insertion    Surgeon(s):  MD Layla Aguilera MD    Assistant:  * No surgical staff found *    Anesthesia: General    Estimated Blood Loss (mL): Minimal    Complications: None    Specimens:   * No specimens in log *    Implants:  Implant Name Type Inv.  Item Serial No.  Lot No. LRB No. Used Action   GRAFT VASC L300MM OD10MM UNIV STR STD WALL Dorann Natter NRINGED - [id number] Vascular grafts GRAFT VASC L300MM OD10MM UNIV STR STD WALL N TAPR NRINGED  TERUMO CARDIOVASCULAR-WD [id number] N/A 1 Implanted   CLIP MED SUTURE LESS 40 MM SYS 1 HND STRL ATRICLIP FLX V - [id number]  CLIP MED SUTURE LESS 40 MM SYS 1 HND STRL ATRICLIP FLX V  ATRICURE INC-WD [id number] N/A 1 Implanted         Drains:   NG/OG/NJ/NE Tube Orogastric 16 fr Center mouth (Active)   Surrounding Skin Clean, dry & intact 03/17/23 0715   Securement device Bridle 03/17/23 0715   Status Suction-low intermittent 03/17/23 0715   Placement Verified External Catheter Length;Gastric Contents 03/17/23 0715   NG/OG/NJ/NE External Measurement (cm) 72 cm 03/17/23 0715   Drainage Appearance Clear;Brown 03/17/23 0715   Output (mL) 350 ml 03/17/23 0611       Urinary Catheter 03/16/23 Baeza (Active)   $ Urethral catheter insertion $ Not inserted for procedure 03/16/23 2342   Catheter Indications Need for fluid volume management of the critically ill patient in a critical care setting 03/17/23 0715   Site Assessment No urethral drainage 03/17/23 0715   Urine Color Yellow 03/17/23 0715   Urine Appearance Sediment 03/17/23 0715   Collection Container Standard 03/16/23 2342   Securement Method Securing device (Describe) 03/17/23 0715   Catheter Care  Perineal wipes 03/17/23 0715   Catheter Best Practices  Drainage tube clipped to bed;Catheter secured to thigh; Tamper seal intact; Bag below bladder;Bag not on floor; Lack of dependent loop in tubing;Drainage bag less than half full 03/17/23 0715   Status Draining;Patent 03/17/23 0715   Output (mL) 250 mL 03/17/23 2906       Findings: cad    Electronically signed by Christina Browne MD on 3/17/2023 at 2:53 PM

## 2023-03-17 NOTE — ANESTHESIA PROCEDURE NOTES
Procedure Performed: MONICA       Start Time:  3/17/2023 8:51 AM       End Time:   3/17/2023 9:12 AM    Preanesthesia Checklist:  Patient identified, IV assessed, risks and benefits discussed, monitors and equipment assessed, procedure being performed at surgeon's request and anesthesia consent obtained. General Procedure Information  Diagnostic Indications for Echo:  assessment of ascending aorta and assessment of surgical repair  Physician Requesting Echo: Pedro Scruggs MD  Location performed:  OR  Intubated  Bite block placed  Heart visualized  Probe Insertion:  Easy  Probe Type:  3D and mulitplane  Modalities:  M-mode, color flow mapping and 2D    Echocardiographic and Doppler Measurements    Ventricles    Right Ventricle:  Cavity size normal.  Hypertrophy not present. Thrombus not present. Global function normal.    Left Ventricle:  Cavity size normal.  Hypertrophy not present. Thrombus not present. Global Function mildly impaired. Other Ventricular Findings:       FS 37    Ventricular Regional Function:  3- Basal Anterolateral:  hypokinetic  4- Basal Inferolateral:  hypokinetic  9- Mid Anterolateral:  hypokinetic  10- Mid Inferolateral:  hypokinetic    Wall Motion Comments:       No aneurysmal component noted. Valves    Aortic Valve: Annulus normal.  Stenosis not present. Regurgitation none. Leaflets normal.  Leaflet motions normal.      Mitral Valve: Annulus normal.  Stenosis not present. Regurgitation none. Leaflets normal.  Leaflet motions normal.      Tricuspid Valve: Annulus normal.        Aorta    Ascending Aorta:  Diameter 4 cm. Dissection not present. Descending Aorta:  Size normal.  Dissection not present. Atria    Right Atrium:  Size normal.  Spontaneous echo contrast not present. Thrombus not present. Tumor not present. Device present. Left Atrium:  Size normal.  Spontaneous echo contrast not present. Thrombus not present. Tumor not present.   Device not present.     Left atrial appendage normal.      Septa    Atrial Septum:  Intra-atrial septal morphology normal.      Ventricular Septum:  Intra-ventricular septum morphology normal.              Anesthesia Information  Performed Personally      Echocardiogram Comments:       Impella placement 3.5 cm into LVOT on last measurement prior to transport to ICU after chest closedPost Intervention Follow-up Study  Aortic Function: unchangedMitral Function: unchangedNone

## 2023-03-17 NOTE — PROGRESS NOTES
03/17/23 1457   Patient Observation   Heart Rate 62   Resp 21   SpO2 90 %   Vent Information   Ventilator ID [id number]  (pre use done)   Vent Mode AC/PRVC   Ventilator Settings   FiO2  100 %   Insp Time (sec) 0.9 sec   Resp Rate (Set) 18 bmp   Target Vt 550   PEEP/CPAP (cmH2O) 10   Pressure Support (cm H2O) 0 cm H2O   Pressure Ordered 0   Vent Patient Data (Readings)   Vt Mandatory Exp (mL) 0 mL   Vt (Measured) 550 mL   Peak Inspiratory Pressure (cmH2O) 18 cmH2O   Rate Measured 18 br/min   Minute Volume (L/min) 10.1 Liters   Mean Airway Pressure (cmH2O) 11 cmH20   Flow Sensitivity 2 L/min   Insp Rise Time (%) 70 %   Vent Alarm Settings   Low Pressure (cmH2O) 11.5 cmH2O   High Pressure (cmH2O) 50 cmH2O   Low Minute Volume (lpm) 2 L/min   High Minute Volume (lpm) 22 L/min   RR High (bpm) 50 br/min   Additional Respiratoray Assessments   Humidification Source HME   Ambu Bag With Mask At Bedside Yes   ETT    Placement Date/Time: 03/16/23 1645   Present on Admission/Arrival: No  Placed By: Licensed provider  Placement Verified By: Dahlia Gregory; Chest X-ray;Colorimetric ETCO2 device;Direct visualization  Preoxygenation: Yes  Technique: Direct laryngoscopy  Air. ..    Secured At 26 cm   Measured From Teeth   ETT Placement Right   Secured By Commercial tube sandoval   Cuff Pressure 26 cm H2O   Respiratory   Respiratory (WDL) X

## 2023-03-17 NOTE — PROGRESS NOTES
[facility] CARDIOLOGY PROGRESS NOTE           3/17/2023 1:21 PM    Admit Date: 3/15/2023    Subjective:   Patient is intubated and sedated. He is status post polymorphic VT arrest x2. Patient has been hemodynamically stable. After long discussion with family and Dr. Magdalena Madden with cardiothoracic surgery we have deemed patient stable enough to proceed with coronary bypass grafting surgery. ROS:  UNABLE TO OBTAIN DUE TO INABILITY OF PATIENT TO COMMUNICATE SECONDARY TO CURRENT INTUBATION AND NEED FOR MECHANICAL VENTILATION. Objective:      Vitals:    03/17/23 0815 03/17/23 0820 03/17/23 0824 03/17/23 0825   BP:       Pulse: 79 78 77 76   Resp: 18 18 18 18   Temp:       TempSrc:       SpO2: 96% 96% 96% 96%   Weight:       Height:           Physical Exam:  General-Intubated. Neck- supple, no JVD  CV- regular rate and rhythm no MRG  Lung- clear bilaterally  Abd- soft, nontender, nondistended  Ext- no edema bilaterally. Skin- warm and dry  Psychiatric:  Unable to accurately assess due to intubated and sedated status. Neurologic:  Unable to accurately assess due to intubated and sedated status. Data Review:   Recent Labs     03/16/23  0858 03/16/23  1508 03/16/23 2038 03/16/23 2039 03/17/23  0355      < > 134  --  137   K 4.0   < > 5.1  --  5.2*   MG 2.2  --  3.1*  --  2.8*   BUN 25*   < > 31*  --  36*   WBC 6.3   < >  --  7.9 5.4   HGB 11.3*   < >  --  9.8* 9.1*   HCT 35.1*   < >  --  30.2* 27.5*      < >  --  239 168   INR 1.0  --   --   --   --    CHOL 140  --   --   --   --    HDL 35*  --   --   --   --     < > = values in this interval not displayed. TELEMETRY:  SR    Assessment/Plan:     Principal Problem:    NSTEMI (non-ST elevated myocardial infarction) ([facility] [address]  Plan: Patient with late presentation myocardial infarction involving distal left circumflex.   Patient now with recurrent polymorphic VT requiring ACLS resuscitation and associated with hemodynamic collapse and now acute tubular necrosis. Hemodynamics have improved overnight. Review of films with cardiothoracic surgery was completed and patient deemed most appropriate to proceed with coronary artery bypass grafting. Long discussion regarding hemodynamics and probable need for Impella pre-/intra or postoperatively. Active Problems:    History of coronary artery stent placement  Plan: Patient with complex coronary artery disease with overall poor stent performance. Cardiac arrest [facility])  Plan: Secondary to polymorphic VT likely related to ongoing ischemia. Acute respiratory failure with hypoxia (HCC)  Plan: Currently intubated and sedated    Ventricular arrhythmia  Plan: Patient is stable on IV amiodarone    Acute myocardial infarction, subendocardial infarction, initial episode of care [facility])  Plan: Delayed presentation inferolateral myocardial infarction with plans for coronary bypass grafting today. Diabetes mellitus type 1.5 (HCC)  Plan: Managed per intensivist.    Essential hypertension  Plan: Currently hypotensive. Will address as patient recovers    Hyperlipidemia  Plan: Continue high intensity statin therapy.           Aj Chen MD  3/17/2023 1:21 PM

## 2023-03-17 NOTE — ADDENDUM NOTE
Addendum  created 03/17/23 1559 by Leonidas Steward MD    Child order released for a procedure order, Clinical Note Signed, Intraprocedure Blocks edited, LDA created via procedure documentation, LDA updated via procedure documentation, SmartForm saved

## 2023-03-17 NOTE — PERIOP NOTE
TRANSFER - OUT REPORT:    Verbal report given to Naeem Pro on [facility]  being transferred to CVICU for routine progression of patient care       Report consisted of patient's Situation, Background, Assessment and   Recommendations(SBAR). Information from the following report(s) Surgery Report was reviewed with the receiving nurse. [place] Assessment: No data recorded  Lines:   CVC Triple Lumen 03/16/23 Left Internal jugular (Active)   $ Central line insertion $ Yes 03/16/23 Freddy Crumbly Being Utilized Yes 03/17/23 0715   Criteria for Appropriate Use Hemodynamically unstable, requiring monitoring lines, vasopressors, or volume resuscitation 03/17/23 0715   Site Assessment Clean, dry & intact 03/17/23 0715   Phlebitis Assessment No symptoms 03/17/23 0715   Infiltration Assessment 0 03/17/23 0715   Color/Movement/Sensation Capillary refill less than 3 sec 03/17/23 0715   Proximal Lumen Color/Status White; Infusing 03/17/23 0715   Medial Lumen Status Blue; Infusing 03/17/23 0715   Distal Lumen Color/Status Brown;Transduced 03/17/23 0715   Line Care Cap changed; Connections checked and tightened;Tubing changed 03/17/23 0715   Alcohol Cap Used Yes 03/17/23 0715   Date of Last Dressing Change 03/16/23 03/16/23 1907   Dressing Type Transparent w/CHG gel 03/17/23 0715   Dressing Status Clean, dry & intact 03/17/23 0715       Peripheral IV 03/15/23 Right Antecubital (Active)   Site Assessment Clean, dry & intact 03/17/23 0715   Line Status Infusing 03/17/23 0715   Line Care Connections checked and tightened 03/17/23 0715   Phlebitis Assessment No symptoms 03/17/23 0715   Infiltration Assessment 0 03/17/23 0715   Alcohol Cap Used Yes 03/17/23 0715   Dressing Status Clean, dry & intact 03/17/23 0715   Dressing Type Transparent 03/17/23 0715       Peripheral IV 03/15/23 Left Antecubital (Active)   Site Assessment Clean, dry & intact 03/17/23 0715   Line Status Flushed;Capped 03/17/23 0715   Line Care Cap changed 03/17/23 0715   Phlebitis Assessment No symptoms 03/17/23 0715   Infiltration Assessment 0 03/17/23 0715   Alcohol Cap Used Yes 03/17/23 0715   Dressing Status Clean, dry & intact 03/17/23 0715   Dressing Type Transparent 03/17/23 0715        Opportunity for questions and clarification was provided.       Patient transported with:  Monitor, O2 @ 15 lpm, and Registered Nurse  CRNA  MDA

## 2023-03-17 NOTE — PROCEDURES
PROCEDURE NOTE  ARTERIAL LINE PLACEMENT  03/16/23  10:39 PM    Indication: shock in need of hemodynamic monitoring    A time-out was completed verifying correct patient, procedure, site, positioning, and special equipment if applicable. Kevons test was performed to ensure adequate perfusion. The patients left wrist was prepped and draped in sterile fashion. 1% Lidocaine was used to anesthetize the area. A 20G Arrow arterial line was introduced into the left radial artery. The catheter was threaded over the guide wire and the needle was removed with appropriate pulsatile blood return. The catheter was then sutured in place to the skin and a sterile dressing applied. Perfusion to the extremity distal to the point of catheter insertion was checked and found to be adequate. The patient tolerated the procedure well and there were no complications.     Belen Tinajero MD

## 2023-03-17 NOTE — PROGRESS NOTES
Cardiology NP notified of patient's HR sustained in low 40s, BP 60s/40s. Also notified of EKG results showing second degree Mobitz I block. Currently on 30 mcg/min of norepinephrine. New orders received for dobutamine gtt.

## 2023-03-17 NOTE — PROGRESS NOTES
Patient having couplet PVCs on monitor with decreased perfusion noted on a-line waveform. Renuka Agrawal NP notified. New orders received to administer 1/2 amp lidocaine.

## 2023-03-18 ENCOUNTER — APPOINTMENT (OUTPATIENT)
Dept: GENERAL RADIOLOGY | Age: 68
DRG: 215 | End: 2023-03-18
Payer: COMMERCIAL

## 2023-03-18 LAB
ANION GAP SERPL CALC-SCNC: 4 MMOL/L (ref 2–11)
APPEARANCE UR: CLEAR
ARTERIAL PATENCY WRIST A: ABNORMAL
ARTERIAL PATENCY WRIST A: ABNORMAL
ARTERIAL PATENCY WRIST A: NORMAL
BACTERIA URNS QL MICRO: 0 /HPF
BASE DEFICIT BLD-SCNC: 0.3 MMOL/L
BASE DEFICIT BLDV-SCNC: 0.6 MMOL/L
BASE DEFICIT BLDV-SCNC: 1.3 MMOL/L
BASOPHILS # BLD: 0 K/UL (ref 0–0.2)
BASOPHILS NFR BLD: 0 % (ref 0–2)
BDY SITE: ABNORMAL
BDY SITE: ABNORMAL
BDY SITE: NORMAL
BILIRUB UR QL: NEGATIVE
BUN SERPL-MCNC: 40 MG/DL (ref 8–23)
CALCIUM SERPL-MCNC: 8 MG/DL (ref 8.3–10.4)
CASTS URNS QL MICRO: 0 /LPF
CHLORIDE SERPL-SCNC: 114 MMOL/L (ref 101–110)
CO2 SERPL-SCNC: 25 MMOL/L (ref 21–32)
COLOR UR: ABNORMAL
CREAT SERPL-MCNC: 3.5 MG/DL (ref 0.8–1.5)
CRYSTALS URNS QL MICRO: 0 /LPF
DIFFERENTIAL METHOD BLD: ABNORMAL
EKG ATRIAL RATE: 100 BPM
EKG DIAGNOSIS: NORMAL
EKG P AXIS: 78 DEGREES
EKG P-R INTERVAL: 198 MS
EKG Q-T INTERVAL: 386 MS
EKG QRS DURATION: 90 MS
EKG QTC CALCULATION (BAZETT): 497 MS
EKG R AXIS: -18 DEGREES
EKG T AXIS: 84 DEGREES
EKG VENTRICULAR RATE: 100 BPM
EOSINOPHIL # BLD: 0 K/UL (ref 0–0.8)
EOSINOPHIL NFR BLD: 0 % (ref 0.5–7.8)
EPI CELLS #/AREA URNS HPF: 0 /HPF
ERYTHROCYTE [DISTWIDTH] IN BLOOD BY AUTOMATED COUNT: 14.9 % (ref 11.9–14.6)
ERYTHROCYTE [DISTWIDTH] IN BLOOD BY AUTOMATED COUNT: 15.2 % (ref 11.9–14.6)
GAS FLOW.O2 O2 DELIVERY SYS: ABNORMAL
GAS FLOW.O2 O2 DELIVERY SYS: ABNORMAL
GAS FLOW.O2 O2 DELIVERY SYS: NORMAL
GLUCOSE BLD STRIP.AUTO-MCNC: 100 MG/DL (ref 65–100)
GLUCOSE BLD STRIP.AUTO-MCNC: 101 MG/DL (ref 65–100)
GLUCOSE BLD STRIP.AUTO-MCNC: 102 MG/DL (ref 65–100)
GLUCOSE BLD STRIP.AUTO-MCNC: 104 MG/DL (ref 65–100)
GLUCOSE BLD STRIP.AUTO-MCNC: 105 MG/DL (ref 65–100)
GLUCOSE BLD STRIP.AUTO-MCNC: 108 MG/DL (ref 65–100)
GLUCOSE BLD STRIP.AUTO-MCNC: 112 MG/DL (ref 65–100)
GLUCOSE BLD STRIP.AUTO-MCNC: 113 MG/DL (ref 65–100)
GLUCOSE BLD STRIP.AUTO-MCNC: 115 MG/DL (ref 65–100)
GLUCOSE BLD STRIP.AUTO-MCNC: 118 MG/DL (ref 65–100)
GLUCOSE BLD STRIP.AUTO-MCNC: 125 MG/DL (ref 65–100)
GLUCOSE BLD STRIP.AUTO-MCNC: 82 MG/DL (ref 65–100)
GLUCOSE BLD STRIP.AUTO-MCNC: 92 MG/DL (ref 65–100)
GLUCOSE BLD STRIP.AUTO-MCNC: 95 MG/DL (ref 65–100)
GLUCOSE BLD STRIP.AUTO-MCNC: 98 MG/DL (ref 65–100)
GLUCOSE SERPL-MCNC: 99 MG/DL (ref 65–100)
GLUCOSE UR STRIP.AUTO-MCNC: NEGATIVE MG/DL
HCO3 BLD-SCNC: 24 MMOL/L (ref 22–26)
HCO3 BLDV-SCNC: 23.2 MMOL/L (ref 23–28)
HCO3 BLDV-SCNC: 24.5 MMOL/L (ref 23–28)
HCT VFR BLD AUTO: 27.8 % (ref 41.1–50.3)
HCT VFR BLD AUTO: 28.5 % (ref 41.1–50.3)
HGB BLD-MCNC: 9.2 G/DL (ref 13.6–17.2)
HGB BLD-MCNC: 9.3 G/DL (ref 13.6–17.2)
HGB UR QL STRIP: ABNORMAL
IMM GRANULOCYTES # BLD AUTO: 0 K/UL (ref 0–0.5)
IMM GRANULOCYTES NFR BLD AUTO: 0 % (ref 0–5)
KETONES UR QL STRIP.AUTO: ABNORMAL MG/DL
LACTATE SERPL-SCNC: 0.9 MMOL/L (ref 0.4–2)
LEUKOCYTE ESTERASE UR QL STRIP.AUTO: NEGATIVE
LYMPHOCYTES # BLD: 0.6 K/UL (ref 0.5–4.6)
LYMPHOCYTES NFR BLD: 10 % (ref 13–44)
MAGNESIUM SERPL-MCNC: 3.2 MG/DL (ref 1.8–2.4)
MAGNESIUM SERPL-MCNC: 3.5 MG/DL (ref 1.8–2.4)
MCH RBC QN AUTO: 28.8 PG (ref 26.1–32.9)
MCH RBC QN AUTO: 29.4 PG (ref 26.1–32.9)
MCHC RBC AUTO-ENTMCNC: 32.6 G/DL (ref 31.4–35)
MCHC RBC AUTO-ENTMCNC: 33.1 G/DL (ref 31.4–35)
MCV RBC AUTO: 88.2 FL (ref 82–102)
MCV RBC AUTO: 88.8 FL (ref 82–102)
MM INDURATION, POC: 0 MM (ref 0–5)
MONOCYTES # BLD: 0.8 K/UL (ref 0.1–1.3)
MONOCYTES NFR BLD: 12 % (ref 4–12)
MUCOUS THREADS URNS QL MICRO: 0 /LPF
NEUTS SEG # BLD: 4.8 K/UL (ref 1.7–8.2)
NEUTS SEG NFR BLD: 78 % (ref 43–78)
NITRITE UR QL STRIP.AUTO: NEGATIVE
NRBC # BLD: 0 K/UL (ref 0–0.2)
NRBC # BLD: 0 K/UL (ref 0–0.2)
O2/TOTAL GAS SETTING VFR VENT: 50 %
PCO2 BLD: 37 MMHG (ref 35–45)
PCO2 BLDV: 36.7 MMHG (ref 41–51)
PCO2 BLDV: 41.3 MMHG (ref 41–51)
PEEP RESPIRATORY: 50 CMH2O
PEEP RESPIRATORY: 8 CMH2O
PEEP RESPIRATORY: 8 CMH2O
PH BLD: 7.42 (ref 7.35–7.45)
PH BLDV: 7.38 (ref 7.32–7.42)
PH BLDV: 7.41 (ref 7.32–7.42)
PH UR STRIP: 5.5 (ref 5–9)
PLATELET # BLD AUTO: 141 K/UL (ref 150–450)
PLATELET # BLD AUTO: 141 K/UL (ref 150–450)
PMV BLD AUTO: 10.6 FL (ref 9.4–12.3)
PMV BLD AUTO: 10.8 FL (ref 9.4–12.3)
PO2 BLD: 144 MMHG (ref 75–100)
PO2 BLDV: 39 MMHG
PO2 BLDV: 53 MMHG
POTASSIUM SERPL-SCNC: 4.1 MMOL/L (ref 3.5–5.1)
POTASSIUM SERPL-SCNC: 4.2 MMOL/L (ref 3.5–5.1)
PPD, POC: NEGATIVE
PRESSURE SUPPORT SETTING VENT: 15 CMH2O
PROCALCITONIN SERPL-MCNC: 6.01 NG/ML (ref 0–0.49)
PROT UR STRIP-MCNC: ABNORMAL MG/DL
RBC # BLD AUTO: 3.13 M/UL (ref 4.23–5.6)
RBC # BLD AUTO: 3.23 M/UL (ref 4.23–5.6)
RBC #/AREA URNS HPF: ABNORMAL /HPF
RESPIRATORY RATE, POC: 16 (ref 5–40)
SAO2 % BLD: 99.3 % (ref 95–98)
SAO2 % BLDV: 73 % (ref 65–88)
SAO2 % BLDV: 87.7 % (ref 65–88)
SERVICE CMNT-IMP: ABNORMAL
SERVICE CMNT-IMP: NORMAL
SODIUM SERPL-SCNC: 143 MMOL/L (ref 133–143)
SP GR UR REFRACTOMETRY: 1.02 (ref 1–1.02)
SPECIMEN TYPE: ABNORMAL
SPECIMEN TYPE: ABNORMAL
SPECIMEN TYPE: NORMAL
URINE CULTURE IF INDICATED: ABNORMAL
UROBILINOGEN UR QL STRIP.AUTO: 0.2 EU/DL (ref 0.2–1)
VENTILATION MODE VENT: ABNORMAL
VENTILATION MODE VENT: ABNORMAL
VENTILATION MODE VENT: NORMAL
VT SETTING VENT: 550 ML
WBC # BLD AUTO: 6.2 K/UL (ref 4.3–11.1)
WBC # BLD AUTO: 6.6 K/UL (ref 4.3–11.1)
WBC URNS QL MICRO: ABNORMAL /HPF

## 2023-03-18 PROCEDURE — 82962 GLUCOSE BLOOD TEST: CPT

## 2023-03-18 PROCEDURE — 36415 COLL VENOUS BLD VENIPUNCTURE: CPT

## 2023-03-18 PROCEDURE — A4216 STERILE WATER/SALINE, 10 ML: HCPCS | Performed by: THORACIC SURGERY (CARDIOTHORACIC VASCULAR SURGERY)

## 2023-03-18 PROCEDURE — 6370000000 HC RX 637 (ALT 250 FOR IP): Performed by: INTERNAL MEDICINE

## 2023-03-18 PROCEDURE — 6370000000 HC RX 637 (ALT 250 FOR IP): Performed by: THORACIC SURGERY (CARDIOTHORACIC VASCULAR SURGERY)

## 2023-03-18 PROCEDURE — 84132 ASSAY OF SERUM POTASSIUM: CPT

## 2023-03-18 PROCEDURE — 80048 BASIC METABOLIC PNL TOTAL CA: CPT

## 2023-03-18 PROCEDURE — 6360000002 HC RX W HCPCS: Performed by: THORACIC SURGERY (CARDIOTHORACIC VASCULAR SURGERY)

## 2023-03-18 PROCEDURE — 6360000002 HC RX W HCPCS: Performed by: INTERNAL MEDICINE

## 2023-03-18 PROCEDURE — 2500000003 HC RX 250 WO HCPCS: Performed by: THORACIC SURGERY (CARDIOTHORACIC VASCULAR SURGERY)

## 2023-03-18 PROCEDURE — 71045 X-RAY EXAM CHEST 1 VIEW: CPT

## 2023-03-18 PROCEDURE — 81001 URINALYSIS AUTO W/SCOPE: CPT

## 2023-03-18 PROCEDURE — 99233 SBSQ HOSP IP/OBS HIGH 50: CPT | Performed by: INTERNAL MEDICINE

## 2023-03-18 PROCEDURE — 85025 COMPLETE CBC W/AUTO DIFF WBC: CPT

## 2023-03-18 PROCEDURE — 85027 COMPLETE CBC AUTOMATED: CPT

## 2023-03-18 PROCEDURE — 2580000003 HC RX 258: Performed by: THORACIC SURGERY (CARDIOTHORACIC VASCULAR SURGERY)

## 2023-03-18 PROCEDURE — 36592 COLLECT BLOOD FROM PICC: CPT

## 2023-03-18 PROCEDURE — 83605 ASSAY OF LACTIC ACID: CPT

## 2023-03-18 PROCEDURE — 37799 UNLISTED PX VASCULAR SURGERY: CPT

## 2023-03-18 PROCEDURE — 2100000000 HC CCU R&B

## 2023-03-18 PROCEDURE — 6370000000 HC RX 637 (ALT 250 FOR IP): Performed by: NURSE PRACTITIONER

## 2023-03-18 PROCEDURE — 87086 URINE CULTURE/COLONY COUNT: CPT

## 2023-03-18 PROCEDURE — 94003 VENT MGMT INPAT SUBQ DAY: CPT

## 2023-03-18 PROCEDURE — 89220 SPUTUM SPECIMEN COLLECTION: CPT

## 2023-03-18 PROCEDURE — 82803 BLOOD GASES ANY COMBINATION: CPT

## 2023-03-18 PROCEDURE — A4216 STERILE WATER/SALINE, 10 ML: HCPCS | Performed by: INTERNAL MEDICINE

## 2023-03-18 PROCEDURE — 87040 BLOOD CULTURE FOR BACTERIA: CPT

## 2023-03-18 PROCEDURE — 2580000003 HC RX 258: Performed by: NURSE PRACTITIONER

## 2023-03-18 PROCEDURE — 87186 SC STD MICRODIL/AGAR DIL: CPT

## 2023-03-18 PROCEDURE — 6370000000 HC RX 637 (ALT 250 FOR IP): Performed by: PHYSICIAN ASSISTANT

## 2023-03-18 PROCEDURE — 83735 ASSAY OF MAGNESIUM: CPT

## 2023-03-18 PROCEDURE — 93005 ELECTROCARDIOGRAM TRACING: CPT | Performed by: THORACIC SURGERY (CARDIOTHORACIC VASCULAR SURGERY)

## 2023-03-18 PROCEDURE — 87070 CULTURE OTHR SPECIMN AEROBIC: CPT

## 2023-03-18 PROCEDURE — C9113 INJ PANTOPRAZOLE SODIUM, VIA: HCPCS | Performed by: INTERNAL MEDICINE

## 2023-03-18 PROCEDURE — 2580000003 HC RX 258: Performed by: INTERNAL MEDICINE

## 2023-03-18 PROCEDURE — 99291 CRITICAL CARE FIRST HOUR: CPT | Performed by: INTERNAL MEDICINE

## 2023-03-18 PROCEDURE — 84145 PROCALCITONIN (PCT): CPT

## 2023-03-18 PROCEDURE — 87077 CULTURE AEROBIC IDENTIFY: CPT

## 2023-03-18 RX ORDER — ALLOPURINOL 100 MG/1
100 TABLET ORAL
Status: DISCONTINUED | OUTPATIENT
Start: 2023-03-20 | End: 2023-03-27 | Stop reason: HOSPADM

## 2023-03-18 RX ORDER — DOBUTAMINE HYDROCHLORIDE 200 MG/100ML
2.5-1 INJECTION INTRAVENOUS CONTINUOUS
Status: DISCONTINUED | OUTPATIENT
Start: 2023-03-18 | End: 2023-03-24

## 2023-03-18 RX ORDER — ACETAMINOPHEN 650 MG/1
650 SUPPOSITORY RECTAL EVERY 6 HOURS PRN
Status: DISCONTINUED | OUTPATIENT
Start: 2023-03-18 | End: 2023-03-24

## 2023-03-18 RX ORDER — ACETAMINOPHEN 325 MG/1
650 TABLET ORAL EVERY 4 HOURS PRN
Status: DISCONTINUED | OUTPATIENT
Start: 2023-03-18 | End: 2023-03-27 | Stop reason: HOSPADM

## 2023-03-18 RX ADMIN — Medication 1 AMPULE: at 09:35

## 2023-03-18 RX ADMIN — SODIUM CHLORIDE, PRESERVATIVE FREE 10 ML: 5 INJECTION INTRAVENOUS at 21:23

## 2023-03-18 RX ADMIN — ACETAMINOPHEN 650 MG: 325 TABLET ORAL at 01:36

## 2023-03-18 RX ADMIN — FENTANYL CITRATE 125 MCG/HR: 50 INJECTION, SOLUTION INTRAMUSCULAR; INTRAVENOUS at 15:00

## 2023-03-18 RX ADMIN — SODIUM CHLORIDE 3.2 UNITS/HR: 9 INJECTION, SOLUTION INTRAVENOUS at 09:06

## 2023-03-18 RX ADMIN — SODIUM BICARBONATE: 84 INJECTION, SOLUTION INTRAVENOUS at 21:16

## 2023-03-18 RX ADMIN — FENTANYL CITRATE 50 MCG: 50 INJECTION, SOLUTION INTRAMUSCULAR; INTRAVENOUS at 14:30

## 2023-03-18 RX ADMIN — CHLORHEXIDINE GLUCONATE 10 ML: 1.2 SOLUTION ORAL at 09:50

## 2023-03-18 RX ADMIN — CEFAZOLIN SODIUM 2000 MG: 100 INJECTION, POWDER, LYOPHILIZED, FOR SOLUTION INTRAVENOUS at 01:11

## 2023-03-18 RX ADMIN — SODIUM CHLORIDE, PRESERVATIVE FREE 10 ML: 5 INJECTION INTRAVENOUS at 09:30

## 2023-03-18 RX ADMIN — FENTANYL CITRATE 125 MCG/HR: 50 INJECTION, SOLUTION INTRAMUSCULAR; INTRAVENOUS at 22:13

## 2023-03-18 RX ADMIN — FAMOTIDINE 20 MG: 10 INJECTION, SOLUTION INTRAVENOUS at 09:29

## 2023-03-18 RX ADMIN — LEVOTHYROXINE SODIUM 175 MCG: 50 TABLET ORAL at 05:03

## 2023-03-18 RX ADMIN — ASPIRIN 81 MG: 81 TABLET ORAL at 09:31

## 2023-03-18 RX ADMIN — ACETAMINOPHEN 650 MG: 325 TABLET, FILM COATED ORAL at 21:24

## 2023-03-18 RX ADMIN — MIDAZOLAM 5 MG/HR: 5 INJECTION INTRAMUSCULAR; INTRAVENOUS at 15:00

## 2023-03-18 RX ADMIN — FENTANYL CITRATE 125 MCG/HR: 50 INJECTION, SOLUTION INTRAMUSCULAR; INTRAVENOUS at 01:15

## 2023-03-18 RX ADMIN — CHLORHEXIDINE GLUCONATE 10 ML: 1.2 SOLUTION ORAL at 21:24

## 2023-03-18 RX ADMIN — DOBUTAMINE HYDROCHLORIDE 2.5 MCG/KG/MIN: 200 INJECTION INTRAVENOUS at 11:30

## 2023-03-18 RX ADMIN — CEFAZOLIN SODIUM 2000 MG: 100 INJECTION, POWDER, LYOPHILIZED, FOR SOLUTION INTRAVENOUS at 09:30

## 2023-03-18 RX ADMIN — FENOFIBRATE 160 MG: 160 TABLET, FILM COATED ORAL at 09:30

## 2023-03-18 RX ADMIN — INSULIN GLARGINE 5 UNITS: 100 INJECTION, SOLUTION SUBCUTANEOUS at 09:31

## 2023-03-18 RX ADMIN — Medication 1 AMPULE: at 21:24

## 2023-03-18 RX ADMIN — PANTOPRAZOLE SODIUM 40 MG: 40 INJECTION, POWDER, LYOPHILIZED, FOR SOLUTION INTRAVENOUS at 09:29

## 2023-03-18 RX ADMIN — ATORVASTATIN CALCIUM 80 MG: 80 TABLET, FILM COATED ORAL at 21:24

## 2023-03-18 RX ADMIN — FENTANYL CITRATE 125 MCG/HR: 50 INJECTION, SOLUTION INTRAMUSCULAR; INTRAVENOUS at 08:11

## 2023-03-18 RX ADMIN — ACETAMINOPHEN 650 MG: 325 TABLET, FILM COATED ORAL at 12:50

## 2023-03-18 ASSESSMENT — PULMONARY FUNCTION TESTS
PIF_VALUE: 23
PIF_VALUE: 23
PIF_VALUE: 22
PIF_VALUE: 21
PIF_VALUE: 22
PIF_VALUE: 23
PIF_VALUE: 22
PIF_VALUE: 23
PIF_VALUE: 22
PIF_VALUE: 23
PIF_VALUE: 22
PIF_VALUE: 23
PIF_VALUE: 23
PIF_VALUE: 25
PIF_VALUE: 21
PIF_VALUE: 22
PIF_VALUE: 23
PIF_VALUE: 23
PIF_VALUE: 21
PIF_VALUE: 22
PIF_VALUE: 23
PIF_VALUE: 23
PIF_VALUE: 22
PIF_VALUE: 23
PIF_VALUE: 21
PIF_VALUE: 22
PIF_VALUE: 23
PIF_VALUE: 22
PIF_VALUE: 23
PIF_VALUE: 21
PIF_VALUE: 22
PIF_VALUE: 22
PIF_VALUE: 21
PIF_VALUE: 23
PIF_VALUE: 18
PIF_VALUE: 21
PIF_VALUE: 22
PIF_VALUE: 22
PIF_VALUE: 21
PIF_VALUE: 23
PIF_VALUE: 23
PIF_VALUE: 22
PIF_VALUE: 23
PIF_VALUE: 22
PIF_VALUE: 18
PIF_VALUE: 22
PIF_VALUE: 23
PIF_VALUE: 22
PIF_VALUE: 24
PIF_VALUE: 22
PIF_VALUE: 23
PIF_VALUE: 22
PIF_VALUE: 24
PIF_VALUE: 24
PIF_VALUE: 222
PIF_VALUE: 24
PIF_VALUE: 23
PIF_VALUE: 22

## 2023-03-18 ASSESSMENT — PAIN - FUNCTIONAL ASSESSMENT: PAIN_FUNCTIONAL_ASSESSMENT: PREVENTS OR INTERFERES SOME ACTIVE ACTIVITIES AND ADLS

## 2023-03-18 ASSESSMENT — PAIN DESCRIPTION - DESCRIPTORS: DESCRIPTORS: ACHING

## 2023-03-18 ASSESSMENT — PAIN SCALES - GENERAL
PAINLEVEL_OUTOF10: 0
PAINLEVEL_OUTOF10: 8
PAINLEVEL_OUTOF10: 0

## 2023-03-18 ASSESSMENT — PAIN DESCRIPTION - ORIENTATION: ORIENTATION: MID

## 2023-03-18 ASSESSMENT — PAIN DESCRIPTION - LOCATION: LOCATION: CHEST

## 2023-03-18 NOTE — PROGRESS NOTES
Today's Date: 3/18/2023  Date of Admission: 3/15/2023    Chart Reviewed. Subjective:     Postop day 1  CABG x4  Impella implant  Still intubated but moves all 4  Off all pressors except 3 mics of dopamine    Medications Reviewed. Objective:     Vitals:    03/18/23 0932 03/18/23 1002 03/18/23 1015 03/18/23 1032   BP: 113/74 115/74  108/73   Pulse: 96 97 96 96   Resp: 15 17 13 13   Temp:       TempSrc:       SpO2: 99% 99% 99% 99%   Weight:       Height:           Intake and Output  Current Shift: No intake/output data recorded. Last 3 Shifts: 03/16 1901 - 03/18 0700  In: 5005.8 [I.V.:3270]  Out: 2777 [Urine:2167]    Physical Exam  Gen- the patient is well developed and in no acute distress. HEENT- PERRL, EOMI, no scleral icterus       oral [place] moist without cyanosis. Neck- the neck is supple; there is no JVD. Lungs-clear but diminished  Heart- RRR. There is no murmur. Abd- soft and non-tender, unremarkable bowel sounds. Ext- warm without cyanosis. There is trace edema. Skin- no jaundice or rashes. Neuro-intubated and sedated but moves all 4    LAB  Recent Labs     03/17/23 2057 03/18/23  0118 03/18/23  0503   WBC 6.4 6.6 6.2   HGB 9.4* 9.3* 9.2*   HCT 28.2* 28.5* 27.8*   * 141* 141*     Recent Labs     03/16/23  0858 03/16/23  1508 03/16/23 2038 03/17/23  0355 03/17/23  1534 03/17/23 2057 03/18/23  0118 03/18/23  0503      < > 134 137 140  --   --  143   K 4.0   < > 5.1 5.2* 4.4 4.2 4.2 4.1      < > 106 108 109  --   --  114*   CO2 21   < > 21 16* 23  --   --  25   BUN 25*   < > 31* 36* 41*  --   --  40*   MG 2.2  --  3.1* 2.8* 3.8* 3.4* 3.5* 3.2*   PHOS  --   --  3.9*  --   --   --   --   --    INR 1.0  --   --   --  1.3  --   --   --     < > = values in this interval not displayed. No results for input(s): PH, PCO2, PO2, HCO3 in the last 72 hours.     Assessment:     Principal Problem:    NSTEMI (non-ST elevated myocardial infarction) [facility])  Active Problems: History of coronary artery stent placement    Cardiac arrest ([facility]    Acute respiratory failure with hypoxia (HCC)    Ventricular arrhythmia    Acute myocardial infarction, subendocardial infarction, initial episode of care [facility])    Diabetes mellitus type 1.5 ([facility]    Essential hypertension    Hyperlipidemia  Resolved Problems:    * No resolved hospital problems.  *      Plan:     Stable hemodynamics  Mild renal insufficiency, worsening yesterday  Renal is following  We will add dobutamine for renal perfusion  Wean as tolerated  Per cardiology and pulmonary  Overall, slight improvement    Jihan Banks MD

## 2023-03-18 NOTE — PROGRESS NOTES
Physical Therapy Note:    Attempted to see patient this AM for physical therapy evaluation session. Patient is on hold until 3/20, and may check back at that time. He is sedated, on vent and being weaned from pressors. . Will follow and re-attempt as schedule permits/patient available.  Thank you,    Benjy Leger, PT     [facility] [employer]

## 2023-03-18 NOTE — PROGRESS NOTES
[facility] CARDIOLOGY PROGRESS NOTE           3/18/2023 7:11 AM    Admit Date: 3/15/2023         Subjective: Complex presentation. Originally had acute coronary syndrome involving the circumflex artery. Decided due to multivessel disease and failure of previous stents to pursue bypass surgery after diagnostic heart cath. Subsequently had 2 cardiac arrest most recently was yesterday morning. Was taken to the OR yesterday and had via direct aortic and Impella 5 5 placed as well as coronary artery bypass grafting x4 vessels left atrial appendage clipping. Now postoperative overnight did well. Had moments of loss of pulsatility based on the arterial waveform. Currently doing well in sinus rhythm. Adequate  and radha scores. Well supported.     ROS:  Could not complete due to patient's condition sedated and ventilated    Objective:      Vitals:    03/18/23 0545 03/18/23 0600 03/18/23 0615 03/18/23 0630   BP:  101/70  104/73   Pulse: 92 92 92 92   Resp: 16 16 16 16   Temp:       TempSrc:       SpO2: 100% 100% 100% 99%   Weight:       Height:           On telemetry:sr      Physical Exam:  General: Acutely ill sedated and ventilated   Neck: supple, no JVD  Heart: S1S2 with RRR without murmurs or gallops  Lungs: Clear throughout auscultation bilaterally without adventitious sounds  Abd: soft, nontender, nondistended, with good bowel sounds  Ext: no edema bilaterally  Skin: warm and dry      Data Review:   Recent Labs     03/16/23  0858 03/16/23  1508 03/17/23  1534 03/17/23  2057 03/18/23  0118 03/18/23  0503      < > 140  --   --  143   K 4.0   < > 4.4   < > 4.2 4.1   MG 2.2   < > 3.8*   < > 3.5* 3.2*   BUN 25*   < > 41*  --   --  40*   WBC 6.3   < > 6.7   < > 6.6 6.2   HGB 11.3*   < > 10.2*   < > 9.3* 9.2*   HCT 35.1*   < > 31.5*   < > 28.5* 27.8*      < > 135*   < > 141* 141*   INR 1.0  --  1.3  --   --   --    CHOL 140  --   --   --   --   --    HDL 35*  --   --   --   --   -- < > = values in this interval not displayed. No results for input(s): TNIPOC in the last 72 hours. Invalid input(s): TROIQ        Assessment/Plan:     Principal Problem:    NSTEMI (non-ST elevated myocardial infarction) ([facility]  Active Problems:    History of coronary artery stent placement    Cardiac arrest ([facility]    Acute respiratory failure with hypoxia (HCC)    Ventricular arrhythmia    Acute myocardial infarction, subendocardial infarction, initial episode of care ([facility]    Diabetes mellitus type 1.5 ([facility]    Essential hypertension    Hyperlipidemia  Resolved Problems:    * No resolved hospital problems. *    A/P  1) CAD -continue aspirin 81 mg daily atorvastatin 80 mg daily status post coronary artery bypass grafting  2) post VF arrest -continue amiodarone drip we will decrease to 0.5 mg/min felt to be ischemia driven  3) Impella LV support -hemoglobin is stable no heparin at this point postoperatively. Continue to monitor CBC regularly. Output calculations including  and PA PI indicate that he is well supported. Dulce catheter and Impella device show excellent cardiac output. 4) mild fever likely postoperative we will check blood and urine cultures today. We will plan to continue current therapies throughout the weekend. Likely will start Impella wean late on Sunday to see if he is able to tolerate removal Monday.     Alicia Naik MD  3/18/2023 7:11 AM

## 2023-03-18 NOTE — RT PROTOCOL NOTE
Ventilator check complete; patient has a #7.5 ET tube secured at the 26 at the teeth. Patient is sedated. Patient is able to follow some commands. Breath sounds are mainly CTAB. Trachea is midline, negative for subcutaneous air, and chest excursion is symmetrical. Patient is also negative for cyanosis and is negative for pitting edema. All alarms are set and audible. Resuscitation bag and mask are at the head of the bed.        Ventilator Settings  Mode FIO2 Rate Tidal Volume Pressure PEEP I:E Ratio     VC+/SIMV  45%   16 BPM    550 ML   15cm H2O   8 cm H20   1:2.9      Peak airway pressure:   22 cm H20  Minute ventilation:   9.14l/m     Salina Agarwal, RRT,RCP

## 2023-03-18 NOTE — CONSULTS
performed an independent interpretation of the patient's images. CXR:  reviewed. Recent Labs     03/15/23  1057 03/15/23  1448 03/15/23  1917 03/16/23  0858 03/16/23  1508 03/16/23  2038 03/16/23 2039 03/17/23  0355 03/17/23  1534 03/17/23  2057 03/18/23  0118 03/18/23  0503   WBC 6.8  --   --  6.3 6.2  --    < > 5.4 6.7 6.4 6.6 6.2   HGB 11.4*  --   --  11.3* 11.3*  --    < > 9.1* 10.2* 9.4* 9.3* 9.2*   HCT 34.3*  --   --  35.1* 35.4*  --    < > 27.5* 31.5* 28.2* 28.5* 27.8*     --   --  253 242  --    < > 168 135* 134* 141* 141*   INR  --   --   --  1.0  --   --   --   --  1.3  --   --   --      --   --  138 137 134  --  137 140  --   --  143   K 4.4  --   --  4.0 4.6 5.1  --  5.2* 4.4 4.2 4.2 4.1     --   --  107 106 106  --  108 109  --   --  114*   CO2 24  --   --  21 21 21  --  16* 23  --   --  25   BUN 35*  --   --  25* 26* 31*  --  36* 41*  --   --  40*   CREATININE 2.17*  --   --  1.90* 1.80* 2.20*  --  2.40* 2.80*  --   --  3.50*   MG 2.3  --   --  2.2  --  3.1*  --  2.8* 3.8* 3.4* 3.5* 3.2*   PHOS  --   --   --   --   --  3.9*  --   --   --   --   --   --    BILITOT 0.4  --   --   --  0.6 0.8  --   --   --   --   --   --    AST 84*  --   --   --  55* 1,197*  --   --   --   --   --   --    ALT 61  --   --   --  49 651*  --   --   --   --   --   --    ALKPHOS 81  --   --   --  74 90  --   --   --   --   --   --    TROPHS  --  7,040.5* 6,723.4*  --   --   --   --   --   --   --   --   --     < > = values in this interval not displayed. ECHO: 03/15/23    TRANSTHORACIC ECHOCARDIOGRAM (TTE) COMPLETE (CONTRAST/BUBBLE/3D PRN) 03/15/2023  4:28 PM, 03/15/2023 12:00 AM (Final)    Interpretation Summary    Left Ventricle: Normal left ventricular systolic function with a visually estimated EF of 50 - 55%. Left ventricle size is normal. Normal wall thickness. Mild to moderate basal inferolateral wall hypokinesis noted. Normal diastolic function. Average E/e' ratio is 13.57.     Aortic Valve: Mild stenosis of the aortic valve. AV mean gradient is 12 mmHg. AV peak gradient is 19 mmHg. AV area by continuity VTI is 1.8 cm2. Mitral Valve: Mild regurgitation. Left Atrium: Left atrium is mildly dilated. LA Vol Index is  31 ml/m2. Signed by: Alan Foley MD on 3/15/2023  4:28 PM, Signed by: Unknown Provider Result on 3/15/2023 12:00 AM    MICRO:   Recent Labs     03/15/23  1543 03/16/23 2038   CULTURE MRSA target DNA not detected, SA target DNA detected. A MRSA negative, SA positive test result does not preclude MRSA nasal colonization. * MRSA target DNA not detected, SA target DNA detected. A MRSA negative, SA positive test result does not preclude MRSA nasal colonization. 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 Glucose controlled      Full Code    More than 50% of the time documented was spent in face-to-face contact with the patient and in the care of the patient on the floor/unit where the patient is located.   The patient is critically ill with respiratory failure, circulatory failure and requires high complexity decision making for assessment and support including frequent ventilator adjustment , frequent evaluation and titration of therapies , application of advanced monitoring technologies and

## 2023-03-18 NOTE — RT PROTOCOL NOTE
Ventilator check complete; patient has a #7.5 ET tube secured at the 26 at his teeth centered with bite protector. Patient is  sedated but rouseable. Patient is able to follow commands. Breath sounds are mainly CTAB. Trachea is midline, Negative for subcutaneous air, and chest excursion is symmetrical. Patient is also Negative for cyanosis and is Negative for pitting edema. All alarms are set and audible. Resuscitation bag  and mask are at the head of the bed.        Ventilator Settings  Mode FIO2 Rate Tidal Volume Pressure PEEP I:E Ratio   AC/VC+ 60 %   18 550cc      12cm H2O 1:2.87       Peak airway pressure:   26 cm H2O  Minute ventilation:   10.1    Shay Martinez, RRT, RCP

## 2023-03-18 NOTE — PROGRESS NOTES
Ventilator check complete; patient has a #7.5 ET tube secured at the 26 at the teeth. Patient is sedated. Patient is not able to follow commands. Breath sounds are clear. Trachea is midline, negative for subcutaneous air, and chest excursion is symmetric. Patient is also negative for cyanosis and is negative for pitting edema. All alarms are set and audible. Resuscitation bag is at the head of the bed.       Ventilator Settings  Mode FIO2 Rate Tidal Volume Pressure PEEP I:E Ratio     VC+/SIMV  45%   16 BPM    500 ML     8 cm H20   1:2.8     Peak airway pressure:   18 cm H20  Minute ventilation:   9.8 l/m         Abdoul Carlos RCP

## 2023-03-18 NOTE — PROGRESS NOTES
[facility] PROGRESS NOTE    Follow up for: ANNMARIE    Subjective:   Patient intubated/sedated  1.5L of urine output in the last 24 hours  Remains in the ICU  No distress  On pressors     ROS:  UTO    Objective:   Exam:  Vitals:    03/18/23 0600 03/18/23 0615 03/18/23 0630 03/18/23 0723   BP: 101/70  104/73    Pulse: 92 92 92 93   Resp: 16 16 16 16   Temp:       TempSrc:       SpO2: 100% 100% 99% 100%   Weight:    239 lb (108.4 kg)   Height:    6' (1.829 m)         Intake/Output Summary (Last 24 hours) at 3/18/2023 0739  Last data filed at 3/18/2023 0600  Gross per 24 hour   Intake 2235.23 ml   Output 1802 ml   Net 433.23 ml       Current Facility-Administered Medications   Medication Dose Route Frequency    acetaminophen (TYLENOL) tablet 650 mg  650 mg Oral Q4H PRN    Or    acetaminophen (TYLENOL) suppository 650 mg  650 mg Rectal Q6H PRN    alcohol 62% (NOZIN) nasal  1 ampule  1 ampule Topical Q12H    amiodarone (CORDARONE) 150 mg in dextrose 5 % 100 mL bolus  150 mg IntraVENous Once    heparin (porcine) 12,500 Units in dextrose 5 % 500 mL infusion (FOR IMPELLA PURGE)   IntraCATHeter Continuous    sodium bicarbonate 12.5 mEq in dextrose 5 % 500 mL infusion (FOR IMPELLA PURGE)   IntraCATHeter Continuous    dextrose 5 % and 0.45 % NaCl with KCl 20 mEq infusion   IntraVENous Continuous    sodium chloride flush 0.9 % injection 5-40 mL  5-40 mL IntraVENous 2 times per day    sodium chloride flush 0.9 % injection 5-40 mL  5-40 mL IntraVENous PRN    0.9 % sodium chloride infusion   IntraVENous PRN    ondansetron (ZOFRAN) injection 4 mg  4 mg IntraVENous Q4H PRN    aspirin EC tablet 81 mg  81 mg Oral Daily    oxyCODONE-acetaminophen (PERCOCET) 5-325 MG per tablet 1 tablet  1 tablet Oral Q4H PRN    morphine sulfate (PF) injection 3 mg  3 mg IntraVENous Q1H PRN    Or    morphine sulfate (PF) injection 4 mg  4 mg IntraVENous Q1H PRN    [Held by provider] amiodarone (CORDARONE) tablet 200 mg  200 mg Oral BID IntraVENous Continuous    metoprolol (LOPRESSOR) injection 5 mg  5 mg IntraVENous Q6H    propofol injection  5-50 mcg/kg/min IntraVENous Continuous    fentaNYL (SUBLIMAZE) injection 50 mcg  50 mcg IntraVENous Q1H PRN    midazolam (VERSED) injection 2 mg  2 mg IntraVENous Q1H PRN    norepinephrine (LEVOPHED) 16 mg in sodium chloride 0.9 % 250 mL infusion  1-30 mcg/min IntraVENous Continuous    pantoprazole (PROTONIX) 40 mg in sodium chloride (PF) 0.9 % 10 mL injection  40 mg IntraVENous Daily    allopurinol (ZYLOPRIM) tablet 100 mg  100 mg Oral Daily    glucose chewable tablet 16 g  4 tablet Oral PRN    dextrose bolus 10% 125 mL  125 mL IntraVENous PRN    Or    dextrose bolus 10% 250 mL  250 mL IntraVENous PRN    glucagon (rDNA) injection 1 mg  1 mg SubCUTAneous PRN    dextrose 10 % infusion   IntraVENous Continuous PRN    fenofibrate (TRIGLIDE) tablet 160 mg  160 mg Oral Daily    insulin lispro (HUMALOG) injection vial 0-8 Units  0-8 Units SubCUTAneous TID WC    insulin lispro (HUMALOG) injection vial 0-4 Units  0-4 Units SubCUTAneous Nightly    sodium chloride flush 0.9 % injection 5-40 mL  5-40 mL IntraVENous 2 times per day    sodium chloride flush 0.9 % injection 5-40 mL  5-40 mL IntraVENous PRN    0.9 % sodium chloride infusion   IntraVENous PRN    ondansetron (ZOFRAN-ODT) disintegrating tablet 4 mg  4 mg Oral Q8H PRN    Or    ondansetron (ZOFRAN) injection 4 mg  4 mg IntraVENous Q6H PRN    polyethylene glycol (GLYCOLAX) packet 17 g  17 g Oral Daily PRN    nitroGLYCERIN (NITROSTAT) SL tablet 0.4 mg  0.4 mg SubLINGual Q5 Min PRN    potassium chloride (KLOR-CON M) extended release tablet 40 mEq  40 mEq Oral PRN    Or    potassium bicarb-citric acid (EFFER-K) effervescent tablet 40 mEq  40 mEq Oral PRN    Or    potassium chloride 10 mEq/100 mL IVPB (Peripheral Line)  10 mEq IntraVENous PRN    magnesium sulfate 2000 mg in 50 mL IVPB premix  2,000 mg IntraVENous PRN    aluminum & magnesium hydroxide-simethicone

## 2023-03-19 ENCOUNTER — APPOINTMENT (OUTPATIENT)
Dept: GENERAL RADIOLOGY | Age: 68
DRG: 215 | End: 2023-03-19
Payer: COMMERCIAL

## 2023-03-19 PROBLEM — J81.0 ACUTE PULMONARY EDEMA (HCC): Status: ACTIVE | Noted: 2023-03-19

## 2023-03-19 PROBLEM — N17.9 AKI (ACUTE KIDNEY INJURY) (HCC): Status: ACTIVE | Noted: 2023-03-19

## 2023-03-19 LAB
ANION GAP SERPL CALC-SCNC: 1 MMOL/L (ref 2–11)
ARTERIAL PATENCY WRIST A: ABNORMAL
ARTERIAL PATENCY WRIST A: ABNORMAL
BASE EXCESS BLD CALC-SCNC: 0.3 MMOL/L
BASE EXCESS BLDV CALC-SCNC: 0.2 MMOL/L
BASOPHILS # BLD: 0 K/UL (ref 0–0.2)
BASOPHILS NFR BLD: 0 % (ref 0–2)
BDY SITE: ABNORMAL
BDY SITE: ABNORMAL
BUN SERPL-MCNC: 38 MG/DL (ref 8–23)
CALCIUM SERPL-MCNC: 7.9 MG/DL (ref 8.3–10.4)
CHLORIDE SERPL-SCNC: 117 MMOL/L (ref 101–110)
CO2 SERPL-SCNC: 27 MMOL/L (ref 21–32)
CREAT SERPL-MCNC: 2.4 MG/DL (ref 0.8–1.5)
DIFFERENTIAL METHOD BLD: ABNORMAL
EKG ATRIAL RATE: 99 BPM
EKG DIAGNOSIS: NORMAL
EKG P AXIS: 75 DEGREES
EKG P-R INTERVAL: 188 MS
EKG Q-T INTERVAL: 360 MS
EKG QRS DURATION: 88 MS
EKG QTC CALCULATION (BAZETT): 462 MS
EKG R AXIS: -15 DEGREES
EKG T AXIS: 63 DEGREES
EKG VENTRICULAR RATE: 99 BPM
EOSINOPHIL # BLD: 0.1 K/UL (ref 0–0.8)
EOSINOPHIL NFR BLD: 1 % (ref 0.5–7.8)
ERYTHROCYTE [DISTWIDTH] IN BLOOD BY AUTOMATED COUNT: 15.7 % (ref 11.9–14.6)
GAS FLOW.O2 O2 DELIVERY SYS: ABNORMAL
GAS FLOW.O2 O2 DELIVERY SYS: ABNORMAL
GLUCOSE BLD STRIP.AUTO-MCNC: 107 MG/DL (ref 65–100)
GLUCOSE BLD STRIP.AUTO-MCNC: 108 MG/DL (ref 65–100)
GLUCOSE BLD STRIP.AUTO-MCNC: 108 MG/DL (ref 65–100)
GLUCOSE BLD STRIP.AUTO-MCNC: 110 MG/DL (ref 65–100)
GLUCOSE BLD STRIP.AUTO-MCNC: 111 MG/DL (ref 65–100)
GLUCOSE BLD STRIP.AUTO-MCNC: 112 MG/DL (ref 65–100)
GLUCOSE BLD STRIP.AUTO-MCNC: 112 MG/DL (ref 65–100)
GLUCOSE BLD STRIP.AUTO-MCNC: 114 MG/DL (ref 65–100)
GLUCOSE BLD STRIP.AUTO-MCNC: 115 MG/DL (ref 65–100)
GLUCOSE BLD STRIP.AUTO-MCNC: 115 MG/DL (ref 65–100)
GLUCOSE BLD STRIP.AUTO-MCNC: 117 MG/DL (ref 65–100)
GLUCOSE BLD STRIP.AUTO-MCNC: 117 MG/DL (ref 65–100)
GLUCOSE BLD STRIP.AUTO-MCNC: 121 MG/DL (ref 65–100)
GLUCOSE BLD STRIP.AUTO-MCNC: 123 MG/DL (ref 65–100)
GLUCOSE BLD STRIP.AUTO-MCNC: 128 MG/DL (ref 65–100)
GLUCOSE BLD STRIP.AUTO-MCNC: 137 MG/DL (ref 65–100)
GLUCOSE SERPL-MCNC: 103 MG/DL (ref 65–100)
HCO3 BLD-SCNC: 24.2 MMOL/L (ref 22–26)
HCO3 BLDV-SCNC: 24.5 MMOL/L (ref 23–28)
HCT VFR BLD AUTO: 25.5 % (ref 41.1–50.3)
HGB BLD-MCNC: 8.1 G/DL (ref 13.6–17.2)
IMM GRANULOCYTES # BLD AUTO: 0 K/UL (ref 0–0.5)
IMM GRANULOCYTES NFR BLD AUTO: 1 % (ref 0–5)
LYMPHOCYTES # BLD: 0.6 K/UL (ref 0.5–4.6)
LYMPHOCYTES NFR BLD: 9 % (ref 13–44)
MAGNESIUM SERPL-MCNC: 2.8 MG/DL (ref 1.8–2.4)
MAGNESIUM SERPL-MCNC: 2.8 MG/DL (ref 1.8–2.4)
MCH RBC QN AUTO: 29.2 PG (ref 26.1–32.9)
MCHC RBC AUTO-ENTMCNC: 31.8 G/DL (ref 31.4–35)
MCV RBC AUTO: 92.1 FL (ref 82–102)
MM INDURATION, POC: 0 MM (ref 0–5)
MONOCYTES # BLD: 0.7 K/UL (ref 0.1–1.3)
MONOCYTES NFR BLD: 12 % (ref 4–12)
NEUTS SEG # BLD: 4.5 K/UL (ref 1.7–8.2)
NEUTS SEG NFR BLD: 77 % (ref 43–78)
NRBC # BLD: 0 K/UL (ref 0–0.2)
O2/TOTAL GAS SETTING VFR VENT: 45 %
O2/TOTAL GAS SETTING VFR VENT: 45 %
PCO2 BLD: 35.6 MMHG (ref 35–45)
PCO2 BLDV: 37.7 MMHG (ref 41–51)
PEEP RESPIRATORY: 8 CMH2O
PEEP RESPIRATORY: 8 CMH2O
PH BLD: 7.44 (ref 7.35–7.45)
PH BLDV: 7.42 (ref 7.32–7.42)
PLATELET # BLD AUTO: 116 K/UL (ref 150–450)
PMV BLD AUTO: 10.8 FL (ref 9.4–12.3)
PO2 BLD: 110 MMHG (ref 75–100)
PO2 BLDV: 30 MMHG
POTASSIUM SERPL-SCNC: 4.2 MMOL/L (ref 3.5–5.1)
POTASSIUM SERPL-SCNC: 4.3 MMOL/L (ref 3.5–5.1)
PPD, POC: NEGATIVE
PRESSURE SUPPORT SETTING VENT: 15 CMH2O
PRESSURE SUPPORT SETTING VENT: 15 CMH2O
RBC # BLD AUTO: 2.77 M/UL (ref 4.23–5.6)
RESPIRATORY RATE, POC: 16 (ref 5–40)
RESPIRATORY RATE, POC: 16 (ref 5–40)
SAO2 % BLD: 98.5 % (ref 95–98)
SAO2 % BLDV: 58.9 % (ref 65–88)
SERVICE CMNT-IMP: ABNORMAL
SODIUM SERPL-SCNC: 145 MMOL/L (ref 133–143)
SPECIMEN TYPE: ABNORMAL
SPECIMEN TYPE: ABNORMAL
VENTILATION MODE VENT: ABNORMAL
VENTILATION MODE VENT: ABNORMAL
VT SETTING VENT: 550 ML
VT SETTING VENT: 550 ML
WBC # BLD AUTO: 5.8 K/UL (ref 4.3–11.1)

## 2023-03-19 PROCEDURE — 6360000002 HC RX W HCPCS: Performed by: INTERNAL MEDICINE

## 2023-03-19 PROCEDURE — 82803 BLOOD GASES ANY COMBINATION: CPT

## 2023-03-19 PROCEDURE — 84132 ASSAY OF SERUM POTASSIUM: CPT

## 2023-03-19 PROCEDURE — 37799 UNLISTED PX VASCULAR SURGERY: CPT

## 2023-03-19 PROCEDURE — 2500000003 HC RX 250 WO HCPCS: Performed by: THORACIC SURGERY (CARDIOTHORACIC VASCULAR SURGERY)

## 2023-03-19 PROCEDURE — 99232 SBSQ HOSP IP/OBS MODERATE 35: CPT | Performed by: INTERNAL MEDICINE

## 2023-03-19 PROCEDURE — 83735 ASSAY OF MAGNESIUM: CPT

## 2023-03-19 PROCEDURE — 6370000000 HC RX 637 (ALT 250 FOR IP): Performed by: NURSE PRACTITIONER

## 2023-03-19 PROCEDURE — 82962 GLUCOSE BLOOD TEST: CPT

## 2023-03-19 PROCEDURE — 36592 COLLECT BLOOD FROM PICC: CPT

## 2023-03-19 PROCEDURE — 71045 X-RAY EXAM CHEST 1 VIEW: CPT

## 2023-03-19 PROCEDURE — C9113 INJ PANTOPRAZOLE SODIUM, VIA: HCPCS | Performed by: INTERNAL MEDICINE

## 2023-03-19 PROCEDURE — 2100000000 HC CCU R&B

## 2023-03-19 PROCEDURE — 6370000000 HC RX 637 (ALT 250 FOR IP): Performed by: PHYSICIAN ASSISTANT

## 2023-03-19 PROCEDURE — A4216 STERILE WATER/SALINE, 10 ML: HCPCS | Performed by: INTERNAL MEDICINE

## 2023-03-19 PROCEDURE — A4216 STERILE WATER/SALINE, 10 ML: HCPCS | Performed by: THORACIC SURGERY (CARDIOTHORACIC VASCULAR SURGERY)

## 2023-03-19 PROCEDURE — 94003 VENT MGMT INPAT SUBQ DAY: CPT

## 2023-03-19 PROCEDURE — 6370000000 HC RX 637 (ALT 250 FOR IP): Performed by: INTERNAL MEDICINE

## 2023-03-19 PROCEDURE — 99233 SBSQ HOSP IP/OBS HIGH 50: CPT | Performed by: INTERNAL MEDICINE

## 2023-03-19 PROCEDURE — 2580000003 HC RX 258: Performed by: NURSE PRACTITIONER

## 2023-03-19 PROCEDURE — 85025 COMPLETE CBC W/AUTO DIFF WBC: CPT

## 2023-03-19 PROCEDURE — 2580000003 HC RX 258: Performed by: THORACIC SURGERY (CARDIOTHORACIC VASCULAR SURGERY)

## 2023-03-19 PROCEDURE — 6370000000 HC RX 637 (ALT 250 FOR IP): Performed by: THORACIC SURGERY (CARDIOTHORACIC VASCULAR SURGERY)

## 2023-03-19 PROCEDURE — 2580000003 HC RX 258: Performed by: INTERNAL MEDICINE

## 2023-03-19 PROCEDURE — 93005 ELECTROCARDIOGRAM TRACING: CPT | Performed by: THORACIC SURGERY (CARDIOTHORACIC VASCULAR SURGERY)

## 2023-03-19 PROCEDURE — 6360000002 HC RX W HCPCS: Performed by: THORACIC SURGERY (CARDIOTHORACIC VASCULAR SURGERY)

## 2023-03-19 PROCEDURE — 80048 BASIC METABOLIC PNL TOTAL CA: CPT

## 2023-03-19 RX ORDER — FUROSEMIDE 10 MG/ML
40 INJECTION INTRAMUSCULAR; INTRAVENOUS EVERY 12 HOURS
Status: DISCONTINUED | OUTPATIENT
Start: 2023-03-19 | End: 2023-03-19

## 2023-03-19 RX ORDER — FUROSEMIDE 10 MG/ML
40 INJECTION INTRAMUSCULAR; INTRAVENOUS EVERY 12 HOURS
Status: COMPLETED | OUTPATIENT
Start: 2023-03-19 | End: 2023-03-21

## 2023-03-19 RX ORDER — FUROSEMIDE 10 MG/ML
40 INJECTION INTRAMUSCULAR; INTRAVENOUS ONCE
Status: COMPLETED | OUTPATIENT
Start: 2023-03-19 | End: 2023-03-19

## 2023-03-19 RX ADMIN — DEXTROSE MONOHYDRATE, SODIUM CHLORIDE, AND POTASSIUM CHLORIDE: 50; 4.5; 1.49 INJECTION, SOLUTION INTRAVENOUS at 04:58

## 2023-03-19 RX ADMIN — FUROSEMIDE 40 MG: 10 INJECTION, SOLUTION INTRAMUSCULAR; INTRAVENOUS at 20:47

## 2023-03-19 RX ADMIN — SODIUM CHLORIDE, PRESERVATIVE FREE 10 ML: 5 INJECTION INTRAVENOUS at 08:45

## 2023-03-19 RX ADMIN — METOPROLOL TARTRATE 12.5 MG: 25 TABLET, FILM COATED ORAL at 20:47

## 2023-03-19 RX ADMIN — SODIUM CHLORIDE 3.42 UNITS/HR: 9 INJECTION, SOLUTION INTRAVENOUS at 23:00

## 2023-03-19 RX ADMIN — ACETAMINOPHEN 650 MG: 325 TABLET, FILM COATED ORAL at 02:13

## 2023-03-19 RX ADMIN — MIDAZOLAM 5 MG/HR: 5 INJECTION INTRAMUSCULAR; INTRAVENOUS at 10:29

## 2023-03-19 RX ADMIN — PANTOPRAZOLE SODIUM 40 MG: 40 INJECTION, POWDER, LYOPHILIZED, FOR SOLUTION INTRAVENOUS at 09:56

## 2023-03-19 RX ADMIN — FAMOTIDINE 20 MG: 10 INJECTION, SOLUTION INTRAVENOUS at 09:56

## 2023-03-19 RX ADMIN — METOPROLOL TARTRATE 12.5 MG: 25 TABLET, FILM COATED ORAL at 13:30

## 2023-03-19 RX ADMIN — FENTANYL CITRATE 125 MCG/HR: 50 INJECTION, SOLUTION INTRAMUSCULAR; INTRAVENOUS at 07:05

## 2023-03-19 RX ADMIN — CHLORHEXIDINE GLUCONATE 10 ML: 1.2 SOLUTION ORAL at 20:53

## 2023-03-19 RX ADMIN — INSULIN GLARGINE 5 UNITS: 100 INJECTION, SOLUTION SUBCUTANEOUS at 13:31

## 2023-03-19 RX ADMIN — SODIUM BICARBONATE: 84 INJECTION, SOLUTION INTRAVENOUS at 21:03

## 2023-03-19 RX ADMIN — FENTANYL CITRATE 50 MCG: 50 INJECTION, SOLUTION INTRAMUSCULAR; INTRAVENOUS at 10:16

## 2023-03-19 RX ADMIN — FUROSEMIDE 40 MG: 10 INJECTION, SOLUTION INTRAMUSCULAR; INTRAVENOUS at 09:55

## 2023-03-19 RX ADMIN — SODIUM CHLORIDE, PRESERVATIVE FREE 10 ML: 5 INJECTION INTRAVENOUS at 20:52

## 2023-03-19 RX ADMIN — ASPIRIN 81 MG: 81 TABLET ORAL at 09:57

## 2023-03-19 RX ADMIN — SODIUM CHLORIDE: 9 INJECTION, SOLUTION INTRAVENOUS at 04:52

## 2023-03-19 RX ADMIN — ATORVASTATIN CALCIUM 80 MG: 80 TABLET, FILM COATED ORAL at 20:47

## 2023-03-19 RX ADMIN — Medication 1 AMPULE: at 20:47

## 2023-03-19 RX ADMIN — SODIUM CHLORIDE, PRESERVATIVE FREE 10 ML: 5 INJECTION INTRAVENOUS at 20:53

## 2023-03-19 RX ADMIN — LEVOTHYROXINE SODIUM 175 MCG: 50 TABLET ORAL at 05:48

## 2023-03-19 RX ADMIN — CHLORHEXIDINE GLUCONATE 10 ML: 1.2 SOLUTION ORAL at 09:57

## 2023-03-19 RX ADMIN — FENTANYL CITRATE 125 MCG/HR: 50 INJECTION, SOLUTION INTRAMUSCULAR; INTRAVENOUS at 20:53

## 2023-03-19 RX ADMIN — FENTANYL CITRATE 125 MCG/HR: 50 INJECTION, SOLUTION INTRAMUSCULAR; INTRAVENOUS at 13:00

## 2023-03-19 RX ADMIN — Medication 1 AMPULE: at 09:57

## 2023-03-19 ASSESSMENT — PULMONARY FUNCTION TESTS
PIF_VALUE: 25
PIF_VALUE: 26
PIF_VALUE: 25
PIF_VALUE: 24
PIF_VALUE: 25
PIF_VALUE: 22
PIF_VALUE: 25
PIF_VALUE: 24
PIF_VALUE: 25
PIF_VALUE: 23
PIF_VALUE: 24
PIF_VALUE: 22
PIF_VALUE: 22
PIF_VALUE: 27
PIF_VALUE: 25
PIF_VALUE: 25
PIF_VALUE: 26
PIF_VALUE: 26
PIF_VALUE: 22
PIF_VALUE: 26
PIF_VALUE: 23
PIF_VALUE: 25
PIF_VALUE: 25
PIF_VALUE: 26
PIF_VALUE: 22
PIF_VALUE: 26
PIF_VALUE: 26
PIF_VALUE: 24
PIF_VALUE: 24
PIF_VALUE: 22
PIF_VALUE: 26
PIF_VALUE: 25
PIF_VALUE: 27
PIF_VALUE: 25
PIF_VALUE: 22
PIF_VALUE: 20
PIF_VALUE: 26
PIF_VALUE: 23
PIF_VALUE: 25
PIF_VALUE: 18
PIF_VALUE: 25
PIF_VALUE: 26
PIF_VALUE: 25
PIF_VALUE: 22
PIF_VALUE: 16
PIF_VALUE: 21
PIF_VALUE: 22
PIF_VALUE: 21
PIF_VALUE: 22
PIF_VALUE: 24
PIF_VALUE: 23
PIF_VALUE: 25
PIF_VALUE: 25
PIF_VALUE: 24
PIF_VALUE: 25
PIF_VALUE: 22
PIF_VALUE: 21
PIF_VALUE: 24
PIF_VALUE: 28
PIF_VALUE: 24
PIF_VALUE: 24
PIF_VALUE: 22
PIF_VALUE: 27
PIF_VALUE: 23
PIF_VALUE: 23
PIF_VALUE: 24
PIF_VALUE: 26
PIF_VALUE: 22
PIF_VALUE: 24
PIF_VALUE: 25
PIF_VALUE: 22
PIF_VALUE: 25
PIF_VALUE: 27
PIF_VALUE: 22
PIF_VALUE: 23
PIF_VALUE: 22
PIF_VALUE: 25
PIF_VALUE: 22
PIF_VALUE: 23
PIF_VALUE: 27
PIF_VALUE: 25
PIF_VALUE: 25
PIF_VALUE: 22
PIF_VALUE: 22
PIF_VALUE: 25
PIF_VALUE: 20

## 2023-03-19 ASSESSMENT — PAIN SCALES - GENERAL
PAINLEVEL_OUTOF10: 0
PAINLEVEL_OUTOF10: 9

## 2023-03-19 ASSESSMENT — PAIN - FUNCTIONAL ASSESSMENT: PAIN_FUNCTIONAL_ASSESSMENT: PREVENTS OR INTERFERES WITH MANY ACTIVE NOT PASSIVE ACTIVITIES

## 2023-03-19 ASSESSMENT — PAIN DESCRIPTION - ONSET: ONSET: AWAKENED FROM SLEEP

## 2023-03-19 ASSESSMENT — PAIN DESCRIPTION - FREQUENCY: FREQUENCY: CONTINUOUS

## 2023-03-19 ASSESSMENT — PAIN DESCRIPTION - DESCRIPTORS: DESCRIPTORS: ACHING

## 2023-03-19 ASSESSMENT — PAIN DESCRIPTION - LOCATION: LOCATION: CHEST

## 2023-03-19 ASSESSMENT — PAIN DESCRIPTION - ORIENTATION: ORIENTATION: MID

## 2023-03-19 ASSESSMENT — PAIN DESCRIPTION - PAIN TYPE: TYPE: OTHER (COMMENT)

## 2023-03-19 NOTE — RT PROTOCOL NOTE
Ventilator check complete; patient has a #7.5 ET tube secured at the 26 at the teeth. Patient is sedated. Patient is not able to follow commands. Breath sounds are clear. Trachea is midline, negative for subcutaneous air, and chest excursion is symmetrical. Patient is also negative for cyanosis and is negative for pitting edema. Pt  diana be treated with diuretic (infiltrate on am PCXR). All alarms are set and audible. Resuscitation bag and mask are at the head of the bed. Ventilator Settings  Mode FIO2 Rate Tidal Volume Pressure PEEP I:E Ratio   Simv/VC+ . 35 16 550cc 15 8 1:2.9      Peak airway pressure:   26 bpm   Minute ventilation:   8.9 l/m      Marilou Mcguire, RRT/RCP

## 2023-03-19 NOTE — PROGRESS NOTES
Patient's temperature up to 101.8 degree despite tylenol given x2 doses on this shift. Cooling blanket applied at this time.

## 2023-03-19 NOTE — PROGRESS NOTES
IntraVENous Q6H    propofol injection  5-50 mcg/kg/min IntraVENous Continuous    fentaNYL (SUBLIMAZE) injection 50 mcg  50 mcg IntraVENous Q1H PRN    midazolam (VERSED) injection 2 mg  2 mg IntraVENous Q1H PRN    norepinephrine (LEVOPHED) 16 mg in sodium chloride 0.9 % 250 mL infusion  1-30 mcg/min IntraVENous Continuous    pantoprazole (PROTONIX) 40 mg in sodium chloride (PF) 0.9 % 10 mL injection  40 mg IntraVENous Daily    glucose chewable tablet 16 g  4 tablet Oral PRN    dextrose bolus 10% 125 mL  125 mL IntraVENous PRN    Or    dextrose bolus 10% 250 mL  250 mL IntraVENous PRN    glucagon (rDNA) injection 1 mg  1 mg SubCUTAneous PRN    dextrose 10 % infusion   IntraVENous Continuous PRN    fenofibrate (TRIGLIDE) tablet 160 mg  160 mg Oral Daily    insulin lispro (HUMALOG) injection vial 0-8 Units  0-8 Units SubCUTAneous TID WC    insulin lispro (HUMALOG) injection vial 0-4 Units  0-4 Units SubCUTAneous Nightly    sodium chloride flush 0.9 % injection 5-40 mL  5-40 mL IntraVENous 2 times per day    sodium chloride flush 0.9 % injection 5-40 mL  5-40 mL IntraVENous PRN    0.9 % sodium chloride infusion   IntraVENous PRN    ondansetron (ZOFRAN-ODT) disintegrating tablet 4 mg  4 mg Oral Q8H PRN    Or    ondansetron (ZOFRAN) injection 4 mg  4 mg IntraVENous Q6H PRN    polyethylene glycol (GLYCOLAX) packet 17 g  17 g Oral Daily PRN    nitroGLYCERIN (NITROSTAT) SL tablet 0.4 mg  0.4 mg SubLINGual Q5 Min PRN    potassium chloride (KLOR-CON M) extended release tablet 40 mEq  40 mEq Oral PRN    Or    potassium bicarb-citric acid (EFFER-K) effervescent tablet 40 mEq  40 mEq Oral PRN    Or    potassium chloride 10 mEq/100 mL IVPB (Peripheral Line)  10 mEq IntraVENous PRN    magnesium sulfate 2000 mg in 50 mL IVPB premix  2,000 mg IntraVENous PRN    aluminum & magnesium hydroxide-simethicone (MAALOX) 200-200-20 MG/5ML suspension 30 mL  30 mL Oral Q6H PRN    0.9 % sodium chloride infusion   IntraVENous Continuous EXAM  GEN : intubated, no distress, laying in bed  HEENT: anicteric sclerae, normocephalic, ETT in place  Neck - trachea midline, atraumatic  Lung - equal chest rise, no audible wheezings  Ext - no cyanosis, mild edema  Neurologic - unable to obtain due to intubation, no seizure activity  Skin - no rashes, no purpura   Psychiatric: unable to obtain due to intubation    Recent Labs     03/18/23  0118 03/18/23  0503 03/19/23  0322   WBC 6.6 6.2 5.8   HGB 9.3* 9.2* 8.1*   HCT 28.5* 27.8* 25.5*   * 141* 116*          Recent Labs     03/17/23  1534 03/17/23 2057 03/18/23  0118 03/18/23  0503 03/19/23  0322     --   --  143 145*   K 4.4   < > 4.2 4.1 4.2     --   --  114* 117*   CO2 23  --   --  25 27   BUN 41*  --   --  40* 38*   CREATININE 2.80*  --   --  3.50* 2.40*   GLUCOSE 334*  --   --  99 103*   CALCIUM 8.4  --   --  8.0* 7.9*    < > = values in this interval not displayed. Lab Results   Component Value Date    CALCIUM 7.9 (L) 03/19/2023     Lab Results   Component Value Date    LABALBU 2.6 (L) 03/16/2023         Assessment and Plan:   ANNMARIE on CKD 3b  - ANNMARIE consistent with ATN from cardiac arrest and CABG. Contrast exposure from cardiac cath earlier in the admission can be playing a role as well. - for now, continue supportive care. He remains non oliguric.  No indication for dialysis at this time as renal function is improving.  - lasix OK prn    DM2 - will need to stop Metformin (home med, not being given inpatient) if eGFR < 30    Hypernatremia - avoid normal saline or high sodium concentration infusions    ACS / 2 cardiac arrests / CABG x 4 / Carlos Garcia MD  [facility] Nephrology

## 2023-03-19 NOTE — PROGRESS NOTES
It is with great benny we pray for your family today:        \" If you trust him to save your soul,  Why would you not also,trust him to care for your body and life? He has never refused to carry your burdens; He has never fainted under their weight. So then, be finished with anxious worry, and leave all your concerns behind,  Placing them in the hand of a gracious God. \"              Faithful God,    The weight of my illness is more than I can bear. I need your help. I place the full weight of my burdens in your loving care. Thank you for being so faithful to me.     Amen

## 2023-03-19 NOTE — PROGRESS NOTES
Ventilator check complete; patient has a #7.5 ET tube secured at the 26 at the teeth. Patient is sedated. Patient is not able to follow commands. Breath sounds are clear. Trachea is midline, negative for subcutaneous air, and chest excursion is symmetric. Patient is also negative for cyanosis and is negative for pitting edema. All alarms are set and audible. Resuscitation bag is at the head of the bed.       Ventilator Settings  Mode FIO2 Rate Tidal Volume Pressure PEEP I:E Ratio     PRVC/SIMV  40%   16 BPM    550 ML  15 cm H2O    8 cm H20    1:2     Peak airway pressure:   16 bpm   Minute ventilation:   9.1 l/m         Abdoul Bradley RCP

## 2023-03-19 NOTE — PROGRESS NOTES
daily atorvastatin 80 mg daily status post coronary artery bypass grafting  2) post VF arrest -continue amiodarone drip we will decrease to 0.5 mg/min felt to be ischemia driven  3) Impella LV support -hemoglobin is stable no heparin at this point postoperatively. Plan to wean support at noon today by 1P level every 12 hours. Goal to have the device weaned by the end of the day on Monday. Ask for stop dopamine. 4) mild fever likely postoperative fever no elevated white blood cell count cultures pending. 5) renal dysfunction -creatinine improved today we will add Lasix.   6) pulmonary edema -add IV Lasix today BMP tomorrow      Brooklyn Leong MD  3/19/2023 10:04 AM

## 2023-03-19 NOTE — PROGRESS NOTES
elevated myocardial infarction) ([facility]  Active Problems:    ANNMARIE (acute kidney injury) ([facility]    Acute pulmonary edema (HCC)    History of coronary artery stent placement    Cardiac arrest ([facility]    Acute respiratory failure with hypoxia (HCC)    Ventricular arrhythmia    Acute myocardial infarction, subendocardial infarction, initial episode of care ([facility]    Diabetes mellitus type 1.5 ([facility]    Essential hypertension    Hyperlipidemia  Resolved Problems:    * No resolved hospital problems.  *      Plan:     Diurese as tolerated  Monitor renal function  Wean Impella as tolerated  Remains critically ill but seems to be improving  Off all drips and renal function better    Jorge Hernandez MD

## 2023-03-20 ENCOUNTER — APPOINTMENT (OUTPATIENT)
Dept: GENERAL RADIOLOGY | Age: 68
DRG: 215 | End: 2023-03-20
Payer: COMMERCIAL

## 2023-03-20 LAB
ANION GAP SERPL CALC-SCNC: 2 MMOL/L (ref 2–11)
ARTERIAL PATENCY WRIST A: ABNORMAL
ARTERIAL PATENCY WRIST A: POSITIVE
BACTERIA SPEC CULT: NORMAL
BASE EXCESS BLD CALC-SCNC: 1.2 MMOL/L
BASE EXCESS BLDV CALC-SCNC: 0 MMOL/L
BDY SITE: ABNORMAL
BDY SITE: ABNORMAL
BUN SERPL-MCNC: 36 MG/DL (ref 8–23)
CALCIUM SERPL-MCNC: 8 MG/DL (ref 8.3–10.4)
CHLORIDE SERPL-SCNC: 119 MMOL/L (ref 101–110)
CO2 SERPL-SCNC: 26 MMOL/L (ref 21–32)
CREAT SERPL-MCNC: 1.5 MG/DL (ref 0.8–1.5)
EKG ATRIAL RATE: 99 BPM
EKG DIAGNOSIS: NORMAL
EKG P AXIS: 73 DEGREES
EKG P-R INTERVAL: 176 MS
EKG Q-T INTERVAL: 368 MS
EKG QRS DURATION: 90 MS
EKG QTC CALCULATION (BAZETT): 472 MS
EKG R AXIS: -14 DEGREES
EKG T AXIS: 63 DEGREES
EKG VENTRICULAR RATE: 99 BPM
ERYTHROCYTE [DISTWIDTH] IN BLOOD BY AUTOMATED COUNT: 15.7 % (ref 11.9–14.6)
GAS FLOW.O2 O2 DELIVERY SYS: ABNORMAL
GAS FLOW.O2 O2 DELIVERY SYS: ABNORMAL
GLUCOSE BLD STRIP.AUTO-MCNC: 101 MG/DL (ref 65–100)
GLUCOSE BLD STRIP.AUTO-MCNC: 101 MG/DL (ref 65–100)
GLUCOSE BLD STRIP.AUTO-MCNC: 102 MG/DL (ref 65–100)
GLUCOSE BLD STRIP.AUTO-MCNC: 102 MG/DL (ref 65–100)
GLUCOSE BLD STRIP.AUTO-MCNC: 103 MG/DL (ref 65–100)
GLUCOSE BLD STRIP.AUTO-MCNC: 103 MG/DL (ref 65–100)
GLUCOSE BLD STRIP.AUTO-MCNC: 104 MG/DL (ref 65–100)
GLUCOSE BLD STRIP.AUTO-MCNC: 105 MG/DL (ref 65–100)
GLUCOSE BLD STRIP.AUTO-MCNC: 107 MG/DL (ref 65–100)
GLUCOSE BLD STRIP.AUTO-MCNC: 108 MG/DL (ref 65–100)
GLUCOSE BLD STRIP.AUTO-MCNC: 109 MG/DL (ref 65–100)
GLUCOSE BLD STRIP.AUTO-MCNC: 110 MG/DL (ref 65–100)
GLUCOSE BLD STRIP.AUTO-MCNC: 111 MG/DL (ref 65–100)
GLUCOSE BLD STRIP.AUTO-MCNC: 114 MG/DL (ref 65–100)
GLUCOSE BLD STRIP.AUTO-MCNC: 119 MG/DL (ref 65–100)
GLUCOSE BLD STRIP.AUTO-MCNC: 122 MG/DL (ref 65–100)
GLUCOSE BLD STRIP.AUTO-MCNC: 122 MG/DL (ref 65–100)
GLUCOSE BLD STRIP.AUTO-MCNC: 124 MG/DL (ref 65–100)
GLUCOSE BLD STRIP.AUTO-MCNC: 127 MG/DL (ref 65–100)
GLUCOSE SERPL-MCNC: 103 MG/DL (ref 65–100)
HCO3 BLD-SCNC: 24.8 MMOL/L (ref 22–26)
HCO3 BLDV-SCNC: 24 MMOL/L (ref 23–28)
HCT VFR BLD AUTO: 26.4 % (ref 41.1–50.3)
HGB BLD-MCNC: 8.3 G/DL (ref 13.6–17.2)
IPAP/PIP/HIGH PEEP: 25
MAGNESIUM SERPL-MCNC: 2.7 MG/DL (ref 1.8–2.4)
MCH RBC QN AUTO: 28.9 PG (ref 26.1–32.9)
MCHC RBC AUTO-ENTMCNC: 31.4 G/DL (ref 31.4–35)
MCV RBC AUTO: 92 FL (ref 82–102)
MM INDURATION, POC: 0 MM (ref 0–5)
MM INDURATION, POC: 0 MM (ref 0–5)
NRBC # BLD: 0 K/UL (ref 0–0.2)
O2/TOTAL GAS SETTING VFR VENT: 35 %
O2/TOTAL GAS SETTING VFR VENT: 35 %
PAW @ MEAN EXP FLOW ON VENT: 13 CMH2O
PCO2 BLD: 34 MMHG (ref 35–45)
PCO2 BLDV: 35.2 MMHG (ref 41–51)
PEEP RESPIRATORY: 8 CMH2O
PEEP RESPIRATORY: 8 CMH2O
PH BLD: 7.47 (ref 7.35–7.45)
PH BLDV: 7.44 (ref 7.32–7.42)
PLATELET # BLD AUTO: 113 K/UL (ref 150–450)
PMV BLD AUTO: 10.7 FL (ref 9.4–12.3)
PO2 BLD: 126 MMHG (ref 75–100)
PO2 BLDV: 31 MMHG
POTASSIUM SERPL-SCNC: 4.1 MMOL/L (ref 3.5–5.1)
PPD, POC: NEGATIVE
PPD, POC: NEGATIVE
PRESSURE SUPPORT SETTING VENT: 15 CMH2O
PRESSURE SUPPORT SETTING VENT: 15 CMH2O
RBC # BLD AUTO: 2.87 M/UL (ref 4.23–5.6)
RESPIRATORY RATE, POC: 16 (ref 5–40)
RESPIRATORY RATE, POC: 16 (ref 5–40)
SAO2 % BLD: 99.1 % (ref 95–98)
SAO2 % BLDV: 62.7 % (ref 65–88)
SERVICE CMNT-IMP: ABNORMAL
SERVICE CMNT-IMP: NORMAL
SODIUM SERPL-SCNC: 147 MMOL/L (ref 133–143)
SPECIMEN TYPE: ABNORMAL
SPECIMEN TYPE: ABNORMAL
VENTILATION MODE VENT: ABNORMAL
VENTILATION MODE VENT: ABNORMAL
VT SETTING VENT: 550 ML
VT SETTING VENT: 550 ML
WBC # BLD AUTO: 5.5 K/UL (ref 4.3–11.1)

## 2023-03-20 PROCEDURE — 6360000002 HC RX W HCPCS: Performed by: INTERNAL MEDICINE

## 2023-03-20 PROCEDURE — 83735 ASSAY OF MAGNESIUM: CPT

## 2023-03-20 PROCEDURE — 99291 CRITICAL CARE FIRST HOUR: CPT | Performed by: INTERNAL MEDICINE

## 2023-03-20 PROCEDURE — 94003 VENT MGMT INPAT SUBQ DAY: CPT

## 2023-03-20 PROCEDURE — 6370000000 HC RX 637 (ALT 250 FOR IP): Performed by: THORACIC SURGERY (CARDIOTHORACIC VASCULAR SURGERY)

## 2023-03-20 PROCEDURE — C9113 INJ PANTOPRAZOLE SODIUM, VIA: HCPCS | Performed by: INTERNAL MEDICINE

## 2023-03-20 PROCEDURE — 80048 BASIC METABOLIC PNL TOTAL CA: CPT

## 2023-03-20 PROCEDURE — 82803 BLOOD GASES ANY COMBINATION: CPT

## 2023-03-20 PROCEDURE — 2580000003 HC RX 258: Performed by: NURSE PRACTITIONER

## 2023-03-20 PROCEDURE — 85027 COMPLETE CBC AUTOMATED: CPT

## 2023-03-20 PROCEDURE — A4216 STERILE WATER/SALINE, 10 ML: HCPCS | Performed by: INTERNAL MEDICINE

## 2023-03-20 PROCEDURE — 71045 X-RAY EXAM CHEST 1 VIEW: CPT

## 2023-03-20 PROCEDURE — 2580000003 HC RX 258: Performed by: INTERNAL MEDICINE

## 2023-03-20 PROCEDURE — 36592 COLLECT BLOOD FROM PICC: CPT

## 2023-03-20 PROCEDURE — 6370000000 HC RX 637 (ALT 250 FOR IP): Performed by: INTERNAL MEDICINE

## 2023-03-20 PROCEDURE — 36430 TRANSFUSION BLD/BLD COMPNT: CPT

## 2023-03-20 PROCEDURE — 86923 COMPATIBILITY TEST ELECTRIC: CPT

## 2023-03-20 PROCEDURE — 82962 GLUCOSE BLOOD TEST: CPT

## 2023-03-20 PROCEDURE — 86900 BLOOD TYPING SEROLOGIC ABO: CPT

## 2023-03-20 PROCEDURE — 37799 UNLISTED PX VASCULAR SURGERY: CPT

## 2023-03-20 PROCEDURE — 6360000002 HC RX W HCPCS: Performed by: THORACIC SURGERY (CARDIOTHORACIC VASCULAR SURGERY)

## 2023-03-20 PROCEDURE — 2500000003 HC RX 250 WO HCPCS: Performed by: THORACIC SURGERY (CARDIOTHORACIC VASCULAR SURGERY)

## 2023-03-20 PROCEDURE — 2100000000 HC CCU R&B

## 2023-03-20 PROCEDURE — P9016 RBC LEUKOCYTES REDUCED: HCPCS

## 2023-03-20 PROCEDURE — 6370000000 HC RX 637 (ALT 250 FOR IP): Performed by: NURSE PRACTITIONER

## 2023-03-20 PROCEDURE — 2580000003 HC RX 258: Performed by: THORACIC SURGERY (CARDIOTHORACIC VASCULAR SURGERY)

## 2023-03-20 PROCEDURE — 93005 ELECTROCARDIOGRAM TRACING: CPT | Performed by: THORACIC SURGERY (CARDIOTHORACIC VASCULAR SURGERY)

## 2023-03-20 PROCEDURE — 6370000000 HC RX 637 (ALT 250 FOR IP): Performed by: PHYSICIAN ASSISTANT

## 2023-03-20 RX ORDER — SODIUM CHLORIDE 9 MG/ML
INJECTION, SOLUTION INTRAVENOUS PRN
Status: DISCONTINUED | OUTPATIENT
Start: 2023-03-20 | End: 2023-03-24 | Stop reason: SDUPTHER

## 2023-03-20 RX ADMIN — FENTANYL CITRATE 100 MCG/HR: 50 INJECTION, SOLUTION INTRAMUSCULAR; INTRAVENOUS at 13:32

## 2023-03-20 RX ADMIN — MIDAZOLAM 5 MG/HR: 5 INJECTION INTRAMUSCULAR; INTRAVENOUS at 04:11

## 2023-03-20 RX ADMIN — SODIUM CHLORIDE, PRESERVATIVE FREE 10 ML: 5 INJECTION INTRAVENOUS at 08:16

## 2023-03-20 RX ADMIN — ACETAMINOPHEN 650 MG: 325 TABLET, FILM COATED ORAL at 08:35

## 2023-03-20 RX ADMIN — FAMOTIDINE 20 MG: 20 TABLET, FILM COATED ORAL at 08:14

## 2023-03-20 RX ADMIN — FUROSEMIDE 40 MG: 10 INJECTION, SOLUTION INTRAMUSCULAR; INTRAVENOUS at 08:15

## 2023-03-20 RX ADMIN — METOPROLOL TARTRATE 12.5 MG: 25 TABLET, FILM COATED ORAL at 08:14

## 2023-03-20 RX ADMIN — Medication 1 AMPULE: at 20:31

## 2023-03-20 RX ADMIN — METOPROLOL TARTRATE 25 MG: 25 TABLET, FILM COATED ORAL at 18:38

## 2023-03-20 RX ADMIN — CHLORHEXIDINE GLUCONATE 10 ML: 1.2 SOLUTION ORAL at 20:31

## 2023-03-20 RX ADMIN — DEXTROSE MONOHYDRATE, SODIUM CHLORIDE, AND POTASSIUM CHLORIDE: 50; 4.5; 1.49 INJECTION, SOLUTION INTRAVENOUS at 20:45

## 2023-03-20 RX ADMIN — LEVOTHYROXINE SODIUM 175 MCG: 50 TABLET ORAL at 06:27

## 2023-03-20 RX ADMIN — FUROSEMIDE 40 MG: 10 INJECTION, SOLUTION INTRAMUSCULAR; INTRAVENOUS at 20:41

## 2023-03-20 RX ADMIN — SODIUM CHLORIDE, PRESERVATIVE FREE 10 ML: 5 INJECTION INTRAVENOUS at 20:31

## 2023-03-20 RX ADMIN — SODIUM CHLORIDE, PRESERVATIVE FREE 10 ML: 5 INJECTION INTRAVENOUS at 08:36

## 2023-03-20 RX ADMIN — ATORVASTATIN CALCIUM 80 MG: 80 TABLET, FILM COATED ORAL at 20:31

## 2023-03-20 RX ADMIN — NITROGLYCERIN 10 MCG/MIN: 20 INJECTION INTRAVENOUS at 23:43

## 2023-03-20 RX ADMIN — PANTOPRAZOLE SODIUM 40 MG: 40 INJECTION, POWDER, LYOPHILIZED, FOR SOLUTION INTRAVENOUS at 08:15

## 2023-03-20 RX ADMIN — ALLOPURINOL 100 MG: 100 TABLET ORAL at 08:14

## 2023-03-20 RX ADMIN — ASPIRIN 81 MG: 81 TABLET ORAL at 08:14

## 2023-03-20 RX ADMIN — INSULIN GLARGINE 5 UNITS: 100 INJECTION, SOLUTION SUBCUTANEOUS at 08:36

## 2023-03-20 RX ADMIN — FENTANYL CITRATE 125 MCG/HR: 50 INJECTION, SOLUTION INTRAMUSCULAR; INTRAVENOUS at 03:51

## 2023-03-20 RX ADMIN — CHLORHEXIDINE GLUCONATE 10 ML: 1.2 SOLUTION ORAL at 08:16

## 2023-03-20 RX ADMIN — SODIUM BICARBONATE: 84 INJECTION, SOLUTION INTRAVENOUS at 21:47

## 2023-03-20 RX ADMIN — FENTANYL CITRATE 150 MCG/HR: 50 INJECTION, SOLUTION INTRAMUSCULAR; INTRAVENOUS at 22:08

## 2023-03-20 RX ADMIN — Medication 1 AMPULE: at 08:13

## 2023-03-20 ASSESSMENT — PULMONARY FUNCTION TESTS
PIF_VALUE: 31
PIF_VALUE: 34
PIF_VALUE: 28
PIF_VALUE: 31
PIF_VALUE: 30
PIF_VALUE: 33
PIF_VALUE: 28
PIF_VALUE: 29
PIF_VALUE: 26
PIF_VALUE: 27
PIF_VALUE: 28
PIF_VALUE: 26
PIF_VALUE: 33
PIF_VALUE: 28
PIF_VALUE: 29
PIF_VALUE: 30
PIF_VALUE: 29
PIF_VALUE: 30
PIF_VALUE: 33
PIF_VALUE: 31
PIF_VALUE: 31
PIF_VALUE: 30
PIF_VALUE: 29
PIF_VALUE: 31
PIF_VALUE: 30
PIF_VALUE: 31
PIF_VALUE: 29
PIF_VALUE: 28
PIF_VALUE: 29
PIF_VALUE: 28
PIF_VALUE: 26
PIF_VALUE: 32
PIF_VALUE: 33
PIF_VALUE: 28
PIF_VALUE: 29
PIF_VALUE: 31
PIF_VALUE: 28
PIF_VALUE: 25
PIF_VALUE: 31
PIF_VALUE: 29
PIF_VALUE: 31
PIF_VALUE: 31
PIF_VALUE: 28
PIF_VALUE: 29
PIF_VALUE: 30
PIF_VALUE: 33
PIF_VALUE: 31
PIF_VALUE: 25
PIF_VALUE: 28
PIF_VALUE: 31
PIF_VALUE: 29
PIF_VALUE: 31
PIF_VALUE: 30
PIF_VALUE: 30
PIF_VALUE: 32
PIF_VALUE: 28
PIF_VALUE: 29
PIF_VALUE: 28
PIF_VALUE: 30
PIF_VALUE: 29
PIF_VALUE: 25
PIF_VALUE: 29
PIF_VALUE: 28
PIF_VALUE: 25
PIF_VALUE: 31
PIF_VALUE: 35
PIF_VALUE: 29
PIF_VALUE: 30
PIF_VALUE: 29
PIF_VALUE: 25
PIF_VALUE: 28
PIF_VALUE: 26
PIF_VALUE: 29
PIF_VALUE: 25
PIF_VALUE: 32
PIF_VALUE: 33
PIF_VALUE: 32
PIF_VALUE: 32
PIF_VALUE: 29
PIF_VALUE: 29
PIF_VALUE: 36
PIF_VALUE: 23
PIF_VALUE: 19
PIF_VALUE: 27
PIF_VALUE: 25
PIF_VALUE: 29
PIF_VALUE: 28
PIF_VALUE: 32
PIF_VALUE: 33
PIF_VALUE: 30
PIF_VALUE: 25
PIF_VALUE: 32
PIF_VALUE: 31
PIF_VALUE: 32
PIF_VALUE: 30
PIF_VALUE: 25
PIF_VALUE: 31
PIF_VALUE: 28
PIF_VALUE: 26
PIF_VALUE: 28

## 2023-03-20 ASSESSMENT — PAIN SCALES - GENERAL
PAINLEVEL_OUTOF10: 0

## 2023-03-20 NOTE — PROGRESS NOTES
PT was in bed and unresponsive d/t current medical regime. [facility] offered comforting spiritual presence. [facility] talked gently to PT. RN arrived to check on PT. PT is Congregation. [facility] prayed for PT, PT's Family, and Staff. Rev. Christina Stover M.Div.

## 2023-03-20 NOTE — PROGRESS NOTES
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 Diuresis with lasix at 40 mg q 12h . With decrease in preload his hemodynamics and CO may improve thus improving renal perfusion and function. Diabetes mellitus type 1.5 ([facility] on insulin drip    SSI. Glucose controlled      Full Code    More than 50% of the time documented was spent in face-to-face contact with the patient and in the care of the patient on the floor/unit where the patient is located.   The patient is critically ill with respiratory failure, circulatory failure and requires high complexity decision making for assessment and support including frequent ventilator adjustment , frequent evaluation and titration of therapies , application of advanced monitoring technologies and extensive interpretation of multiple databases    Cumulative critical care time devoted to patient critical care services by me for day of service -40 min     Naeem Abernathy MD

## 2023-03-20 NOTE — PROGRESS NOTES
Physical Therapy Note:    Attempted to see patient this AM for physical therapy evaluation session. Patient sedated on vent, not appropriate for PT yet per RN. Will hold today. Will follow and re-attempt as schedule permits/patient available.  Thank you,    HOSSEIN Gilbert, PT     [facility]

## 2023-03-20 NOTE — DIABETES MGMT
Patient s/p CABG. Per chart review patient on vent. Blood glucose ranged 103-137 yesterday with patient on insulin gtt and receiving Lantus 5 units. Patient insulin gtt averaging 2-4 units per hour. Blood glucose this morning was 122. Creatinine 1.50. GFR 51. Reviewed patient current regimen: Lantus 5 units daily and insulin gtt. Patient NPO. Patient has Humalog prandial insulin and correctional insulin ordered. Would recommend provider consider d/c these while on insulin gtt. Glycemic control stable on current regimen. Will follow along loosely. Update at 12: spoke with provider new orders received to d/c Humalog correctional insulin and prandial insulin as patient on insulin gtt.

## 2023-03-20 NOTE — PROGRESS NOTES
[facility] CARDIOLOGY PROGRESS NOTE           3/20/2023 3:36 PM    Admit Date: 3/15/2023    Subjective:   Patient is hemodynamically stable remains intubated and sedated. ROS:  UNABLE TO OBTAIN DUE TO INABILITY OF PATIENT TO COMMUNICATE SECONDARY TO CURRENT INTUBATION AND NEED FOR MECHANICAL VENTILATION. Objective:      Vitals:    03/20/23 1430 03/20/23 1445 03/20/23 1500 03/20/23 1515   BP: 128/72  125/74    Pulse: 82 81 82 82   Resp: 16 16 16 16   Temp:   99 °F (37.2 °C)    TempSrc:   Core    SpO2: 100% 100% 100% 100%   Weight:       Height:           Physical Exam:  General-Intubated. Neck- supple, no JVD  CV- regular rate and rhythm no MRG  Lung- clear bilaterally  Abd- soft, nontender, nondistended  Ext- no edema bilaterally. Skin- warm and dry  Psychiatric:  Unable to accurately assess due to intubated and sedated status. Neurologic:  Unable to accurately assess due to intubated and sedated status. Data Review:   Recent Labs     03/19/23  0322 03/19/23  1935 03/20/23  0220 03/20/23 0223   *  --   --  147*   K 4.2 4.3  --  4.1   MG 2.8* 2.8*  --  2.7*   BUN 38*  --   --  36*   WBC 5.8  --  5.5  --    HGB 8.1*  --  8.3*  --    HCT 25.5*  --  26.4*  --    *  --  113*  --        TELEMETRY:  SR    Assessment/Plan:     Principal Problem:    NSTEMI (non-ST elevated myocardial infarction) ([facility]  Plan: Patient with complex coronary artery disease now status post coronary bypass grafting. Patient with intraoperative placement of 5.5 Impella. Actively weaning today. Patient is stable at P4. If remains hemodynamically stable additional 24 hours recommend surgical explantation tomorrow. Active Problems:    [facility]  Plan: Acute kidney injury has resolved post surgery. Monitor closely. Acute pulmonary edema (HCC)  Plan: Gentle diuresis likely appropriate.     History of coronary artery stent placement  Plan: Consider dual antiplatelet

## 2023-03-20 NOTE — PROGRESS NOTES
Dr. Govind Pichardo at bedside. Orders received for weaning Impella. Decreased flow to P4. If tolerating continue to wean going to P3 at 2000 then P2 at 0000.  If after 2 hours pt tolerating weaning return to P4 until reevaluation in AM.

## 2023-03-20 NOTE — PROGRESS NOTES
mcg/kg/min IntraVENous Continuous    fentaNYL (SUBLIMAZE) injection 50 mcg  50 mcg IntraVENous Q1H PRN    midazolam (VERSED) injection 2 mg  2 mg IntraVENous Q1H PRN    norepinephrine (LEVOPHED) 16 mg in sodium chloride 0.9 % 250 mL infusion  1-30 mcg/min IntraVENous Continuous    pantoprazole (PROTONIX) 40 mg in sodium chloride (PF) 0.9 % 10 mL injection  40 mg IntraVENous Daily    glucose chewable tablet 16 g  4 tablet Oral PRN    dextrose bolus 10% 125 mL  125 mL IntraVENous PRN    Or    dextrose bolus 10% 250 mL  250 mL IntraVENous PRN    glucagon (rDNA) injection 1 mg  1 mg SubCUTAneous PRN    dextrose 10 % infusion   IntraVENous Continuous PRN    [Held by provider] fenofibrate (TRIGLIDE) tablet 160 mg  160 mg Oral Daily    insulin lispro (HUMALOG) injection vial 0-8 Units  0-8 Units SubCUTAneous TID WC    insulin lispro (HUMALOG) injection vial 0-4 Units  0-4 Units SubCUTAneous Nightly    sodium chloride flush 0.9 % injection 5-40 mL  5-40 mL IntraVENous 2 times per day    sodium chloride flush 0.9 % injection 5-40 mL  5-40 mL IntraVENous PRN    0.9 % sodium chloride infusion   IntraVENous PRN    ondansetron (ZOFRAN-ODT) disintegrating tablet 4 mg  4 mg Oral Q8H PRN    Or    ondansetron (ZOFRAN) injection 4 mg  4 mg IntraVENous Q6H PRN    polyethylene glycol (GLYCOLAX) packet 17 g  17 g Oral Daily PRN    nitroGLYCERIN (NITROSTAT) SL tablet 0.4 mg  0.4 mg SubLINGual Q5 Min PRN    potassium chloride (KLOR-CON M) extended release tablet 40 mEq  40 mEq Oral PRN    Or    potassium bicarb-citric acid (EFFER-K) effervescent tablet 40 mEq  40 mEq Oral PRN    Or    potassium chloride 10 mEq/100 mL IVPB (Peripheral Line)  10 mEq IntraVENous PRN    magnesium sulfate 2000 mg in 50 mL IVPB premix  2,000 mg IntraVENous PRN    aluminum & magnesium hydroxide-simethicone (MAALOX) 200-200-20 MG/5ML suspension 30 mL  30 mL Oral Q6H PRN    0.9 % sodium chloride infusion   IntraVENous Continuous       EXAM  GEN : intubated, no distress, laying in bed  HEENT: anicteric sclerae, normocephalic, ETT in place  Neck - trachea midline, atraumatic  Lung - equal chest rise, decreased BS   Ext - no cyanosis, mild edema  Neurologic - unable to obtain due to intubation, no seizure activity  Skin - no rashes, no purpura   Psychiatric: unable to obtain due to intubation    Recent Labs     03/18/23  0503 03/19/23  0322 03/20/23 0220   WBC 6.2 5.8 5.5   HGB 9.2* 8.1* 8.3*   HCT 27.8* 25.5* 26.4*   * 116* 113*          Recent Labs     03/18/23  0503 03/19/23  0322 03/19/23  1935 03/20/23 0223    145*  --  147*   K 4.1 4.2 4.3 4.1   * 117*  --  119*   CO2 25 27  --  26   BUN 40* 38*  --  36*   CREATININE 3.50* 2.40*  --  1.50   GLUCOSE 99 103*  --  103*   CALCIUM 8.0* 7.9*  --  8.0*           Lab Results   Component Value Date    CALCIUM 8.0 (L) 03/20/2023     Lab Results   Component Value Date    LABALBU 2.6 (L) 03/16/2023         Assessment and Plan:   ANNMARIE on CKD 3b  - ANNMARIE consistent with ATN from cardiac arrest and CABG. Contrast exposure   non oliguric. Improving  Pulmonary edema;  Lasix      DM2 -     Hypernatremia - avoid normal saline or high sodium concentration infusions    ACS / 2 cardiac arrests / CABG x 4 / Impella    Fever    Thrombocytopenia     Pearley Goldberg, MD  [facility] Nephrology

## 2023-03-20 NOTE — PROGRESS NOTES
Ventilator check complete; patient has a #8. 0 ET tube secured at the 26 at the lip. Patient is  sedated. Patient is  able to follow commands. Breath sounds are diminished. Trachea is midline, Negative for subcutaneous air, and chest excursion is symmetric. Patient is also Negative for cyanosis and is Negative for pitting edema. All alarms are set and audible. Resuscitation bag is  at the head of the bed. Ventilator Settings  Mode FIO2 Rate Tidal Volume Pressure PEEP I:E Ratio   SIMV/PRVC  35 %   16   500 15 cm H2O    8 1:2.9      Peak airway pressure:     Minute ventilation:       ABG: No results for input(s): PH, PCO2, PO2, HCO3 in the last 72 hours.       Tian Cole RCP

## 2023-03-20 NOTE — PROGRESS NOTES
Pressure Support 15 cmH2O    POC Kevon's Test NOT APPLICABLE      Respiratory Rate 16      Site DRAWN FROM ARTERIAL LINE      Specimen type: ARTERIAL      Performed by: Guerda     Respiratory Comment: Ve9.0    CBC    Collection Time: 03/20/23  2:20 AM   Result Value Ref Range    WBC 5.5 4.3 - 11.1 K/uL    RBC 2.87 (L) 4.23 - 5.6 M/uL    Hemoglobin 8.3 (L) 13.6 - 17.2 g/dL    Hematocrit 26.4 (L) 41.1 - 50.3 %    MCV 92.0 82 - 102 FL    MCH 28.9 26.1 - 32.9 PG    MCHC 31.4 31.4 - 35.0 g/dL    RDW 15.7 (H) 11.9 - 14.6 %    Platelets 971 (L) 780 - 450 K/uL    MPV 10.7 9.4 - 12.3 FL    nRBC 0.00 0.0 - 0.2 K/uL   Venous Blood Gas, POC    Collection Time: 03/20/23  2:22 AM   Result Value Ref Range    DEVICE ADULT VENT      FIO2 35 %    PH, VENOUS (POC) 7.44 (H) 7.32 - 7.42      PCO2, [place], POC 35.2 (L) 41 - 51 MMHG    PO2, VENOUS (POC) 31 mmHg    HCO3, Venous 24.0 23 - 28 MMOL/L    SO2, VENOUS (POC) 62.7 (L) 65 - 88 %    BASE EXCESS, VENOUS (POC) 0.0 mmol/L    Mode SIMV      POC TIDAL VOLUME 550 ml    POC PEEP 8 cmH2O    POC Pressure Support 15 cmH2O    POC Kevon's Test Positive      Respiratory Rate 16      Site CENTRAL LINE      Specimen type: MIXED VENOUS      Performed by: Guerda     Critical Value Read Back JOHNATHON     Respiratory Comment: XC8.5    Basic Metabolic Panel    Collection Time: 03/20/23  2:23 AM   Result Value Ref Range    Sodium 147 (H) 133 - 143 mmol/L    Potassium 4.1 3.5 - 5.1 mmol/L    Chloride 119 (H) 101 - 110 mmol/L    CO2 26 21 - 32 mmol/L    Anion Gap 2 2 - 11 mmol/L    Glucose 103 (H) 65 - 100 mg/dL    BUN 36 (H) 8 - 23 MG/DL    Creatinine 1.50 0.8 - 1.5 MG/DL    Est, Glom Filt Rate 51 (L) >60 ml/min/1.73m2    Calcium 8.0 (L) 8.3 - 10.4 MG/DL   POCT Glucose    Collection Time: 03/20/23  3:25 AM   Result Value Ref Range    POC Glucose 103 (H) 65 - 100 mg/dL    Performed by: Tammy    POCT Glucose    Collection Time: 03/20/23  4:34 AM   Result Value Ref Range    POC Glucose 119 (H) 65 - 100 mg/dL    Performed by: Tammy    POCT Glucose    Collection Time: 03/20/23  5:25 AM   Result Value Ref Range    POC Glucose 127 (H) 65 - 100 mg/dL    Performed by: Tammy    POCT Glucose    Collection Time: 03/20/23  6:24 AM   Result Value Ref Range    POC Glucose 124 (H) 65 - 100 mg/dL    Performed by: Storm [place]        Assessment:     Principal Problem:    NSTEMI (non-ST elevated myocardial infarction) ([facility]  Active Problems:    ANNMARIE (acute kidney injury) ([facility]    Acute pulmonary edema (HCC)    History of coronary artery stent placement    [facility]    Acute respiratory failure with hypoxia (HCC)    Ventricular arrhythmia    Acute myocardial infarction, subendocardial infarction, initial episode of care ([facility]    Diabetes mellitus type 1.5 ([facility]    Essential hypertension    Hyperlipidemia  Resolved Problems:    * No resolved hospital problems.  *      Plan/Recommendations/Medical Decision Making:   Off all gtts, weaning Impella, plan explant tomorrow    See orders

## 2023-03-20 NOTE — CARE COORDINATION
CM continues to follow for discharge planning and/or CM needs. Discharge plan pending clinical progress. Pt currently sedated on vent. Will continue to monitor and update as needed.

## 2023-03-21 ENCOUNTER — ANESTHESIA (OUTPATIENT)
Dept: SURGERY | Age: 68
End: 2023-03-21
Payer: COMMERCIAL

## 2023-03-21 ENCOUNTER — ANESTHESIA EVENT (OUTPATIENT)
Dept: SURGERY | Age: 68
End: 2023-03-21
Payer: COMMERCIAL

## 2023-03-21 ENCOUNTER — APPOINTMENT (OUTPATIENT)
Dept: GENERAL RADIOLOGY | Age: 68
DRG: 215 | End: 2023-03-21
Payer: COMMERCIAL

## 2023-03-21 PROBLEM — Z95.1 S/P CABG X 4: Status: ACTIVE | Noted: 2023-03-21

## 2023-03-21 PROBLEM — J90 PLEURAL EFFUSION: Status: ACTIVE | Noted: 2023-03-21

## 2023-03-21 LAB
ABO + RH BLD: NORMAL
ANION GAP SERPL CALC-SCNC: 4 MMOL/L (ref 2–11)
ARTERIAL PATENCY WRIST A: ABNORMAL
ARTERIAL PATENCY WRIST A: ABNORMAL
BACTERIA SPEC CULT: ABNORMAL
BACTERIA SPEC CULT: ABNORMAL
BASE DEFICIT BLDV-SCNC: 0.1 MMOL/L
BASE EXCESS BLD CALC-SCNC: 0.5 MMOL/L
BDY SITE: ABNORMAL
BDY SITE: ABNORMAL
BLD PROD TYP BPU: NORMAL
BLOOD BANK DISPENSE STATUS: NORMAL
BLOOD GROUP ANTIBODIES SERPL: NORMAL
BPU ID: NORMAL
BUN SERPL-MCNC: 39 MG/DL (ref 8–23)
CALCIUM SERPL-MCNC: 8 MG/DL (ref 8.3–10.4)
CHLORIDE SERPL-SCNC: 117 MMOL/L (ref 101–110)
CO2 SERPL-SCNC: 26 MMOL/L (ref 21–32)
CREAT SERPL-MCNC: 1.4 MG/DL (ref 0.8–1.5)
CROSSMATCH RESULT: NORMAL
EKG ATRIAL RATE: 86 BPM
EKG DIAGNOSIS: NORMAL
EKG P AXIS: 70 DEGREES
EKG P-R INTERVAL: 178 MS
EKG Q-T INTERVAL: 396 MS
EKG QRS DURATION: 90 MS
EKG QTC CALCULATION (BAZETT): 473 MS
EKG R AXIS: -37 DEGREES
EKG T AXIS: 59 DEGREES
EKG VENTRICULAR RATE: 86 BPM
ERYTHROCYTE [DISTWIDTH] IN BLOOD BY AUTOMATED COUNT: 15.3 % (ref 11.9–14.6)
GAS FLOW.O2 O2 DELIVERY SYS: ABNORMAL
GAS FLOW.O2 O2 DELIVERY SYS: ABNORMAL
GLUCOSE BLD STRIP.AUTO-MCNC: 104 MG/DL (ref 65–100)
GLUCOSE BLD STRIP.AUTO-MCNC: 105 MG/DL (ref 65–100)
GLUCOSE BLD STRIP.AUTO-MCNC: 107 MG/DL (ref 65–100)
GLUCOSE BLD STRIP.AUTO-MCNC: 107 MG/DL (ref 65–100)
GLUCOSE BLD STRIP.AUTO-MCNC: 109 MG/DL (ref 65–100)
GLUCOSE BLD STRIP.AUTO-MCNC: 109 MG/DL (ref 65–100)
GLUCOSE BLD STRIP.AUTO-MCNC: 110 MG/DL (ref 65–100)
GLUCOSE BLD STRIP.AUTO-MCNC: 112 MG/DL (ref 65–100)
GLUCOSE BLD STRIP.AUTO-MCNC: 160 MG/DL (ref 65–100)
GLUCOSE BLD STRIP.AUTO-MCNC: 90 MG/DL (ref 65–100)
GLUCOSE BLD STRIP.AUTO-MCNC: 93 MG/DL (ref 65–100)
GLUCOSE BLD STRIP.AUTO-MCNC: 96 MG/DL (ref 65–100)
GLUCOSE BLD STRIP.AUTO-MCNC: 97 MG/DL (ref 65–100)
GLUCOSE SERPL-MCNC: 105 MG/DL (ref 65–100)
GRAM STN SPEC: ABNORMAL
HCO3 BLD-SCNC: 24.3 MMOL/L (ref 22–26)
HCO3 BLDV-SCNC: 24.6 MMOL/L (ref 23–28)
HCT VFR BLD AUTO: 27.7 % (ref 41.1–50.3)
HGB BLD-MCNC: 9.1 G/DL (ref 13.6–17.2)
HISTORY CHECK: NORMAL
MAGNESIUM SERPL-MCNC: 2.6 MG/DL (ref 1.8–2.4)
MCH RBC QN AUTO: 30 PG (ref 26.1–32.9)
MCHC RBC AUTO-ENTMCNC: 32.9 G/DL (ref 31.4–35)
MCV RBC AUTO: 91.4 FL (ref 82–102)
NRBC # BLD: 0 K/UL (ref 0–0.2)
O2/TOTAL GAS SETTING VFR VENT: 30 %
O2/TOTAL GAS SETTING VFR VENT: 30 %
PCO2 BLD: 34.8 MMHG (ref 35–45)
PCO2 BLDV: 39.3 MMHG (ref 41–51)
PEEP RESPIRATORY: 8 CMH2O
PEEP RESPIRATORY: 8 CMH2O
PH BLD: 7.45 (ref 7.35–7.45)
PH BLDV: 7.4 (ref 7.32–7.42)
PLATELET # BLD AUTO: 152 K/UL (ref 150–450)
PMV BLD AUTO: 10.7 FL (ref 9.4–12.3)
PO2 BLD: 73 MMHG (ref 75–100)
PO2 BLDV: 37 MMHG
POTASSIUM SERPL-SCNC: 4 MMOL/L (ref 3.5–5.1)
PRESSURE SUPPORT SETTING VENT: 15 CMH2O
PRESSURE SUPPORT SETTING VENT: 15 CMH2O
RBC # BLD AUTO: 3.03 M/UL (ref 4.23–5.6)
SAO2 % BLD: 95.3 % (ref 95–98)
SAO2 % BLDV: 70.6 % (ref 65–88)
SERVICE CMNT-IMP: ABNORMAL
SERVICE CMNT-IMP: NORMAL
SODIUM SERPL-SCNC: 147 MMOL/L (ref 133–143)
SPECIMEN EXP DATE BLD: NORMAL
SPECIMEN TYPE: ABNORMAL
SPECIMEN TYPE: ABNORMAL
UNIT DIVISION: 0
VENTILATION MODE VENT: ABNORMAL
VENTILATION MODE VENT: ABNORMAL
VT SETTING VENT: 550 ML
VT SETTING VENT: 550 ML
WBC # BLD AUTO: 6.8 K/UL (ref 4.3–11.1)

## 2023-03-21 PROCEDURE — 99233 SBSQ HOSP IP/OBS HIGH 50: CPT | Performed by: INTERNAL MEDICINE

## 2023-03-21 PROCEDURE — 93005 ELECTROCARDIOGRAM TRACING: CPT | Performed by: THORACIC SURGERY (CARDIOTHORACIC VASCULAR SURGERY)

## 2023-03-21 PROCEDURE — 3700000000 HC ANESTHESIA ATTENDED CARE: Performed by: THORACIC SURGERY (CARDIOTHORACIC VASCULAR SURGERY)

## 2023-03-21 PROCEDURE — 2580000003 HC RX 258: Performed by: THORACIC SURGERY (CARDIOTHORACIC VASCULAR SURGERY)

## 2023-03-21 PROCEDURE — 6360000002 HC RX W HCPCS: Performed by: THORACIC SURGERY (CARDIOTHORACIC VASCULAR SURGERY)

## 2023-03-21 PROCEDURE — 37799 UNLISTED PX VASCULAR SURGERY: CPT

## 2023-03-21 PROCEDURE — 94003 VENT MGMT INPAT SUBQ DAY: CPT

## 2023-03-21 PROCEDURE — 36592 COLLECT BLOOD FROM PICC: CPT

## 2023-03-21 PROCEDURE — 99291 CRITICAL CARE FIRST HOUR: CPT | Performed by: INTERNAL MEDICINE

## 2023-03-21 PROCEDURE — 6370000000 HC RX 637 (ALT 250 FOR IP): Performed by: INTERNAL MEDICINE

## 2023-03-21 PROCEDURE — A4217 STERILE WATER/SALINE, 500 ML: HCPCS | Performed by: THORACIC SURGERY (CARDIOTHORACIC VASCULAR SURGERY)

## 2023-03-21 PROCEDURE — 6370000000 HC RX 637 (ALT 250 FOR IP): Performed by: NURSE PRACTITIONER

## 2023-03-21 PROCEDURE — 2500000003 HC RX 250 WO HCPCS: Performed by: THORACIC SURGERY (CARDIOTHORACIC VASCULAR SURGERY)

## 2023-03-21 PROCEDURE — 2580000003 HC RX 258: Performed by: NURSE ANESTHETIST, CERTIFIED REGISTERED

## 2023-03-21 PROCEDURE — 3600000018 HC SURGERY OHS ADDTL 15MIN: Performed by: THORACIC SURGERY (CARDIOTHORACIC VASCULAR SURGERY)

## 2023-03-21 PROCEDURE — 85027 COMPLETE CBC AUTOMATED: CPT

## 2023-03-21 PROCEDURE — 2580000003 HC RX 258: Performed by: INTERNAL MEDICINE

## 2023-03-21 PROCEDURE — 6370000000 HC RX 637 (ALT 250 FOR IP): Performed by: PHYSICIAN ASSISTANT

## 2023-03-21 PROCEDURE — 6360000002 HC RX W HCPCS: Performed by: NURSE ANESTHETIST, CERTIFIED REGISTERED

## 2023-03-21 PROCEDURE — 82803 BLOOD GASES ANY COMBINATION: CPT

## 2023-03-21 PROCEDURE — 02HA3RZ INSERTION OF SHORT-TERM EXTERNAL HEART ASSIST SYSTEM INTO HEART, PERCUTANEOUS APPROACH: ICD-10-PCS | Performed by: THORACIC SURGERY (CARDIOTHORACIC VASCULAR SURGERY)

## 2023-03-21 PROCEDURE — 3600000008 HC SURGERY OHS BASE: Performed by: THORACIC SURGERY (CARDIOTHORACIC VASCULAR SURGERY)

## 2023-03-21 PROCEDURE — 6360000002 HC RX W HCPCS: Performed by: INTERNAL MEDICINE

## 2023-03-21 PROCEDURE — 83735 ASSAY OF MAGNESIUM: CPT

## 2023-03-21 PROCEDURE — 2709999900 HC NON-CHARGEABLE SUPPLY: Performed by: THORACIC SURGERY (CARDIOTHORACIC VASCULAR SURGERY)

## 2023-03-21 PROCEDURE — 6370000000 HC RX 637 (ALT 250 FOR IP): Performed by: THORACIC SURGERY (CARDIOTHORACIC VASCULAR SURGERY)

## 2023-03-21 PROCEDURE — C9113 INJ PANTOPRAZOLE SODIUM, VIA: HCPCS | Performed by: INTERNAL MEDICINE

## 2023-03-21 PROCEDURE — 80048 BASIC METABOLIC PNL TOTAL CA: CPT

## 2023-03-21 PROCEDURE — 2500000003 HC RX 250 WO HCPCS: Performed by: NURSE ANESTHETIST, CERTIFIED REGISTERED

## 2023-03-21 PROCEDURE — 2100000000 HC CCU R&B

## 2023-03-21 PROCEDURE — 82962 GLUCOSE BLOOD TEST: CPT

## 2023-03-21 PROCEDURE — 71045 X-RAY EXAM CHEST 1 VIEW: CPT

## 2023-03-21 PROCEDURE — 2580000003 HC RX 258: Performed by: NURSE PRACTITIONER

## 2023-03-21 PROCEDURE — A4216 STERILE WATER/SALINE, 10 ML: HCPCS | Performed by: INTERNAL MEDICINE

## 2023-03-21 PROCEDURE — 3700000001 HC ADD 15 MINUTES (ANESTHESIA): Performed by: THORACIC SURGERY (CARDIOTHORACIC VASCULAR SURGERY)

## 2023-03-21 RX ORDER — INSULIN LISPRO 100 [IU]/ML
0-8 INJECTION, SOLUTION INTRAVENOUS; SUBCUTANEOUS
Status: DISCONTINUED | OUTPATIENT
Start: 2023-03-21 | End: 2023-03-22

## 2023-03-21 RX ORDER — ROCURONIUM BROMIDE 10 MG/ML
INJECTION, SOLUTION INTRAVENOUS PRN
Status: DISCONTINUED | OUTPATIENT
Start: 2023-03-21 | End: 2023-03-21 | Stop reason: SDUPTHER

## 2023-03-21 RX ORDER — INSULIN LISPRO 100 [IU]/ML
0-4 INJECTION, SOLUTION INTRAVENOUS; SUBCUTANEOUS NIGHTLY
Status: DISCONTINUED | OUTPATIENT
Start: 2023-03-21 | End: 2023-03-24 | Stop reason: ALTCHOICE

## 2023-03-21 RX ORDER — DEXMEDETOMIDINE HYDROCHLORIDE 4 UG/ML
.1-1.5 INJECTION, SOLUTION INTRAVENOUS CONTINUOUS
Status: DISCONTINUED | OUTPATIENT
Start: 2023-03-21 | End: 2023-03-24

## 2023-03-21 RX ORDER — NICARDIPINE HYDROCHLORIDE 0.1 MG/ML
INJECTION INTRAVENOUS PRN
Status: DISCONTINUED | OUTPATIENT
Start: 2023-03-21 | End: 2023-03-21 | Stop reason: SDUPTHER

## 2023-03-21 RX ORDER — SODIUM CHLORIDE 9 MG/ML
INJECTION, SOLUTION INTRAVENOUS PRN
Status: DISCONTINUED | OUTPATIENT
Start: 2023-03-21 | End: 2023-03-27 | Stop reason: HOSPADM

## 2023-03-21 RX ORDER — SODIUM CHLORIDE 9 MG/ML
INJECTION, SOLUTION INTRAVENOUS CONTINUOUS PRN
Status: DISCONTINUED | OUTPATIENT
Start: 2023-03-21 | End: 2023-03-21 | Stop reason: SDUPTHER

## 2023-03-21 RX ADMIN — SODIUM CHLORIDE, PRESERVATIVE FREE 10 ML: 5 INJECTION INTRAVENOUS at 20:27

## 2023-03-21 RX ADMIN — LEVOTHYROXINE SODIUM 175 MCG: 50 TABLET ORAL at 05:12

## 2023-03-21 RX ADMIN — INSULIN GLARGINE 5 UNITS: 100 INJECTION, SOLUTION SUBCUTANEOUS at 08:41

## 2023-03-21 RX ADMIN — FENTANYL CITRATE 100 MCG/HR: 50 INJECTION, SOLUTION INTRAMUSCULAR; INTRAVENOUS at 22:38

## 2023-03-21 RX ADMIN — ATORVASTATIN CALCIUM 80 MG: 80 TABLET, FILM COATED ORAL at 20:26

## 2023-03-21 RX ADMIN — FENTANYL CITRATE 200 MCG/HR: 50 INJECTION, SOLUTION INTRAMUSCULAR; INTRAVENOUS at 09:50

## 2023-03-21 RX ADMIN — NICARDIPINE HYDROCHLORIDE 0.4 MG: 0.1 INJECTION INTRAVENOUS at 12:42

## 2023-03-21 RX ADMIN — ASPIRIN 81 MG: 81 TABLET ORAL at 08:39

## 2023-03-21 RX ADMIN — FENTANYL CITRATE 200 MCG/HR: 50 INJECTION, SOLUTION INTRAMUSCULAR; INTRAVENOUS at 15:58

## 2023-03-21 RX ADMIN — NICARDIPINE HYDROCHLORIDE 0.6 MG: 0.1 INJECTION INTRAVENOUS at 12:47

## 2023-03-21 RX ADMIN — ALLOPURINOL 100 MG: 100 TABLET ORAL at 08:39

## 2023-03-21 RX ADMIN — SODIUM CHLORIDE 0.7 MCG/KG/HR: 9 INJECTION, SOLUTION INTRAVENOUS at 16:40

## 2023-03-21 RX ADMIN — Medication 2 G: at 13:00

## 2023-03-21 RX ADMIN — FAMOTIDINE 20 MG: 20 TABLET, FILM COATED ORAL at 08:39

## 2023-03-21 RX ADMIN — MIDAZOLAM 7 MG/HR: 5 INJECTION INTRAMUSCULAR; INTRAVENOUS at 00:25

## 2023-03-21 RX ADMIN — FENTANYL CITRATE 200 MCG/HR: 50 INJECTION, SOLUTION INTRAMUSCULAR; INTRAVENOUS at 04:27

## 2023-03-21 RX ADMIN — ROCURONIUM BROMIDE 50 MG: 50 INJECTION, SOLUTION INTRAVENOUS at 12:42

## 2023-03-21 RX ADMIN — Medication 1 AMPULE: at 20:26

## 2023-03-21 RX ADMIN — SODIUM CHLORIDE 2.8 UNITS/HR: 9 INJECTION, SOLUTION INTRAVENOUS at 07:44

## 2023-03-21 RX ADMIN — SODIUM CHLORIDE: 900 INJECTION INTRAVENOUS at 12:42

## 2023-03-21 RX ADMIN — SODIUM CHLORIDE, PRESERVATIVE FREE 10 ML: 5 INJECTION INTRAVENOUS at 08:32

## 2023-03-21 RX ADMIN — SODIUM CHLORIDE, PRESERVATIVE FREE 10 ML: 5 INJECTION INTRAVENOUS at 20:26

## 2023-03-21 RX ADMIN — SODIUM CHLORIDE, PRESERVATIVE FREE 10 ML: 5 INJECTION INTRAVENOUS at 08:33

## 2023-03-21 RX ADMIN — CHLORHEXIDINE GLUCONATE 10 ML: 1.2 SOLUTION ORAL at 08:39

## 2023-03-21 RX ADMIN — FUROSEMIDE 40 MG: 10 INJECTION, SOLUTION INTRAMUSCULAR; INTRAVENOUS at 08:31

## 2023-03-21 RX ADMIN — PHENYLEPHRINE HYDROCHLORIDE 50 MCG: 10 INJECTION INTRAVENOUS at 13:14

## 2023-03-21 RX ADMIN — PANTOPRAZOLE SODIUM 40 MG: 40 INJECTION, POWDER, LYOPHILIZED, FOR SOLUTION INTRAVENOUS at 08:31

## 2023-03-21 RX ADMIN — Medication 1 AMPULE: at 08:31

## 2023-03-21 RX ADMIN — METOPROLOL TARTRATE 25 MG: 25 TABLET, FILM COATED ORAL at 08:40

## 2023-03-21 RX ADMIN — CHLORHEXIDINE GLUCONATE 10 ML: 1.2 SOLUTION ORAL at 20:26

## 2023-03-21 ASSESSMENT — PULMONARY FUNCTION TESTS
PIF_VALUE: 23
PIF_VALUE: 23
PIF_VALUE: 27
PIF_VALUE: 29
PIF_VALUE: 27
PIF_VALUE: 31
PIF_VALUE: 21
PIF_VALUE: 32
PIF_VALUE: 23
PIF_VALUE: 32
PIF_VALUE: 31
PIF_VALUE: 26
PIF_VALUE: 28
PIF_VALUE: 27
PIF_VALUE: 29
PIF_VALUE: 28
PIF_VALUE: 30
PIF_VALUE: 29
PIF_VALUE: 28
PIF_VALUE: 21
PIF_VALUE: 21
PIF_VALUE: 24
PIF_VALUE: 28
PIF_VALUE: 27
PIF_VALUE: 26
PIF_VALUE: 30
PIF_VALUE: 23
PIF_VALUE: 26
PIF_VALUE: 28
PIF_VALUE: 28
PIF_VALUE: 27
PIF_VALUE: 22
PIF_VALUE: 29
PIF_VALUE: 27
PIF_VALUE: 28
PIF_VALUE: 28
PIF_VALUE: 26
PIF_VALUE: 28
PIF_VALUE: 28
PIF_VALUE: 24
PIF_VALUE: 30
PIF_VALUE: 26
PIF_VALUE: 24
PIF_VALUE: 27
PIF_VALUE: 28
PIF_VALUE: 26
PIF_VALUE: 29
PIF_VALUE: 29
PIF_VALUE: 28
PIF_VALUE: 24
PIF_VALUE: 30
PIF_VALUE: 24
PIF_VALUE: 29
PIF_VALUE: 28
PIF_VALUE: 26
PIF_VALUE: 26
PIF_VALUE: 28
PIF_VALUE: 23
PIF_VALUE: 28
PIF_VALUE: 28
PIF_VALUE: 30
PIF_VALUE: 28
PIF_VALUE: 28
PIF_VALUE: 30
PIF_VALUE: 23
PIF_VALUE: 27
PIF_VALUE: 27
PIF_VALUE: 28
PIF_VALUE: 23
PIF_VALUE: 26
PIF_VALUE: 28
PIF_VALUE: 27
PIF_VALUE: 27
PIF_VALUE: 23
PIF_VALUE: 26
PIF_VALUE: 27
PIF_VALUE: 22
PIF_VALUE: 30
PIF_VALUE: 28
PIF_VALUE: 27
PIF_VALUE: 28
PIF_VALUE: 22
PIF_VALUE: 28
PIF_VALUE: 28
PIF_VALUE: 27
PIF_VALUE: 22
PIF_VALUE: 19
PIF_VALUE: 30
PIF_VALUE: 28
PIF_VALUE: 31

## 2023-03-21 ASSESSMENT — PAIN SCALES - GENERAL
PAINLEVEL_OUTOF10: 0

## 2023-03-21 NOTE — PROGRESS NOTES
Ventilator check complete; patient has a #7.5 ET tube secured at the 25 at the teeth. Breath sounds are coarse. Trachea is midline, Negative for subcutaneous air, and chest excursion is symmetric. Patient is also Negative for cyanosis and is Negative for pitting edema. All alarms are set and audible. Resuscitation bag is  at the head of the bed. Ventilator Settings  Mode FIO2 Rate Tidal Volume Pressure PEEP I:E Ratio   SIMV/VC  30 %       15 cm H2O     1:2.9      Peak airway pressure:     Minute ventilation:       ABG: No results for input(s): PH, PCO2, PO2, HCO3 in the last 72 hours.       Peace Pu, RCP

## 2023-03-21 NOTE — ANESTHESIA PRE PROCEDURE
2 mg IntraVENous Q1H PRN Kian Lane MD   2 mg at 03/17/23 1559    norepinephrine (LEVOPHED) 16 mg in sodium chloride 0.9 % 250 mL infusion  1-30 mcg/min IntraVENous Continuous Kian Lane MD   Stopped at 03/17/23 8285    pantoprazole (PROTONIX) 40 mg in sodium chloride (PF) 0.9 % 10 mL injection  40 mg IntraVENous Daily Kian Lane MD   40 mg at 03/21/23 0831    glucose chewable tablet 16 g  4 tablet Oral PRN Krystal Pablo, APRN - CNP        dextrose bolus 10% 125 mL  125 mL IntraVENous PRN Krystal Siegelorn, APRN - CNP        Or    dextrose bolus 10% 250 mL  250 mL IntraVENous PRN Krystal Siegelorn, APRN - CNP        glucagon (rDNA) injection 1 mg  1 mg SubCUTAneous PRN Krystal Siegelorn, APRN - CNP        dextrose 10 % infusion   IntraVENous Continuous PRN Krystal Pablo, APRN - CNP        [Held by provider] fenofibrate (TRIGLIDE) tablet 160 mg  160 mg Oral Daily Krystal Pablo, APRN - CNP   160 mg at 03/18/23 0930    sodium chloride flush 0.9 % injection 5-40 mL  5-40 mL IntraVENous 2 times per day Krystal Pablo, APRN - CNP   10 mL at 03/21/23 3560    sodium chloride flush 0.9 % injection 5-40 mL  5-40 mL IntraVENous PRN Krystal Siegelorn, APRN - CNP        0.9 % sodium chloride infusion   IntraVENous PRN Krystal Siegelorn, APRN - CNP        ondansetron (ZOFRAN-ODT) disintegrating tablet 4 mg  4 mg Oral Q8H PRN Krystal Siegelorn, APRN - CNP        Or    [facility]etron [facility]) injection 4 mg  4 mg IntraVENous Q6H PRN Krystal Siegelorn, APRN - CNP        polyethylene glycol (GLYCOLAX) packet 17 g  17 g Oral Daily PRN Krystal Pablo, APRN - CNP        nitroGLYCERIN (NITROSTAT) SL tablet 0.4 mg  0.4 mg SubLINGual Q5 Min PRN Krystal Pablo, APRN - CNP        potassium chloride (KLOR-CON M) extended release tablet 40 mEq  40 mEq Oral PRN Krystal Pablo, APRN - CNP        Or    potassium bicarb-citric acid (EFFER-K) effervescent tablet 40 mEq  40 mEq Oral PRN ABHILASH Pedroza - CNP        Or    potassium chloride 10

## 2023-03-21 NOTE — PROGRESS NOTES
Mr. Martin Hagen remains on hold for PT. Will continue to check back.   [address] [facility], PT

## 2023-03-21 NOTE — BRIEF OP NOTE
Brief Postoperative Note      Patient: Pia Rob  YOB: 1955  MRN: [id number]    Date of Procedure: 3/21/2023    Pre-Op Diagnosis: Atherosclerosis of native coronary artery of native heart without angina pectoris [I25.10]    Post-Op Diagnosis: Same       Procedure(s):  EXPLANT OF IMPELLA    Surgeon(s):  Lisa Myers MD    Assistant:  Surgical Assistant: Rich Estrada    Anesthesia: General    Estimated Blood Loss (mL): Minimal    Complications: None    Specimens:   * No specimens in log *    Implants:  * No implants in log *      Drains:   Chest Tube Anterior; Other (Comment) Mediastinal (Active)   Chest Tube Airleak No 03/21/23 1100   Status Continuous Suction 03/21/23 1100   Suction -20 cm H2O 03/21/23 1100   Y Connector Used Yes 03/21/23 1100   Drainage Description Serous 03/21/23 1100   Dressing Status Clean, dry & intact 03/21/23 1100   Chest Tube Dressing Dry 03/21/23 1100   Site Assessment Clean, dry & intact 03/21/23 1100   Surrounding Skin Clean, dry & intact 03/21/23 1100   Output (ml) 10 ml 03/21/23 1100       Chest Tube Left; Anterior Pleural (Active)   Chest Tube Airleak No 03/21/23 1100   Status Continuous Suction 03/21/23 1100   Suction -20 cm H2O 03/21/23 1100   Y Connector Used Yes 03/21/23 1100   Drainage Description Serous 03/21/23 1100   Dressing Status Clean, dry & intact 03/21/23 1100   Chest Tube Dressing Dry 03/21/23 1100   Site Assessment Clean, dry & intact 03/21/23 1100   Surrounding Skin Clean, dry & intact 03/21/23 1100   Output (ml) 2 ml 03/17/23 2300       NG/OG/NJ/NE Tube Orogastric 16 fr Center mouth (Active)   Surrounding Skin Clean, dry & intact 03/21/23 1100   Securement device Tape 03/21/23 1100   Status Suction-low intermittent 03/21/23 1100   Placement Verified External Catheter Length 03/20/23 0300   NG/OG/NJ/NE External Measurement (cm) 63 cm 03/21/23 0701   Drainage Appearance Green 03/21/23 1100   Tube Feeding Intake (mL) 30 ml 03/19/23 1332 Tube Feeding Supplement Amount (mL) 90 03/18/23 0515   Free Water/Flush (mL) 50 mL 03/19/23 2045   Output (mL) 350 ml 03/17/23 8530   Action Taken Placement verified (comment) 03/19/23 1930       Urinary Catheter 03/16/23 Baeza (Active)   $ Urethral catheter insertion $ Not inserted for procedure 03/16/23 2342   Catheter Indications Need for fluid volume management of the critically ill patient in a critical care setting 03/21/23 1100   Site Assessment No urethral drainage 03/21/23 1100   Urine Color Yellow 03/21/23 1100   Urine Appearance Clear 03/21/23 1100   Collection Container Standard 03/21/23 1100   Securement Method Securing device (Describe) 03/21/23 1100   Catheter Care  Perineal wipes 03/21/23 0302   Catheter Best Practices  Drainage tube clipped to bed;Catheter secured to thigh; Tamper seal intact; Bag below bladder;Bag not on floor; Lack of dependent loop in tubing;Drainage bag less than half full 03/21/23 1100   Status Draining;Patent 03/21/23 1100   Output (mL) 150 mL 03/21/23 1100       Findings: explant    Electronically signed by Brittany Wiseman MD on 3/21/2023 at 1:32 PM

## 2023-03-21 NOTE — PROGRESS NOTES
03/20/23  0220 03/21/23  0304   WBC 5.8 5.5 6.8   HGB 8.1* 8.3* 9.1*   HCT 25.5* 26.4* 27.7*   * 113* 152          Recent Labs     03/19/23  0322 03/19/23  1935 03/20/23 0223 03/21/23  0301   *  --  147* 147*   K 4.2 4.3 4.1 4.0   *  --  119* 117*   CO2 27  --  26 26   BUN 38*  --  36* 39*   CREATININE 2.40*  --  1.50 1.40   GLUCOSE 103*  --  103* 105*   CALCIUM 7.9*  --  8.0* 8.0*           Lab Results   Component Value Date    CALCIUM 8.0 (L) 03/21/2023     Lab Results   Component Value Date    LABALBU 2.6 (L) 03/16/2023         Assessment and Plan:   ANNMARIE on CKD 3b  - ANNMARIE consistent with ATN from cardiac arrest and CABG. Contrast exposure   non oliguric. Improving  Volume overload,  Lasix      DM2 -     Hypernatremia - mild,     ACS / 2 cardiac arrests / CABG x 4 / Impella    Resp.  Failure         Kristina Leslie MD  [facility]

## 2023-03-21 NOTE — PROGRESS NOTES
[facility] Critical Care Note[de-identified] 3/21/2023  Rachael Calvo  Admission Date: 3/15/2023     Length of Stay: 6 days    Background: 74 y.o. male with  a h/o CAD with inferior ST elevations, multivessel coronary disease with PCI of 3 obtuse marginals in 2005, Mobitz type I second-degree AV block, diabetes mellitus, hyperlipidemia who was admitted to [address] [place] [address] on 3/15/2023 with chest pain. 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 EF was 40 to 45% and the LVEDP was 19.     3/18 he had a CODE BLUE arrest at about 5 PM for R on T provoking ventricular tachycardia / torsades pattern. He was quickly defibrillated at 200J which did not lead to palpable pulses and asystole appeared to be present on the bedside monitor. CPR was  initiated and 1 mg of epinephrine was given in addition to an amp of bicarbonate. Magnesium 2 mg was given initially. 300 mg bolus of amiodarone was pushed and pulse was regained within 5 minutes. Initial neurologic status was nonfocal and minimally responsive,breathing was apneic with coarse breath sounds on the left. Intubation was performed and the patient was transferred to the CCU. Neurologic status improved over the following 30 minutes during this transfer and purposeful movements were noted, patient clearly awake. Dr. Genaro Sy was present throughout the acute management during and immediately post-arrest.  We discussed hypothermia and felt his neurologic responses and extremely brief time to ROSC obviated need for this. In the CCU, sedation was started for central line placement and amiodarone infusion was ongoing. Several brief runs of ventricular tachycardia occurred and lidocaine 50mg was given. Pulses were maintained throughout.   CVC was placed in Left IJ and patient was sedated with fentanyl Microbiology:   Results       Procedure Component Value Units Date/Time    Culture, Blood 1 [[id number]] Collected: 03/18/23 0755    Order Status: Completed Specimen: Blood Updated: 03/21/23 0930     Special Requests --        RIGHT  HAND       Culture NO GROWTH 3 DAYS       Culture, Respiratory [[id number]]  (Abnormal)  (Susceptibility) Collected: 03/18/23 0755    Order Status: Completed Specimen: Endotracheal Updated: 03/21/23 0702     Special Requests NO SPECIAL REQUESTS        Gram stain 3 TO 30 WBCS SEEN /OIF      0 EPITHELIAL CELLS SEEN      FEW Gram positive cocci         FEW GRAM POSITIVE RODS         3+ MUCOUS        Culture       MODERATE Staphylococcus aureus                  MODERATE NORMAL RESPIRATORY MORENA          Susceptibility        Staphylococcus aureus      BACTERIAL SUSCEPTIBILITY PANEL COLLEEN      ceFAZolin <=2 ug/mL Sensitive      clindamycin <=0.5 ug/mL Sensitive      oxacillin 0.5 ug/mL Sensitive      rifampin <=0.5 ug/mL Sensitive  [1]       tetracycline <=0.5 ug/mL Sensitive      trimethoprim-sulfamethoxazole <=0.5/9.5 ug/mL Sensitive      vancomycin 1 ug/mL Sensitive                   [1]  Rifampin is not to be used for mono-therapy. Culture, Blood 1 [[id number]] Collected: 03/18/23 0754    Order Status: Completed Specimen: Blood Updated: 03/21/23 0930     Special Requests --        LEFT  HAND  ARTL (ART LINE)       Culture NO GROWTH 3 DAYS       Culture, Urine [[id number]] Collected: 03/18/23 0744    Order Status: Completed Specimen: Urine Updated: 03/20/23 0906     Special Requests NO SPECIAL REQUESTS        Culture NO GROWTH 2 DAYS       MSSA/MRSA Screen BY PCR [[id number]]  (Abnormal) Collected: 03/16/23 2038    Order Status: Completed Specimen: Swab from Nares Updated: 03/17/23 0620     Special Requests NO SPECIAL REQUESTS        Culture       MRSA target DNA not detected, SA target DNA detected.    A MRSA negative, SA positive test result does not preclude MRSA

## 2023-03-21 NOTE — ANESTHESIA POSTPROCEDURE EVALUATION
Department of Anesthesiology  Postprocedure Note    Patient: Vincent Hitchcock  MRN: [id number]  YOB: 1955  Date of evaluation: 3/21/2023      Procedure Summary     Date: 03/21/23 Room / Location: [facility] MAIN OR 05 / [facility] MAIN OR    Anesthesia Start: 1238 Anesthesia Stop: 1348    Procedure: Jarrell Miguelm (Neck) Diagnosis:       Atherosclerosis of native coronary artery of native heart without angina pectoris      (Atherosclerosis of native coronary artery of native heart without angina pectoris [I25.10])    Providers: Jenn Rivera MD Responsible Provider: Don Novoa MD    Anesthesia Type: general ASA Status: 4          Anesthesia Type: No value filed. Jane Phase I: Jane Score: 10    Jane Phase II:        Anesthesia Post Evaluation    Patient location during evaluation: ICU  Note status: sedated. Post-procedure mental status: sedated. Pain score: 0  Airway patency: intubated.   Nausea & Vomiting: no nausea and no vomiting  Complications: no  Cardiovascular status: blood pressure returned to baseline  Respiratory status: ventilator and intubated  Hydration status: euvolemic  Comments: BP (!) 101/58   Pulse 79   Temp 99.7 °F (37.6 °C) (Core)   Resp 16   Ht 6' (1.829 m)   Wt 250 lb (113.4 kg)   SpO2 97%   BMI 33.91 kg/m²   Multimodal analgesia pain management approach

## 2023-03-21 NOTE — PROGRESS NOTES
[facility] CARDIOLOGY PROGRESS NOTE           3/21/2023 7:15 AM    Admit Date: 3/15/2023    Subjective:   Patient is hemodynamically stable remains intubated and sedated. ROS:  UNABLE TO OBTAIN DUE TO INABILITY OF PATIENT TO COMMUNICATE SECONDARY TO CURRENT INTUBATION AND NEED FOR MECHANICAL VENTILATION. Objective:      Vitals:    03/21/23 0347 03/21/23 0402 03/21/23 0417 03/21/23 0432   BP:  117/60  115/60   Pulse: 84 84 86 85   Resp: 18 16 16 16   Temp:  98.6 °F (37 °C)     TempSrc:  Core     SpO2: 91% 95% 95% 95%   Weight:       Height:           Physical Exam:  General-Intubated. Neck- supple, no JVD  CV- regular rate and rhythm no MRG  Lung- clear bilaterally  Abd- soft, nontender, nondistended  Ext- no edema bilaterally. Skin- warm and dry  Psychiatric:  Unable to accurately assess due to intubated and sedated status. Neurologic:  Unable to accurately assess due to intubated and sedated status. Data Review:   Recent Labs     03/20/23  0220 03/20/23  0223 03/21/23  0301 03/21/23  0304   NA  --  147* 147*  --    K  --  4.1 4.0  --    MG  --  2.7* 2.6*  --    BUN  --  36* 39*  --    WBC 5.5  --   --  6.8   HGB 8.3*  --   --  9.1*   HCT 26.4*  --   --  27.7*   *  --   --  152         TELEMETRY:  SR    Assessment/Plan:     Principal Problem:    NSTEMI (non-ST elevated myocardial infarction) ([facility] [address]  Plan: Patient with complex coronary artery disease now status post coronary bypass grafting. Patient with intraoperative placement of 5.5 Impella. Patient is stable at P4. Plan for explant today. Active Problems:    ANNMARIE (acute kidney injury) ([facility] [address]  Plan: Acute kidney injury has resolved post surgery. Monitor closely. Acute pulmonary edema (HCC)  Plan: Gentle diuresis likely appropriate. History of coronary artery stent placement  Plan: Consider dual antiplatelet therapy at discharge.     Cardiac arrest [facility])  Plan: Secondary to polymorphic VT in the face of ischemia. Will monitor closely. If arrhythmias persist will need to consider LifeVest/ICD at discharge. Acute respiratory failure with [facility] ([facility]  Plan: Patient is sedated and intubated. Actively weaning. Ventricular arrhythmia  Plan: See above    Acute myocardial infarction, subendocardial infarction, initial episode of care [facility])  Plan: See above    Diabetes mellitus type 1.5 ([facility] [address]  Plan: Manage per primary team.  Patient currently on insulin drip. Essential hypertension  Plan: Holding therapy at this time. We will optimize therapy for both reduction and LVEF as well as hypertension as patient hemodynamically stabilizes    Hyperlipidemia  Plan: High intensity statin therapy.           Jean Marie Cervantes MD  3/21/2023 7:15 AM

## 2023-03-21 NOTE — PROGRESS NOTES
This is a follow-up visit to the patient, providing a spiritual presence, emotional support and prayer. The patient appears to be resting.     Gena Benites, [address] [facility], [facility]

## 2023-03-21 NOTE — PROGRESS NOTES
Ventilator check complete; patient has a #7.5 ET tube secured at the 26 at the teeth. Patient is  sedated. Patient is not able to follow commands. Breath sounds are clear and diminished. Trachea is midline, Negative for subcutaneous air, and chest excursion is symmetric. Patient is also Negative for cyanosis and is Negative for pitting edema. All alarms are set and audible. Resuscitation bag is  at the head of the bed.       Ventilator Settings  Mode FIO2 Rate Tidal Volume Pressure PEEP I:E Ratio   SIMV/VC+  30 %   16 550cc    8cm H2O 1:2.9      Peak airway pressure:  32  Minute ventilation:   9.02        meghna mota, RANJEETP

## 2023-03-21 NOTE — OP NOTE
[facility]  OPERATIVE REPORT    Name:  Cynthia Chairez  MR#:  [id number]  :  1955  ACCOUNT #:  [de-identified]  DATE OF SERVICE:  2023    PREOPERATIVE DIAGNOSIS:  Cardiogenic shock with explant of Impella 5.5. POSTOPERATIVE DIAGNOSIS:  Cardiogenic shock with explant of Impella 5.5. PROCEDURE PERFORMED:  Explant of Impella 5.5. SURGEON:  Yuli Alexander MD    ASSISTANT:      ANESTHESIA:  General endotracheal.    COMPLICATIONS:  None. SPECIMENS REMOVED:  .    IMPLANTS:  .    ESTIMATED BLOOD LOSS:  Minimal.    INDICATION:  The patient is four days status post emergent CABG for ischemic cardiomyopathy and ventricular tachycardia/ventricular fibrillation. He had undergone emergent CABG and Impella 5.5 with direct aortic insertion was used for cardiogenic shock support. It has subsequently been weaned and no longer needed. PROCEDURE:  The patient was taken back to the operating room where general anesthesia was induced. He was prepped and draped in usual sterile fashion. The 10-mm graft was freed from its sutures. The Impella was turned off. Retraction sutures were placed on either side of the graft. A Remedios clamp was positioned for grafting. The base of the Impella explanted with no difficulty. The graft was then clamped, oversewn with a double layer of 4-0 Prolene. Good hemostasis. The wound was then copiously irrigated with saline. Incision was closed in two loose staples and a nonocclusive dressing was placed. All instrument and sponge counts correct at the end of the case.       Sarah Whalen MD      [id number]  D:  2023 13:37  T:  2023 17:25  JOB #:  [id number]

## 2023-03-21 NOTE — PROGRESS NOTES
Patient transitioned to P3 on Impella at 2000 per order for 4 hours. Hemodynamically stable on P3 but with repetitive retrograde backflow alarms on Impella with recommendation to increase P level. Unable to trial on P2 at this time related to these alarms. Pt increased to P4 and hemodynamically stable.

## 2023-03-21 NOTE — PROGRESS NOTES
POD 4 Days Post-Op    Procedure:  Procedure(s):  CABG CORONARY ARTERY BYPASS (CABGX4), LIMA; VEIN HARVEST ENDOSCOPIC, GREATER SAPHENOUS VEIN LEFT; LEFT ATRIAL APPENDAGE CLIPPING; IMPELLA INSERTION  TRANSESOPHAGEAL ECHOCARDIOGRAM  Ventricular assist device (VAD) insertion      Subjective:   On vent, follows commands      Objective:     Patient Vitals for the past 8 hrs:   BP Temp Temp src Pulse Resp SpO2   23 0714 -- -- -- 85 16 99 %   23 0700 126/75 99.4 °F (37.4 °C) CORE 85 16 98 %   23 0647 -- -- -- 85 16 98 %   23 0632 131/71 -- -- 85 16 98 %   23 0617 -- -- -- 83 17 96 %   23 0602 (!) 109/59 99.3 °F (37.4 °C) CORE 84 16 96 %   23 0547 -- -- -- 85 16 96 %   23 0532 (!) 110/59 -- -- 86 16 95 %   23 0517 -- -- -- 85 11 96 %   23 0502 112/61 99 °F (37.2 °C) CORE 86 17 95 %   23 0447 -- -- -- 85 -- 95 %   23 0432 115/60 -- -- 85 16 95 %   23 0417 -- -- -- 86 16 95 %   23 0402 117/60 98.6 °F (37 °C) CORE 84 16 95 %   23 0347 -- -- -- 84 18 91 %   23 0332 118/61 -- -- 85 16 95 %   23 0317 -- -- -- 84 16 95 %   23 0303 -- -- -- 84 16 95 %   23 0302 115/60 98.4 °F (36.9 °C) CORE 84 16 95 %   23 0247 -- -- -- 83 16 95 %   23 0232 111/61 -- -- 84 16 94 %   23 0217 -- -- -- 83 15 94 %   23 0202 111/60 98.2 °F (36.8 °C) -- 85 16 94 %   23 0147 -- -- -- 83 16 94 %   23 0132 118/62 -- -- 86 16 94 %   23 0117 -- -- -- 87 16 95 %   23 0102 128/66 98.3 °F (36.8 °C) CORE 90 16 95 %   23 0047 -- -- -- 90 16 95 %   23 0032 138/64 -- -- 91 19 95 %   23 0017 -- -- -- 92 19 95 %   23 0016 -- -- -- 92 18 95 %   23 0012 -- -- -- 93 21 96 %   23 0009 -- -- -- 92 22 94 %   23 0002 (!) 144/80 97.9 °F (36.6 °C) CORE 90 (!) 33 98 %   23 2347 -- -- -- 86 18 98 %   23 2332 (!) 153/84 -- -- 84 16 99 %     Temp (24hrs), Av °F Glucose 109 (H) 65 - 100 mg/dL    Performed by: Radha    POCT Glucose    Collection Time: 03/20/23  8:13 PM   Result Value Ref Range    POC Glucose 102 (H) 65 - 100 mg/dL    Performed by: Tammy    POCT Glucose    Collection Time: 03/20/23  9:48 PM   Result Value Ref Range    POC Glucose 111 (H) 65 - 100 mg/dL    Performed by: Shabbir Whitney    TYPE AND SCREEN    Collection Time: 03/20/23 10:16 PM   Result Value Ref Range    Crossmatch expiration date 03/23/2023,2359     ABO/Rh A POSITIVE     Antibody Screen NEG     Unit Number [id number]     Product Code Blood Bank RC LR     Unit Divison 00     Dispense Status Blood Bank ALLOCATED     Crossmatch Result Compatible    POCT Glucose    Collection Time: 03/20/23 10:16 PM   Result Value Ref Range    POC Glucose 108 (H) 65 - 100 mg/dL    Performed by: Tammy    POCT Glucose    Collection Time: 03/20/23 11:18 PM   Result Value Ref Range    POC Glucose 102 (H) 65 - 100 mg/dL    Performed by: Tammy    POCT Glucose    Collection Time: 03/21/23 12:21 AM   Result Value Ref Range    POC Glucose 93 65 - 100 mg/dL    Performed by: Tammy    POCT Glucose    Collection Time: 03/21/23  1:26 AM   Result Value Ref Range    POC Glucose 90 65 - 100 mg/dL    Performed by: Tamym    POCT Glucose    Collection Time: 03/21/23  2:16 AM   Result Value Ref Range    POC Glucose 97 65 - 100 mg/dL    Performed by: Shabbir Whitney    Arterial Blood Gas, POC    Collection Time: 03/21/23  2:56 AM   Result Value Ref Range    DEVICE ADULT VENT      FIO2 30 %    pH, Arterial, POC 7.45 7.35 - 7.45      pCO2, Arterial, POC 34.8 (L) 35 - 45 MMHG    pO2, Arterial, POC 73 (L) 75 - 100 MMHG    HCO3, Mixed 24.3 22 - 26 MMOL/L    SO2c, Arterial, POC 95.3 95 - 98 %    Base Excess 0.5 mmol/L    Mode SIMV      POC TIDAL VOLUME 550 ml    POC PEEP 8 cmH2O    POC Pressure Support 15 cmH2O    POC Kevon's Test NOT APPLICABLE

## 2023-03-22 ENCOUNTER — APPOINTMENT (OUTPATIENT)
Dept: GENERAL RADIOLOGY | Age: 68
DRG: 215 | End: 2023-03-22
Payer: COMMERCIAL

## 2023-03-22 ENCOUNTER — APPOINTMENT (OUTPATIENT)
Dept: NON INVASIVE DIAGNOSTICS | Age: 68
DRG: 215 | End: 2023-03-22
Payer: COMMERCIAL

## 2023-03-22 LAB
ANION GAP SERPL CALC-SCNC: 6 MMOL/L (ref 2–11)
ARTERIAL PATENCY WRIST A: POSITIVE
BASE DEFICIT BLD-SCNC: 2.3 MMOL/L
BDY SITE: ABNORMAL
BUN SERPL-MCNC: 51 MG/DL (ref 8–23)
CALCIUM SERPL-MCNC: 8.2 MG/DL (ref 8.3–10.4)
CHLORIDE SERPL-SCNC: 114 MMOL/L (ref 101–110)
CO2 SERPL-SCNC: 24 MMOL/L (ref 21–32)
CREAT SERPL-MCNC: 1.9 MG/DL (ref 0.8–1.5)
ECHO AO ASC DIAM: 3.2 CM
ECHO AO ASCENDING AORTA INDEX: 1.37 CM/M2
ECHO AO ROOT DIAM: 3.3 CM
ECHO AO ROOT INDEX: 1.42 CM/M2
ECHO AV AREA PEAK VELOCITY: 1.7 CM2
ECHO AV AREA VTI: 1.8 CM2
ECHO AV AREA/BSA PEAK VELOCITY: 0.7 CM2/M2
ECHO AV AREA/BSA VTI: 0.8 CM2/M2
ECHO AV MEAN GRADIENT: 9 MMHG
ECHO AV MEAN VELOCITY: 1.4 M/S
ECHO AV PEAK GRADIENT: 16 MMHG
ECHO AV PEAK VELOCITY: 2 M/S
ECHO AV VELOCITY RATIO: 0.55
ECHO AV VTI: 37.2 CM
ECHO BSA: 2.4 M2
ECHO LA AREA 2C: 20.5 CM2
ECHO LA AREA 4C: 25.5 CM2
ECHO LA MAJOR AXIS: 5.9 CM
ECHO LA MINOR AXIS: 5.9 CM
ECHO LA VOL 2C: 60 ML (ref 18–58)
ECHO LA VOL 4C: 88 ML (ref 18–58)
ECHO LA VOL BP: 73 ML (ref 18–58)
ECHO LA VOL/BSA BIPLANE: 31 ML/M2 (ref 16–34)
ECHO LA VOLUME INDEX A2C: 26 ML/M2 (ref 16–34)
ECHO LA VOLUME INDEX A4C: 38 ML/M2 (ref 16–34)
ECHO LV E' LATERAL VELOCITY: 9 CM/S
ECHO LV E' SEPTAL VELOCITY: 6 CM/S
ECHO LV EDV A2C: 140 ML
ECHO LV EDV A4C: 153 ML
ECHO LV EDV INDEX A4C: 66 ML/M2
ECHO LV EDV NDEX A2C: 60 ML/M2
ECHO LV EJECTION FRACTION A2C: 56 %
ECHO LV EJECTION FRACTION A4C: 56 %
ECHO LV EJECTION FRACTION BIPLANE: 56 % (ref 55–100)
ECHO LV ESV A2C: 62 ML
ECHO LV ESV A4C: 68 ML
ECHO LV ESV INDEX A2C: 27 ML/M2
ECHO LV ESV INDEX A4C: 29 ML/M2
ECHO LV FRACTIONAL SHORTENING: 27 % (ref 28–44)
ECHO LV INTERNAL DIMENSION DIASTOLE INDEX: 2.23 CM/M2
ECHO LV INTERNAL DIMENSION DIASTOLIC: 5.2 CM (ref 4.2–5.9)
ECHO LV INTERNAL DIMENSION SYSTOLIC INDEX: 1.63 CM/M2
ECHO LV INTERNAL DIMENSION SYSTOLIC: 3.8 CM
ECHO LV IVSD: 0.9 CM (ref 0.6–1)
ECHO LV MASS 2D: 194.2 G (ref 88–224)
ECHO LV MASS INDEX 2D: 83.3 G/M2 (ref 49–115)
ECHO LV POSTERIOR WALL DIASTOLIC: 1.1 CM (ref 0.6–1)
ECHO LV RELATIVE WALL THICKNESS RATIO: 0.42
ECHO LVOT AREA: 3.1 CM2
ECHO LVOT AV VTI INDEX: 0.56
ECHO LVOT DIAM: 2 CM
ECHO LVOT MEAN GRADIENT: 3 MMHG
ECHO LVOT PEAK GRADIENT: 5 MMHG
ECHO LVOT PEAK VELOCITY: 1.1 M/S
ECHO LVOT STROKE VOLUME INDEX: 27.9 ML/M2
ECHO LVOT SV: 65 ML
ECHO LVOT VTI: 20.7 CM
ECHO MV A VELOCITY: 1 M/S
ECHO MV AREA VTI: 2 CM2
ECHO MV E DECELERATION TIME (DT): 164 MS
ECHO MV E VELOCITY: 1.16 M/S
ECHO MV E/A RATIO: 1.16
ECHO MV E/E' LATERAL: 12.89
ECHO MV E/E' RATIO (AVERAGED): 16.11
ECHO MV E/E' SEPTAL: 19.33
ECHO MV LVOT VTI INDEX: 1.58
ECHO MV MAX VELOCITY: 1.2 M/S
ECHO MV MEAN GRADIENT: 2 MMHG
ECHO MV MEAN VELOCITY: 0.7 M/S
ECHO MV PEAK GRADIENT: 5 MMHG
ECHO MV VTI: 32.8 CM
ECHO PULMONARY ARTERY END DIASTOLIC PRESSURE: 4 MMHG
ECHO PV ACCELERATION TIME (AT): 114 MS
ECHO PV MAX VELOCITY: 1 M/S
ECHO PV PEAK GRADIENT: 4 MMHG
ECHO PV REGURGITANT MAX VELOCITY: 1 M/S
ECHO RV BASAL DIMENSION: 3.2 CM
ECHO RV FREE WALL PEAK S': 12 CM/S
ECHO RV INTERNAL DIMENSION: 2.8 CM
ECHO RV TAPSE: 1.6 CM (ref 1.7–?)
EKG ATRIAL RATE: 74 BPM
EKG DIAGNOSIS: NORMAL
EKG P AXIS: 66 DEGREES
EKG P-R INTERVAL: 202 MS
EKG Q-T INTERVAL: 414 MS
EKG QRS DURATION: 92 MS
EKG QTC CALCULATION (BAZETT): 459 MS
EKG R AXIS: -28 DEGREES
EKG T AXIS: 47 DEGREES
EKG VENTRICULAR RATE: 74 BPM
ERYTHROCYTE [DISTWIDTH] IN BLOOD BY AUTOMATED COUNT: 15.9 % (ref 11.9–14.6)
GAS FLOW.O2 O2 DELIVERY SYS: ABNORMAL
GLUCOSE BLD STRIP.AUTO-MCNC: 297 MG/DL (ref 65–100)
GLUCOSE BLD STRIP.AUTO-MCNC: 297 MG/DL (ref 65–100)
GLUCOSE BLD STRIP.AUTO-MCNC: 326 MG/DL (ref 65–100)
GLUCOSE BLD STRIP.AUTO-MCNC: 327 MG/DL (ref 65–100)
GLUCOSE SERPL-MCNC: 272 MG/DL (ref 65–100)
HCO3 BLD-SCNC: 21.1 MMOL/L (ref 22–26)
HCT VFR BLD AUTO: 28.7 % (ref 41.1–50.3)
HGB BLD-MCNC: 8.9 G/DL (ref 13.6–17.2)
MAGNESIUM SERPL-MCNC: 2.7 MG/DL (ref 1.8–2.4)
MCH RBC QN AUTO: 29.3 PG (ref 26.1–32.9)
MCHC RBC AUTO-ENTMCNC: 31 G/DL (ref 31.4–35)
MCV RBC AUTO: 94.4 FL (ref 82–102)
NRBC # BLD: 0 K/UL (ref 0–0.2)
O2/TOTAL GAS SETTING VFR VENT: 35 %
PCO2 BLD: 30.2 MMHG (ref 35–45)
PEEP RESPIRATORY: 8 CMH2O
PH BLD: 7.45 (ref 7.35–7.45)
PLATELET # BLD AUTO: 197 K/UL (ref 150–450)
PMV BLD AUTO: 10.8 FL (ref 9.4–12.3)
PO2 BLD: 68 MMHG (ref 75–100)
POTASSIUM SERPL-SCNC: 4.6 MMOL/L (ref 3.5–5.1)
PRESSURE SUPPORT SETTING VENT: 15 CMH2O
RBC # BLD AUTO: 3.04 M/UL (ref 4.23–5.6)
RESPIRATORY RATE, POC: 21 (ref 5–40)
SAO2 % BLD: 94.4 % (ref 95–98)
SERVICE CMNT-IMP: ABNORMAL
SODIUM SERPL-SCNC: 144 MMOL/L (ref 133–143)
SPECIMEN TYPE: ABNORMAL
VENTILATION MODE VENT: ABNORMAL
VT SETTING VENT: 500 ML
WBC # BLD AUTO: 7.7 K/UL (ref 4.3–11.1)

## 2023-03-22 PROCEDURE — C8929 TTE W OR WO FOL WCON,DOPPLER: HCPCS

## 2023-03-22 PROCEDURE — C9113 INJ PANTOPRAZOLE SODIUM, VIA: HCPCS | Performed by: INTERNAL MEDICINE

## 2023-03-22 PROCEDURE — 36600 WITHDRAWAL OF ARTERIAL BLOOD: CPT

## 2023-03-22 PROCEDURE — 80048 BASIC METABOLIC PNL TOTAL CA: CPT

## 2023-03-22 PROCEDURE — 2580000003 HC RX 258: Performed by: INTERNAL MEDICINE

## 2023-03-22 PROCEDURE — 83735 ASSAY OF MAGNESIUM: CPT

## 2023-03-22 PROCEDURE — 36592 COLLECT BLOOD FROM PICC: CPT

## 2023-03-22 PROCEDURE — 94003 VENT MGMT INPAT SUBQ DAY: CPT

## 2023-03-22 PROCEDURE — 2580000003 HC RX 258: Performed by: THORACIC SURGERY (CARDIOTHORACIC VASCULAR SURGERY)

## 2023-03-22 PROCEDURE — 6360000004 HC RX CONTRAST MEDICATION: Performed by: THORACIC SURGERY (CARDIOTHORACIC VASCULAR SURGERY)

## 2023-03-22 PROCEDURE — 6360000002 HC RX W HCPCS: Performed by: THORACIC SURGERY (CARDIOTHORACIC VASCULAR SURGERY)

## 2023-03-22 PROCEDURE — 99233 SBSQ HOSP IP/OBS HIGH 50: CPT | Performed by: INTERNAL MEDICINE

## 2023-03-22 PROCEDURE — 2500000003 HC RX 250 WO HCPCS: Performed by: THORACIC SURGERY (CARDIOTHORACIC VASCULAR SURGERY)

## 2023-03-22 PROCEDURE — 6370000000 HC RX 637 (ALT 250 FOR IP): Performed by: PHYSICIAN ASSISTANT

## 2023-03-22 PROCEDURE — 6370000000 HC RX 637 (ALT 250 FOR IP): Performed by: INTERNAL MEDICINE

## 2023-03-22 PROCEDURE — A4216 STERILE WATER/SALINE, 10 ML: HCPCS | Performed by: INTERNAL MEDICINE

## 2023-03-22 PROCEDURE — 6370000000 HC RX 637 (ALT 250 FOR IP): Performed by: NURSE PRACTITIONER

## 2023-03-22 PROCEDURE — 99291 CRITICAL CARE FIRST HOUR: CPT | Performed by: INTERNAL MEDICINE

## 2023-03-22 PROCEDURE — 93005 ELECTROCARDIOGRAM TRACING: CPT | Performed by: THORACIC SURGERY (CARDIOTHORACIC VASCULAR SURGERY)

## 2023-03-22 PROCEDURE — 6370000000 HC RX 637 (ALT 250 FOR IP): Performed by: THORACIC SURGERY (CARDIOTHORACIC VASCULAR SURGERY)

## 2023-03-22 PROCEDURE — 85027 COMPLETE CBC AUTOMATED: CPT

## 2023-03-22 PROCEDURE — 82803 BLOOD GASES ANY COMBINATION: CPT

## 2023-03-22 PROCEDURE — 93306 TTE W/DOPPLER COMPLETE: CPT | Performed by: INTERNAL MEDICINE

## 2023-03-22 PROCEDURE — 6360000002 HC RX W HCPCS: Performed by: INTERNAL MEDICINE

## 2023-03-22 PROCEDURE — 71045 X-RAY EXAM CHEST 1 VIEW: CPT

## 2023-03-22 PROCEDURE — 2100000000 HC CCU R&B

## 2023-03-22 PROCEDURE — 82962 GLUCOSE BLOOD TEST: CPT

## 2023-03-22 RX ORDER — INSULIN LISPRO 100 [IU]/ML
0-4 INJECTION, SOLUTION INTRAVENOUS; SUBCUTANEOUS EVERY 6 HOURS
Status: DISCONTINUED | OUTPATIENT
Start: 2023-03-22 | End: 2023-03-24 | Stop reason: ALTCHOICE

## 2023-03-22 RX ADMIN — Medication 1 AMPULE: at 09:55

## 2023-03-22 RX ADMIN — ASPIRIN 81 MG: 81 TABLET ORAL at 08:24

## 2023-03-22 RX ADMIN — DEXTROSE MONOHYDRATE, SODIUM CHLORIDE, AND POTASSIUM CHLORIDE 25 ML/HR: 50; 4.5; 1.49 INJECTION, SOLUTION INTRAVENOUS at 09:45

## 2023-03-22 RX ADMIN — METOPROLOL TARTRATE 25 MG: 25 TABLET, FILM COATED ORAL at 22:25

## 2023-03-22 RX ADMIN — SODIUM CHLORIDE, PRESERVATIVE FREE 10 ML: 5 INJECTION INTRAVENOUS at 09:00

## 2023-03-22 RX ADMIN — SODIUM CHLORIDE, PRESERVATIVE FREE 0.45 ML: 5 INJECTION INTRAVENOUS at 09:36

## 2023-03-22 RX ADMIN — METOPROLOL TARTRATE 12.5 MG: 25 TABLET, FILM COATED ORAL at 09:57

## 2023-03-22 RX ADMIN — OXYCODONE AND ACETAMINOPHEN 1 TABLET: 5; 325 TABLET ORAL at 12:55

## 2023-03-22 RX ADMIN — SODIUM CHLORIDE, PRESERVATIVE FREE 30 ML: 5 INJECTION INTRAVENOUS at 08:30

## 2023-03-22 RX ADMIN — FENTANYL CITRATE 55 MCG/HR: 50 INJECTION, SOLUTION INTRAMUSCULAR; INTRAVENOUS at 11:58

## 2023-03-22 RX ADMIN — INSULIN GLARGINE 5 UNITS: 100 INJECTION, SOLUTION SUBCUTANEOUS at 08:22

## 2023-03-22 RX ADMIN — SODIUM CHLORIDE 1 MCG/KG/HR: 9 INJECTION, SOLUTION INTRAVENOUS at 19:21

## 2023-03-22 RX ADMIN — INSULIN LISPRO 3 UNITS: 100 INJECTION, SOLUTION INTRAVENOUS; SUBCUTANEOUS at 17:37

## 2023-03-22 RX ADMIN — INSULIN LISPRO 2 UNITS: 100 INJECTION, SOLUTION INTRAVENOUS; SUBCUTANEOUS at 12:52

## 2023-03-22 RX ADMIN — LEVOTHYROXINE SODIUM 175 MCG: 50 TABLET ORAL at 05:08

## 2023-03-22 RX ADMIN — AMIODARONE HYDROCHLORIDE 200 MG: 200 TABLET ORAL at 08:30

## 2023-03-22 RX ADMIN — AMIODARONE HYDROCHLORIDE 200 MG: 200 TABLET ORAL at 22:24

## 2023-03-22 RX ADMIN — SODIUM CHLORIDE 0.2 MCG/KG/HR: 9 INJECTION, SOLUTION INTRAVENOUS at 04:51

## 2023-03-22 RX ADMIN — SODIUM CHLORIDE, PRESERVATIVE FREE 10 ML: 5 INJECTION INTRAVENOUS at 22:24

## 2023-03-22 RX ADMIN — CHLORHEXIDINE GLUCONATE 10 ML: 1.2 SOLUTION ORAL at 20:16

## 2023-03-22 RX ADMIN — FENTANYL CITRATE 55 MCG/HR: 50 INJECTION, SOLUTION INTRAMUSCULAR; INTRAVENOUS at 10:55

## 2023-03-22 RX ADMIN — CHLORHEXIDINE GLUCONATE 10 ML: 1.2 SOLUTION ORAL at 08:21

## 2023-03-22 RX ADMIN — INSULIN LISPRO 4 UNITS: 100 INJECTION, SOLUTION INTRAVENOUS; SUBCUTANEOUS at 08:21

## 2023-03-22 RX ADMIN — METOPROLOL TARTRATE 12.5 MG: 25 TABLET, FILM COATED ORAL at 13:16

## 2023-03-22 RX ADMIN — FAMOTIDINE 20 MG: 20 TABLET, FILM COATED ORAL at 08:30

## 2023-03-22 RX ADMIN — Medication 1 AMPULE: at 20:15

## 2023-03-22 RX ADMIN — SODIUM CHLORIDE 1 MCG/KG/HR: 9 INJECTION, SOLUTION INTRAVENOUS at 23:30

## 2023-03-22 RX ADMIN — PANTOPRAZOLE SODIUM 40 MG: 40 INJECTION, POWDER, LYOPHILIZED, FOR SOLUTION INTRAVENOUS at 08:23

## 2023-03-22 RX ADMIN — ATORVASTATIN CALCIUM 80 MG: 80 TABLET, FILM COATED ORAL at 22:24

## 2023-03-22 RX ADMIN — SODIUM CHLORIDE 0.5 MCG/KG/HR: 9 INJECTION, SOLUTION INTRAVENOUS at 16:24

## 2023-03-22 RX ADMIN — INSULIN LISPRO 3 UNITS: 100 INJECTION, SOLUTION INTRAVENOUS; SUBCUTANEOUS at 23:56

## 2023-03-22 ASSESSMENT — PULMONARY FUNCTION TESTS
PIF_VALUE: 18
PIF_VALUE: 22
PIF_VALUE: 23
PIF_VALUE: 18
PIF_VALUE: 19
PIF_VALUE: 21
PIF_VALUE: 19
PIF_VALUE: 20
PIF_VALUE: 20
PIF_VALUE: 23
PIF_VALUE: 19
PIF_VALUE: 22
PIF_VALUE: 20
PIF_VALUE: 24
PIF_VALUE: 19
PIF_VALUE: 23
PIF_VALUE: 22
PIF_VALUE: 23
PIF_VALUE: 21
PIF_VALUE: 23
PIF_VALUE: 20
PIF_VALUE: 21
PIF_VALUE: 18
PIF_VALUE: 19
PIF_VALUE: 23
PIF_VALUE: 24
PIF_VALUE: 18
PIF_VALUE: 23
PIF_VALUE: 18
PIF_VALUE: 23
PIF_VALUE: 17
PIF_VALUE: 23
PIF_VALUE: 23
PIF_VALUE: 18
PIF_VALUE: 18
PIF_VALUE: 20
PIF_VALUE: 18
PIF_VALUE: 18
PIF_VALUE: 23
PIF_VALUE: 19
PIF_VALUE: 24
PIF_VALUE: 19
PIF_VALUE: 23
PIF_VALUE: 18
PIF_VALUE: 24
PIF_VALUE: 19
PIF_VALUE: 22
PIF_VALUE: 17
PIF_VALUE: 16
PIF_VALUE: 23
PIF_VALUE: 24
PIF_VALUE: 19
PIF_VALUE: 22
PIF_VALUE: 18
PIF_VALUE: 19
PIF_VALUE: 23

## 2023-03-22 ASSESSMENT — PAIN SCALES - GENERAL
PAINLEVEL_OUTOF10: 0
PAINLEVEL_OUTOF10: 0

## 2023-03-22 NOTE — PROGRESS NOTES
POD 1 Day Post-Op    Procedure:  Procedure(s):  EXPLANT OF IMPELLA      Subjective:   Sedated on vent      Objective:     Patient Vitals for the past 8 hrs:   BP Temp Temp src Pulse Resp SpO2 Weight   23 0500 -- 99.7 °F (37.6 °C) Axillary -- -- -- --   23 0427 -- -- -- 74 22 95 % --   23 0345 -- -- -- -- -- -- 246 lb 4.1 oz (111.7 kg)   23 0215 -- -- -- 74 18 95 % --   23 0200 109/61 -- -- 73 16 95 % --   23 0145 -- -- -- 69 20 95 % --   23 0130 (!) 103/57 -- -- 70 21 95 % --   23 0115 -- -- -- 72 21 95 % --   23 0100 109/60 -- -- 73 20 95 % --   23 0047 -- -- -- 73 20 95 % --   23 0045 -- -- -- 74 17 93 % --   23 0030 107/63 -- -- 73 21 93 % --   23 0020 103/61 99.5 °F (37.5 °C) Axillary 70 22 96 % --   23 0015 103/61 -- -- 70 21 96 % --   23 0000 (!) 100/59 -- -- 71 18 95 % --   23 2345 (!) 100/58 -- -- 72 21 96 % --     Temp (24hrs), Av.4 °F (37.4 °C), Min:97.9 °F (36.6 °C), Max:100 °F (37.8 °C)        Hemodynamics    PAP    CO    CI    No intake/output data recorded.    1901 -  0700  In: 1165.1 [I.V.:865.1]  Out: 3263 [Urine:2660]    CT Drainage              total of all CT's    Heart:  regular rate and rhythm, S1, S2 normal, no murmur, click, rub or gallop  Lung:  clear to auscultation bilaterally  Neuro: Grossly non focal  Incisions: Clean, dry, and intact    Labs:  Recent Results (from the past 24 hour(s))   PREPARE RBC (CROSSMATCH), 3 Units    Collection Time: 23  8:00 AM   Result Value Ref Range    History Check Historical check performed    POCT Glucose    Collection Time: 23  8:16 AM   Result Value Ref Range    POC Glucose 112 (H) 65 - 100 mg/dL    Performed by: Radha    EKG 12 lead    Collection Time: 23  8:18 AM   Result Value Ref Range    Ventricular Rate 86 BPM    Atrial Rate 86 BPM    P-R Interval 178 ms    QRS Duration 90 ms    Q-T Interval 396 ms    QTc Calculation SO2c, Arterial, POC 94.4 (L) 95 - 98 %    BASE DEFICIT (POC) 2.3 mmol/L    Mode SIMV      POC TIDAL VOLUME 500 ml    POC PEEP 8 cmH2O    POC Pressure Support 15 cmH2O    POC Kevon's Test Positive      Respiratory Rate 21      Site RIGHT RADIAL      Specimen type: ARTERIAL      Performed by: Ilana     Respiratory Comment: [id number]        Assessment:     Principal Problem:    NSTEMI (non-ST elevated myocardial infarction) ([facility]  Active Problems:    ANNMARIE (acute kidney injury) ([facility]    Acute pulmonary edema (HCC)    History of coronary artery stent placement    Cardiac arrest ([facility]    Acute respiratory failure with hypoxia (HCC)    Ventricular arrhythmia    Acute myocardial infarction, subendocardial infarction, initial episode of care ([facility]    S/P CABG x 4    Pleural effusion    Diabetes mellitus type 1.5 ([facility]    Essential hypertension    Hyperlipidemia  Resolved Problems:    * No resolved hospital problems.  *      Plan/Recommendations/Medical Decision Making:   Stable after explant, wean to extubate    See orders

## 2023-03-22 NOTE — CONSULTS
Monitoring and Evaluation:   Behavioral-Environmental Outcomes: Knowledge or Skill  Food/Nutrient Intake Outcomes: Enteral Nutrition Intake/Tolerance  Physical Signs/Symptoms Outcomes: Weight, GI Status, Biochemical Data, Fluid Status or Edema    Discharge Planning:     Too soon to determine    Hoa Licea, RD

## 2023-03-22 NOTE — PROGRESS NOTES
This is a follow-up visit to the patient, providing a spiritual presence, emotional support and prayer. The patient appears to be resting. His wife, Jovita Thayer was with him.  had a prayer with the patient's daughter, Sky Chu and her boy friend in the hallway outside of the [facility]/    [facility], [address] [facility], [address] [facility]

## 2023-03-22 NOTE — PROGRESS NOTES
At 1600 pt resp rate 50's and anxious. Titrated Precedex, Fentanyl, and Ntg gtts to effect.  Pt now calm

## 2023-03-22 NOTE — PROGRESS NOTES
Ventilator check complete; patient has a #7.5 ET tube secured at the 26 at the teeth. Patient is  able to follow commands. Breath sounds are diminished. Trachea is midline and chest excursion is symmetric. Patient is Negative for cyanosis. All alarms are set and audible. Resuscitation bag is  at the head of the bed.       Ventilator Settings  Mode FIO2 Rate Tidal Volume Pressure PEEP I:E Ratio   CPAP/PS  35 %       10 cm H2O    8 1:1.7      Peak airway pressure:   24  Minute ventilation:   11.9        Jammie Mueller RCP

## 2023-03-22 NOTE — PROGRESS NOTES
Weaned Fentanyl and Precedex slowly to off, has been off for at least an hour. Pt will open eyes to voice on command, but will not follow any other commands! He moves his head side to side, opens and closes eyes, moves arms/legs bilat but will not do any of this to command. Wife has been allowed to stay at bedside, amelia fowlers with lights on, tv on news that he likes. VSS, has tolerated pressure support all day with good volumes.

## 2023-03-22 NOTE — PROGRESS NOTES
[facility] CARDIOLOGY PROGRESS NOTE           3/22/2023 7:03 AM    Admit Date: 3/15/2023    Subjective:   Patient had Impella removed yesterday. Remains in CV ICU. Intubated. In sinus rhythm. Not on any pressors. Renal function mildly worse this AM.  Weaning sedation to attempt extubation. ROS:  UNABLE TO OBTAIN DUE TO INABILITY OF PATIENT TO COMMUNICATE SECONDARY TO CURRENT INTUBATION AND NEED FOR MECHANICAL VENTILATION. Objective:      Vitals:    03/22/23 0215 03/22/23 0345 03/22/23 0427 03/22/23 0500   BP:       Pulse: 74  74    Resp: 18  22    Temp:    99.7 °F (37.6 °C)   TempSrc:    Axillary   SpO2: 95%  95%    Weight:  246 lb 4.1 oz (111.7 kg)     Height:           Physical Exam:  General-Intubated. Neck- supple, no JVD  CV- regular rate and rhythm no MRG  Lung- clear bilaterally  Abd- soft, nontender, nondistended  Ext- 1+ edema bilaterally. Skin- warm and dry  Psychiatric:  Unable to accurately assess due to intubated and sedated status. Neurologic:  Unable to accurately assess due to intubated and sedated status. Data Review:   Recent Labs     03/21/23  0301 03/21/23  0304 03/22/23  0346   *  --  144*   K 4.0  --  4.6   MG 2.6*  --  2.7*   BUN 39*  --  51*   WBC  --  6.8 7.7   HGB  --  9.1* 8.9*   HCT  --  27.7* 28.7*   PLT  --  152 197         TELEMETRY:  SR    Assessment/Plan:     Principal Problem:    NSTEMI (non-ST elevated [facility]) ([facility]  Plan: Patient with complex coronary artery disease now status post coronary bypass grafting. Had Impella explanted yesterday. Continue ASA 81 mg a day    Active Problems:    ANNMARIE (acute kidney injury) ([facility]  Plan: Worse today. Hold further diuresis today      Cardiac arrest [facility])  Plan: Secondary to polymorphic VT in the face of ischemia. Will monitor closely. Continue amiodarone. Acute respiratory failure with hypoxia ([facility]  Plan: Patient is sedated and intubated.   Wean sedation and extubate as tolerated. Diabetes mellitus type 1.5 (HCC)  Plan: Manage per primary team.  Patient currently on insulin drip. Essential hypertension  Plan: BP stable. On Lopressor. Hyperlipidemia  Plan: Conitnue Lipitor    Check echo to see post op LV function given complex stay and events as above.        Hilda Coombs MD  3/22/2023 7:03 AM

## 2023-03-22 NOTE — PROGRESS NOTES
IntraVENous PRN    ondansetron (ZOFRAN) injection 4 mg  4 mg IntraVENous Q4H PRN    aspirin EC tablet 81 mg  81 mg Oral Daily    oxyCODONE-acetaminophen (PERCOCET) 5-325 MG per tablet 1 tablet  1 tablet Oral Q4H PRN    morphine sulfate (PF) injection 3 mg  3 mg IntraVENous Q1H PRN    Or    morphine sulfate (PF) injection 4 mg  4 mg IntraVENous Q1H PRN    amiodarone (CORDARONE) tablet 200 mg  200 mg Oral BID    chlorhexidine (PERIDEX) 0.12 % solution 10 mL  10 mL Mouth/Throat BID    midazolam PF (VERSED) injection 1 mg  1 mg IntraVENous Q1H PRN    atorvastatin (LIPITOR) tablet 80 mg  80 mg Oral Nightly    famotidine (PEPCID) tablet 20 mg  20 mg Oral Daily    Or    famotidine (PEPCID) 20 mg in sodium chloride (PF) 0.9 % 10 mL injection  20 mg IntraVENous Daily    potassium chloride 20 mEq/50 mL IVPB (Central Line)  20 mEq IntraVENous PRN    magnesium sulfate 1000 mg in dextrose 5% 100 mL IVPB  1,000 mg IntraVENous PRN    phenylephrine (RENETTA-SYNEPHRINE) 50 mg in sodium chloride 0.9 % 250 mL infusion ([id number])   mcg/min IntraVENous Continuous PRN    EPINEPHrine (EPINEPHrine HCL) 1 mg/mL 5 mg in sodium chloride 0.9 % 250 mL infusion  0.1-0.3 mcg/kg/min IntraVENous Continuous PRN    nitroGLYCERIN 200 mcg/ml in dextrose 5%  0-100 mcg/min IntraVENous Continuous PRN    norepinephrine (LEVOPHED) 16 mg in sodium chloride 0.9 % 250 mL infusion  0.01-3.3 mcg/kg/min IntraVENous Continuous PRN    fentaNYL (SUBLIMAZE) 1,000 mcg in sodium chloride 0.9% 100 mL infusion   mcg/hr IntraVENous Continuous    midazolam (VERSED) 1 mg/mL in NS infusion  1-10 mg/hr IntraVENous Continuous    [Held by provider] DOPamine (INTROPIN) 800 mg in dextrose 5 % 250 mL infusion  1-20 mcg/kg/min IntraVENous Continuous    levothyroxine (SYNTHROID) tablet 175 mcg  175 mcg Oral QAM AC    insulin glargine (LANTUS) injection vial 5 Units  5 Units SubCUTAneous Daily    amiodarone (CORDARONE) 450 mg in dextrose 5 % 250 mL infusion ([id number])  0.5

## 2023-03-22 NOTE — PROGRESS NOTES
Ventilator check complete; patient has a #7.5 ET tube secured at the 25 at the teeth. Patient is  sedated. Patient is not able to follow commands. Breath sounds are diminished and rhonchi. Trachea is midline, Negative for subcutaneous air, and chest excursion is symmetric. Patient is also Negative for cyanosis and is Negative for pitting edema. All alarms are set and audible. Resuscitation bag is  at the head of the bed.       Ventilator Settings  Mode FIO2 Rate Tidal Volume Pressure PEEP I:E Ratio   SIMV/PRVC  35 %   12   500 15 cm H2O    8cm H2O      Peak airway pressure:   24  Minute ventilation:   13.2        meghna mota, RANJEETP

## 2023-03-22 NOTE — DIABETES MGMT
Patient admitted with NSTEMI. Blood glucose ranged  yesterday with patient on insulin gtt and receiving Lantus 5 units. Blood glucose this morning was 297. Creatinine 1.90. GFR 39. Reviewed patient current regimen: Lantus 5 units daily and Humalog correctional insulin. Patient NPO, per chart review on vent. Noted insulin gtt averaging 2-3 units per hour yesterday and patient weight 111.7kg. Patient would likely benefit from an increase in basal insulin as fasting blood glucose is not at goal. Provider updated via [employer] regarding recommendations and patient glycemic control.

## 2023-03-22 NOTE — PROGRESS NOTES
Case management following for d/c planning. LOS Day 7. Per report,   Pt had impella explanted yesterday. Remains intubated. Versed weaned off. Now on fentanyl 75 and precedex 0.2. Currently on PS 10/8 with FiO2 35%. Vent day 7. CM will follow patient's plan of care and assist with supportive care referrals pending patient's clinical progress. Please consult case management if specific needs arise.

## 2023-03-22 NOTE — PROGRESS NOTES
Dr Licona Mom updated on pt's progress toward extubation.  Stated to give pt more time and use Precedex for anxiety

## 2023-03-22 NOTE — PROGRESS NOTES
MSSA/MRSA Screen BY PCR [[id number]]  (Abnormal) Collected: 03/15/23 1543    Order Status: Completed Specimen: Swab from Nares Updated: 03/15/23 1759     Special Requests NO SPECIAL REQUESTS        Culture       MRSA target DNA not detected, SA target DNA detected. A MRSA negative, SA positive test result does not preclude MRSA nasal colonization. Respiratory Panel, Molecular, with COVID-19 (Restricted: peds pts or suitable admitted adults) [[id number]] Collected: 03/15/23 1542    Order Status: Completed Specimen: Nasopharyngeal Updated: 03/15/23 1743     Adenovirus by PCR NOT DETECTED        Coronavirus 229E by PCR NOT DETECTED        Coronavirus HKU1 by PCR NOT DETECTED        Coronavirus NL63 by PCR NOT DETECTED        Coronavirus OC43 by PCR NOT DETECTED        SARS-CoV-2, PCR NOT DETECTED        Human Metapneumovirus by PCR NOT DETECTED        Rhinovirus Enterovirus PCR NOT DETECTED        Influenza A by PCR NOT DETECTED        Influenza B PCR NOT DETECTED        Parainfluenza 1 PCR NOT DETECTED        Parainfluenza 2 PCR NOT DETECTED        Parainfluenza 3 PCR NOT DETECTED        Parainfluenza 4 PCR NOT DETECTED        Respiratory Syncytial Virus by PCR NOT DETECTED        Bordetella parapertussis by PCR NOT DETECTED        Bordetella pertussis by PCR NOT DETECTED        Chlamydophila Pneumonia PCR NOT DETECTED        Mycoplasma pneumo by PCR NOT DETECTED       Influenza A/B, Molecular [[id number]]     Order Status: Canceled Specimen: Nasal     Influenza A/B, Molecular [[id number]] Collected: 03/15/23 1057    Order Status: Completed Specimen: Not Specified Updated: 03/15/23 1121     Influenza A, JONY Not detected        Comment: Negative results do not preclude infection with influenza virus and should not be the sole basis of a patient treatment decision.         Influenza B, JONY Not detected       COVID-19, Rapid [[id number]] Collected: 03/15/23 1057    Order Status: Completed Specimen: Nasopharyngeal Updated: 03/15/23 1121     Source Nasopharyngeal        SARS-CoV-2, Rapid Not detected        Comment:      The specimen is NEGATIVE for SARS-CoV-2, the novel coronavirus associated with COVID-19. A negative result does not rule out COVID-19. This test has been authorized by the FDA under an Emergency Use Authorization (EUA) for use by authorized laboratories. Fact sheet for Healthcare Providers: [employer]  Fact sheet for Patients: [employer]       Methodology: Isothermal Nucleic Acid Amplification               Ventilator Settings Ideal body weight: 77.6 kg (171 lb 1.2 oz)  Adjusted ideal body weight: 91.2 kg (201 lb 2.4 oz)  Mode FIO2 Rate Tidal Volume Pressure   CPAP/PS    35 %  10 bmp     0.5 mL   8     Peak airway pressure:     Minute ventilation:    ABG:  Recent Labs     03/20/23  0216 03/21/23  0256 03/22/23  0421   PHAPOC 7.47* 7.45 7.45   QWM0CIMP 34.0* 34.8* 30.2*   AF1AZNN 126* 73* 68*   CLP8ROT 24.8 24.3 21.1*   BE 1.2 0.5  --      Assessment and Plan:  (Medical Decision Making)   Impression: 74 y.o. male with CAD, s/p emergent CABG in setting of CAD and cardiac arrest. . NEURO:   Sedation: off versed. Precedex. Analgesia: fentanyl, wean with goal off today if able. CV:   Volume Status: appears euvolemic. CAD: s/p cabg 3/17. PULM:   Acute hypoxemic/hypercapneic respiratory failure:  down to 35 % on vent. PS 10/8. If can get him awake and following commands can likely be extubated. Pleural effusion: R> L on CXR today. Will need to eval with US when he can sit up. RENAL:  ANNMARIE: improved overall though creatinine up to 1.9. Electrolytes: replace as needed  GI:   Nutrition: start tube feeds today. HEME:   Anemia: mild. Transfuse for < 7.    Anticoagulation: none  ID:   No issues:   ENDO:   DM: glargine, ssi  Skin: no decub, turns, preventive care  Prophy: scd's, ppi    Full Code    The patient is

## 2023-03-23 ENCOUNTER — APPOINTMENT (OUTPATIENT)
Dept: GENERAL RADIOLOGY | Age: 68
DRG: 215 | End: 2023-03-23
Payer: COMMERCIAL

## 2023-03-23 LAB
ANION GAP SERPL CALC-SCNC: 3 MMOL/L (ref 2–11)
ARTERIAL PATENCY WRIST A: POSITIVE
BACTERIA SPEC CULT: NORMAL
BACTERIA SPEC CULT: NORMAL
BASE DEFICIT BLD-SCNC: 0.2 MMOL/L
BDY SITE: ABNORMAL
BUN SERPL-MCNC: 54 MG/DL (ref 8–23)
CALCIUM SERPL-MCNC: 8.7 MG/DL (ref 8.3–10.4)
CHLORIDE SERPL-SCNC: 116 MMOL/L (ref 101–110)
CO2 SERPL-SCNC: 26 MMOL/L (ref 21–32)
CREAT SERPL-MCNC: 1.8 MG/DL (ref 0.8–1.5)
EKG ATRIAL RATE: 74 BPM
EKG DIAGNOSIS: NORMAL
EKG P AXIS: 45 DEGREES
EKG P-R INTERVAL: 192 MS
EKG Q-T INTERVAL: 400 MS
EKG QRS DURATION: 102 MS
EKG QTC CALCULATION (BAZETT): 444 MS
EKG R AXIS: -24 DEGREES
EKG T AXIS: 25 DEGREES
EKG VENTRICULAR RATE: 74 BPM
GAS FLOW.O2 O2 DELIVERY SYS: ABNORMAL
GLUCOSE BLD STRIP.AUTO-MCNC: 297 MG/DL (ref 65–100)
GLUCOSE BLD STRIP.AUTO-MCNC: 302 MG/DL (ref 65–100)
GLUCOSE BLD STRIP.AUTO-MCNC: 336 MG/DL (ref 65–100)
GLUCOSE BLD STRIP.AUTO-MCNC: 360 MG/DL (ref 65–100)
GLUCOSE BLD STRIP.AUTO-MCNC: 371 MG/DL (ref 65–100)
GLUCOSE BLD STRIP.AUTO-MCNC: 372 MG/DL (ref 65–100)
GLUCOSE SERPL-MCNC: 376 MG/DL (ref 65–100)
HCO3 BLD-SCNC: 23.2 MMOL/L (ref 22–26)
MAGNESIUM SERPL-MCNC: 3.1 MG/DL (ref 1.8–2.4)
O2/TOTAL GAS SETTING VFR VENT: 35 %
PCO2 BLD: 32.4 MMHG (ref 35–45)
PEEP RESPIRATORY: 8 CMH2O
PH BLD: 7.46 (ref 7.35–7.45)
PHOSPHATE SERPL-MCNC: 3.1 MG/DL (ref 2.3–3.7)
PO2 BLD: 97 MMHG (ref 75–100)
POTASSIUM SERPL-SCNC: 4.8 MMOL/L (ref 3.5–5.1)
PRESSURE SUPPORT SETTING VENT: 15 CMH2O
RESPIRATORY RATE, POC: 20 (ref 5–40)
SAO2 % BLD: 98 % (ref 95–98)
SERVICE CMNT-IMP: ABNORMAL
SERVICE CMNT-IMP: NORMAL
SERVICE CMNT-IMP: NORMAL
SODIUM SERPL-SCNC: 145 MMOL/L (ref 133–143)
SPECIMEN TYPE: ABNORMAL
VENTILATION MODE VENT: ABNORMAL
VT SETTING VENT: 500 ML

## 2023-03-23 PROCEDURE — 2500000003 HC RX 250 WO HCPCS: Performed by: THORACIC SURGERY (CARDIOTHORACIC VASCULAR SURGERY)

## 2023-03-23 PROCEDURE — 83735 ASSAY OF MAGNESIUM: CPT

## 2023-03-23 PROCEDURE — 80048 BASIC METABOLIC PNL TOTAL CA: CPT

## 2023-03-23 PROCEDURE — 2100000000 HC CCU R&B

## 2023-03-23 PROCEDURE — 97162 PT EVAL MOD COMPLEX 30 MIN: CPT

## 2023-03-23 PROCEDURE — 6370000000 HC RX 637 (ALT 250 FOR IP): Performed by: THORACIC SURGERY (CARDIOTHORACIC VASCULAR SURGERY)

## 2023-03-23 PROCEDURE — 93005 ELECTROCARDIOGRAM TRACING: CPT | Performed by: THORACIC SURGERY (CARDIOTHORACIC VASCULAR SURGERY)

## 2023-03-23 PROCEDURE — 6360000002 HC RX W HCPCS: Performed by: THORACIC SURGERY (CARDIOTHORACIC VASCULAR SURGERY)

## 2023-03-23 PROCEDURE — 84100 ASSAY OF PHOSPHORUS: CPT

## 2023-03-23 PROCEDURE — 6370000000 HC RX 637 (ALT 250 FOR IP): Performed by: INTERNAL MEDICINE

## 2023-03-23 PROCEDURE — 36573 INSJ PICC RS&I 5 YR+: CPT

## 2023-03-23 PROCEDURE — 99232 SBSQ HOSP IP/OBS MODERATE 35: CPT | Performed by: INTERNAL MEDICINE

## 2023-03-23 PROCEDURE — 2700000000 HC OXYGEN THERAPY PER DAY

## 2023-03-23 PROCEDURE — 2580000003 HC RX 258: Performed by: THORACIC SURGERY (CARDIOTHORACIC VASCULAR SURGERY)

## 2023-03-23 PROCEDURE — 2580000003 HC RX 258: Performed by: INTERNAL MEDICINE

## 2023-03-23 PROCEDURE — 99291 CRITICAL CARE FIRST HOUR: CPT | Performed by: INTERNAL MEDICINE

## 2023-03-23 PROCEDURE — 82803 BLOOD GASES ANY COMBINATION: CPT

## 2023-03-23 PROCEDURE — 6370000000 HC RX 637 (ALT 250 FOR IP): Performed by: PHYSICIAN ASSISTANT

## 2023-03-23 PROCEDURE — 6360000002 HC RX W HCPCS: Performed by: INTERNAL MEDICINE

## 2023-03-23 PROCEDURE — 71045 X-RAY EXAM CHEST 1 VIEW: CPT

## 2023-03-23 PROCEDURE — 97530 THERAPEUTIC ACTIVITIES: CPT

## 2023-03-23 PROCEDURE — 2580000003 HC RX 258: Performed by: NURSE PRACTITIONER

## 2023-03-23 PROCEDURE — 94761 N-INVAS EAR/PLS OXIMETRY MLT: CPT

## 2023-03-23 PROCEDURE — 94003 VENT MGMT INPAT SUBQ DAY: CPT

## 2023-03-23 PROCEDURE — C9113 INJ PANTOPRAZOLE SODIUM, VIA: HCPCS | Performed by: INTERNAL MEDICINE

## 2023-03-23 PROCEDURE — A4216 STERILE WATER/SALINE, 10 ML: HCPCS | Performed by: INTERNAL MEDICINE

## 2023-03-23 PROCEDURE — 82962 GLUCOSE BLOOD TEST: CPT

## 2023-03-23 PROCEDURE — 6370000000 HC RX 637 (ALT 250 FOR IP): Performed by: NURSE PRACTITIONER

## 2023-03-23 PROCEDURE — 36600 WITHDRAWAL OF ARTERIAL BLOOD: CPT

## 2023-03-23 PROCEDURE — C1751 CATH, INF, PER/CENT/MIDLINE: HCPCS

## 2023-03-23 RX ORDER — SODIUM CHLORIDE 0.9 % (FLUSH) 0.9 %
5-40 SYRINGE (ML) INJECTION PRN
Status: DISCONTINUED | OUTPATIENT
Start: 2023-03-23 | End: 2023-03-24 | Stop reason: SDUPTHER

## 2023-03-23 RX ORDER — SODIUM CHLORIDE 0.9 % (FLUSH) 0.9 %
5-40 SYRINGE (ML) INJECTION EVERY 12 HOURS SCHEDULED
Status: DISCONTINUED | OUTPATIENT
Start: 2023-03-23 | End: 2023-03-27 | Stop reason: HOSPADM

## 2023-03-23 RX ORDER — INSULIN GLARGINE 100 [IU]/ML
22 INJECTION, SOLUTION SUBCUTANEOUS 2 TIMES DAILY
Status: DISCONTINUED | OUTPATIENT
Start: 2023-03-23 | End: 2023-03-24

## 2023-03-23 RX ORDER — SODIUM CHLORIDE 0.9 % (FLUSH) 0.9 %
5-40 SYRINGE (ML) INJECTION EVERY 12 HOURS SCHEDULED
Status: DISCONTINUED | OUTPATIENT
Start: 2023-03-23 | End: 2023-03-24 | Stop reason: SDUPTHER

## 2023-03-23 RX ORDER — SODIUM CHLORIDE 9 MG/ML
25 INJECTION, SOLUTION INTRAVENOUS PRN
Status: DISCONTINUED | OUTPATIENT
Start: 2023-03-23 | End: 2023-03-24 | Stop reason: SDUPTHER

## 2023-03-23 RX ORDER — LIDOCAINE HYDROCHLORIDE 10 MG/ML
5 INJECTION, SOLUTION EPIDURAL; INFILTRATION; INTRACAUDAL; PERINEURAL ONCE
Status: DISCONTINUED | OUTPATIENT
Start: 2023-03-23 | End: 2023-03-24

## 2023-03-23 RX ORDER — FUROSEMIDE 10 MG/ML
20 INJECTION INTRAMUSCULAR; INTRAVENOUS ONCE
Status: COMPLETED | OUTPATIENT
Start: 2023-03-23 | End: 2023-03-23

## 2023-03-23 RX ORDER — INSULIN GLARGINE 100 [IU]/ML
16 INJECTION, SOLUTION SUBCUTANEOUS 2 TIMES DAILY
Status: DISCONTINUED | OUTPATIENT
Start: 2023-03-23 | End: 2023-03-23

## 2023-03-23 RX ORDER — SODIUM CHLORIDE 9 MG/ML
25 INJECTION, SOLUTION INTRAVENOUS PRN
Status: DISCONTINUED | OUTPATIENT
Start: 2023-03-23 | End: 2023-03-27 | Stop reason: HOSPADM

## 2023-03-23 RX ADMIN — AMIODARONE HYDROCHLORIDE 200 MG: 200 TABLET ORAL at 22:11

## 2023-03-23 RX ADMIN — INSULIN LISPRO 4 UNITS: 100 INJECTION, SOLUTION INTRAVENOUS; SUBCUTANEOUS at 05:45

## 2023-03-23 RX ADMIN — AMIODARONE HYDROCHLORIDE 200 MG: 200 TABLET ORAL at 11:08

## 2023-03-23 RX ADMIN — ACETAMINOPHEN 650 MG: 325 TABLET, FILM COATED ORAL at 11:42

## 2023-03-23 RX ADMIN — LEVOTHYROXINE SODIUM 175 MCG: 50 TABLET ORAL at 05:56

## 2023-03-23 RX ADMIN — Medication 1 AMPULE: at 08:18

## 2023-03-23 RX ADMIN — CHLORHEXIDINE GLUCONATE 10 ML: 1.2 SOLUTION ORAL at 08:05

## 2023-03-23 RX ADMIN — ALLOPURINOL 100 MG: 100 TABLET ORAL at 11:10

## 2023-03-23 RX ADMIN — FENTANYL CITRATE 100 MCG/HR: 50 INJECTION, SOLUTION INTRAMUSCULAR; INTRAVENOUS at 03:15

## 2023-03-23 RX ADMIN — ASPIRIN 81 MG: 81 TABLET ORAL at 11:07

## 2023-03-23 RX ADMIN — PANTOPRAZOLE SODIUM 40 MG: 40 INJECTION, POWDER, LYOPHILIZED, FOR SOLUTION INTRAVENOUS at 08:05

## 2023-03-23 RX ADMIN — METOPROLOL TARTRATE 25 MG: 25 TABLET, FILM COATED ORAL at 22:10

## 2023-03-23 RX ADMIN — FAMOTIDINE 20 MG: 20 TABLET, FILM COATED ORAL at 11:09

## 2023-03-23 RX ADMIN — CHLORHEXIDINE GLUCONATE 10 ML: 1.2 SOLUTION ORAL at 22:11

## 2023-03-23 RX ADMIN — SODIUM CHLORIDE, PRESERVATIVE FREE 20 ML: 5 INJECTION INTRAVENOUS at 08:30

## 2023-03-23 RX ADMIN — SODIUM CHLORIDE, PRESERVATIVE FREE 10 ML: 5 INJECTION INTRAVENOUS at 22:12

## 2023-03-23 RX ADMIN — INSULIN GLARGINE 16 UNITS: 100 INJECTION, SOLUTION SUBCUTANEOUS at 11:43

## 2023-03-23 RX ADMIN — ACETAMINOPHEN 650 MG: 325 TABLET, FILM COATED ORAL at 17:02

## 2023-03-23 RX ADMIN — INSULIN GLARGINE 22 UNITS: 100 INJECTION, SOLUTION SUBCUTANEOUS at 22:12

## 2023-03-23 RX ADMIN — FUROSEMIDE 20 MG: 10 INJECTION, SOLUTION INTRAMUSCULAR; INTRAVENOUS at 08:06

## 2023-03-23 RX ADMIN — INSULIN LISPRO 3 UNITS: 100 INJECTION, SOLUTION INTRAVENOUS; SUBCUTANEOUS at 17:10

## 2023-03-23 RX ADMIN — INSULIN GLARGINE 5 UNITS: 100 INJECTION, SOLUTION SUBCUTANEOUS at 08:04

## 2023-03-23 RX ADMIN — ATORVASTATIN CALCIUM 80 MG: 80 TABLET, FILM COATED ORAL at 22:10

## 2023-03-23 RX ADMIN — METOPROLOL TARTRATE 25 MG: 25 TABLET, FILM COATED ORAL at 11:06

## 2023-03-23 RX ADMIN — SODIUM CHLORIDE, PRESERVATIVE FREE 10 ML: 5 INJECTION INTRAVENOUS at 22:11

## 2023-03-23 RX ADMIN — INSULIN LISPRO 4 UNITS: 100 INJECTION, SOLUTION INTRAVENOUS; SUBCUTANEOUS at 11:43

## 2023-03-23 RX ADMIN — Medication 1 AMPULE: at 22:10

## 2023-03-23 RX ADMIN — SODIUM CHLORIDE 1 MCG/KG/HR: 9 INJECTION, SOLUTION INTRAVENOUS at 03:33

## 2023-03-23 ASSESSMENT — PULMONARY FUNCTION TESTS
PIF_VALUE: 20
PIF_VALUE: 24
PIF_VALUE: 23
PIF_VALUE: 19
PIF_VALUE: 23
PIF_VALUE: 18
PIF_VALUE: 21
PIF_VALUE: 18

## 2023-03-23 ASSESSMENT — PAIN SCALES - GENERAL
PAINLEVEL_OUTOF10: 0

## 2023-03-23 NOTE — PROGRESS NOTES
Respiratory Mechanics completed and documented in Flowsheets. Patient extubated to a 40L/40% HFNC. Patient is  able to communicate and is  negative for stridor. Breath sounds are diminished. No complications with extubation.      Fannie Hope, RANJEETP

## 2023-03-23 NOTE — PROGRESS NOTES
Continuous    [Held by provider] DOPamine (INTROPIN) 800 mg in dextrose 5 % 250 mL infusion  1-20 mcg/kg/min IntraVENous Continuous    levothyroxine (SYNTHROID) tablet 175 mcg  175 mcg Oral QAM AC    insulin glargine (LANTUS) injection vial 5 Units  5 Units SubCUTAneous Daily    amiodarone (CORDARONE) 450 mg in dextrose 5 % 250 mL infusion ([id number])  0.5 mg/min IntraVENous Continuous    propofol injection  5-50 mcg/kg/min IntraVENous Continuous    fentaNYL (SUBLIMAZE) injection 50 mcg  50 mcg IntraVENous Q1H PRN    midazolam (VERSED) injection 2 mg  2 mg IntraVENous Q1H PRN    pantoprazole (PROTONIX) 40 mg in sodium chloride (PF) 0.9 % 10 mL injection  40 mg IntraVENous Daily    glucose chewable tablet 16 g  4 tablet Oral PRN    dextrose bolus 10% 125 mL  125 mL IntraVENous PRN    Or    dextrose bolus 10% 250 mL  250 mL IntraVENous PRN    glucagon (rDNA) injection 1 mg  1 mg SubCUTAneous PRN    dextrose 10 % infusion   IntraVENous Continuous PRN    [Held by provider] fenofibrate (TRIGLIDE) tablet 160 mg  160 mg Oral Daily    sodium chloride flush 0.9 % injection 5-40 mL  5-40 mL IntraVENous 2 times per day    sodium chloride flush 0.9 % injection 5-40 mL  5-40 mL IntraVENous PRN    0.9 % sodium chloride infusion   IntraVENous PRN    ondansetron (ZOFRAN-ODT) disintegrating tablet 4 mg  4 mg Oral Q8H PRN    Or    ondansetron (ZOFRAN) injection 4 mg  4 mg IntraVENous Q6H PRN    polyethylene glycol (GLYCOLAX) packet 17 g  17 g Oral Daily PRN    nitroGLYCERIN (NITROSTAT) SL tablet 0.4 mg  0.4 mg SubLINGual Q5 Min PRN    potassium chloride (KLOR-CON M) extended release tablet 40 mEq  40 mEq Oral PRN    Or    potassium bicarb-citric acid (EFFER-K) effervescent tablet 40 mEq  40 mEq Oral PRN    Or    potassium chloride 10 mEq/100 mL IVPB (Peripheral Line)  10 mEq IntraVENous PRN    magnesium sulfate 2000 mg in 50 mL IVPB premix  2,000 mg IntraVENous PRN    aluminum & magnesium hydroxide-simethicone (MAALOX) 179-205-97 MG/5ML suspension 30 mL  30 mL Oral Q6H PRN    0.9 % sodium chloride infusion   IntraVENous Continuous       EXAM  GEN : extubated , not in resp. Distress   HEENT: anicteric sclerae, normocephalic, ETT in place  Neck - trachea midline, atraumatic  Lung - equal chest rise, decreased BS   Ext - no cyanosis, +edema  Skin - no rashes, no purpura       Recent Labs     03/21/23  0304 03/22/23  0346   WBC 6.8 7.7   HGB 9.1* 8.9*   HCT 27.7* 28.7*    197          Recent Labs     03/21/23  0301 03/22/23  0346 03/23/23  0436   * 144* 145*   K 4.0 4.6 4.8   * 114* 116*   CO2 26 24 26   BUN 39* 51* 54*   CREATININE 1.40 1.90* 1.80*   GLUCOSE 105* 272* 376*   CALCIUM 8.0* 8.2* 8.7           Lab Results   Component Value Date    CALCIUM 8.7 03/23/2023     Lab Results   Component Value Date    LABALBU 2.6 (L) 03/16/2023         Assessment and Plan:   ANNMARIE on CKD 3b  - ANNMARIE consistent with ATN from cardiac arrest and CABG. Contrast exposure   Volume overload: better   non oliguric. Creat is stable   Hold Lasix today       DM2 -     Hypernatremia - mild,     ACS / 2 cardiac arrests / CABG x 4 / Impella    Resp.  Failure; extubated today          Barney Leon MD  [facility] Nephrology

## 2023-03-23 NOTE — PROGRESS NOTES
[facility] Critical Care Note[de-identified] 3/23/2023  Liat Doran  Admission Date: 3/15/2023     Length of Stay: 8 days    Background: 74 y.o. male with  a h/o CAD with inferior ST elevations, multivessel coronary disease with PCI of 3 obtuse marginals in 2005, Mobitz type I second-degree AV block, diabetes mellitus, hyperlipidemia who was admitted to [address] [place] [address] on 3/15/2023 with chest pain. 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 EF was 40 to 45% and the LVEDP was 19.     3/18 he had a CODE BLUE arrest at about 5 PM for R on T provoking ventricular tachycardia / torsades pattern. He was quickly defibrillated at 200J which did not lead to palpable pulses and asystole appeared to be present on the bedside monitor. CPR was  initiated and 1 mg of epinephrine was given in addition to an amp of bicarbonate. Magnesium 2 mg was given initially. 300 mg bolus of amiodarone was pushed and pulse was regained within 5 minutes. Initial neurologic status was nonfocal and minimally responsive,breathing was apneic with coarse breath sounds on the left. Intubation was performed and the patient was transferred to the CCU. Neurologic status improved over the following 30 minutes during this transfer and purposeful movements were noted, patient clearly awake. Dr. Rabia Marmolejo was present throughout the acute management during and immediately post-arrest.  We discussed hypothermia and felt his neurologic responses and extremely brief time to ROSC obviated need for this. In the CCU, sedation was started for central line placement and amiodarone infusion was ongoing. Several brief runs of ventricular tachycardia occurred and lidocaine 50mg was given. Pulses were maintained throughout.   CVC was placed in Left IJ and patient was sedated with fentanyl 7045     Special Requests --        RIGHT  HAND       Culture NO GROWTH 5 DAYS       Culture, Respiratory [[id number]]  (Abnormal)  (Susceptibility) Collected: 03/18/23 0755    Order Status: Completed Specimen: Endotracheal Updated: 03/21/23 0702     Special Requests NO SPECIAL REQUESTS        Gram stain 3 TO 30 WBCS SEEN /OIF      0 EPITHELIAL CELLS SEEN      FEW Gram positive cocci         FEW GRAM POSITIVE RODS         3+ MUCOUS        Culture       MODERATE Staphylococcus aureus                  MODERATE NORMAL RESPIRATORY MORENA          Susceptibility        Staphylococcus aureus      BACTERIAL SUSCEPTIBILITY PANEL COLLEEN      ceFAZolin <=2 ug/mL Sensitive      clindamycin <=0.5 ug/mL Sensitive      oxacillin 0.5 ug/mL Sensitive      rifampin <=0.5 ug/mL Sensitive  [1]       tetracycline <=0.5 ug/mL Sensitive      trimethoprim-sulfamethoxazole <=0.5/9.5 ug/mL Sensitive      vancomycin 1 ug/mL Sensitive                   [1]  Rifampin is not to be used for mono-therapy. Culture, Blood 1 [[id number]] Collected: 03/18/23 0754    Order Status: Completed Specimen: Blood Updated: 03/23/23 0756     Special Requests --        LEFT  HAND  ARTL (ART LINE)       Culture NO GROWTH 5 DAYS       Culture, Urine [[id number]] Collected: 03/18/23 0744    Order Status: Completed Specimen: Urine Updated: 03/20/23 0906     Special Requests NO SPECIAL REQUESTS        Culture NO GROWTH 2 DAYS       MSSA/MRSA Screen BY PCR [[id number]]  (Abnormal) Collected: 03/16/23 2038    Order Status: Completed Specimen: Swab from Nares Updated: 03/17/23 0620     Special Requests NO SPECIAL REQUESTS        Culture       MRSA target DNA not detected, SA target DNA detected. A MRSA negative, SA positive test result does not preclude MRSA nasal colonization.           MSSA/MRSA Screen BY PCR [[id number]]  (Abnormal) Collected: 03/15/23 1543    Order Status: Completed Specimen: Swab from Nares Updated: 03/15/23 1759     Special

## 2023-03-23 NOTE — PROGRESS NOTES
POD 2 Days Post-Op    Procedure:  Procedure(s):  EXPLANT OF IMPELLA      Subjective: Follows commands      Objective:     Patient Vitals for the past 8 hrs:   BP Temp Temp src Pulse Resp SpO2   23 0602 (!) 141/67 -- -- 64 14 96 %   23 0502 (!) 142/66 -- -- 66 13 97 %   23 0455 -- -- -- 63 17 97 %   23 0402 114/62 -- -- 63 23 97 %   23 0302 116/64 98.3 °F (36.8 °C) Axillary 63 26 97 %   23 0202 (!) 113/59 -- -- 64 17 97 %   23 0102 (!) 112/57 -- -- 64 19 --   23 0020 -- -- -- 64 18 97 %   23 0002 121/64 97.9 °F (36.6 °C) Axillary 64 24 --     Temp (24hrs), Av °F (37.2 °C), Min:97.9 °F (36.6 °C), Max:100 °F (37.8 °C)        Hemodynamics    PAP    CO    CI    No intake/output data recorded.  1901 -  0700  In: 1940.7 [I.V.:1727.7]  Out: 6381 [Urine:1830]    CT Drainage              total of all CT's    Heart:  regular rate and rhythm, S1, S2 normal, no murmur, click, rub or gallop  Lung:  clear to auscultation bilaterally  Neuro: Grossly non focal  Incisions: Clean, dry, and intact    Labs:  Recent Results (from the past 24 hour(s))   POCT Glucose    Collection Time: 23  7:47 AM   Result Value Ref Range    POC Glucose 297 (H) 65 - 100 mg/dL    Performed by: Fawn.     EKG 12 lead    Collection Time: 23  8:08 AM   Result Value Ref Range    Ventricular Rate 74 BPM    Atrial Rate 74 BPM    P-R Interval 202 ms    QRS Duration 92 ms    Q-T Interval 414 ms    QTc Calculation (Bazett) 459 ms    P Axis 66 degrees    R Axis -28 degrees    T Axis 47 degrees    Diagnosis       Sinus rhythm with occasional Premature ventricular complexes   Transthoracic echocardiogram (TTE) complete with contrast, bubble, strain, and 3D PRN    Collection Time: 23  9:33 AM   Result Value Ref Range    LV EDV A2C 140 mL    LV EDV A4C 153 mL    LV ESV A2C 62 mL    LV ESV A4C 68 mL    IVSd 0.9 0.6 - 1.0 cm    LVIDd 5.2 4.2 - 5.9 cm    LVIDs 3.8 cm    LVOT Diameter 2.0 cm    LVOT Mean Gradient 3 mmHg    LVOT VTI 20.7 cm    LVOT Peak Velocity 1.1 m/s    LVOT Peak Gradient 5 mmHg    LVPWd 1.1 (A) 0.6 - 1.0 cm    LV E' Lateral Velocity 9 cm/s    LV E' Septal Velocity 6 cm/s    LV Ejection Fraction A2C 56 %    LV Ejection Fraction A4C 56 %    EF BP 56 55 - 100 %    LVOT Area 3.1 cm2    LVOT SV 65.0 ml    LA Minor Axis 5.9 cm    LA Major Axis 5.9 cm    LA Area 2C 20.5 cm2    LA Area 4C 25.5 cm2    LA Volume 2C 60 (A) 18 - 58 mL    LA Volume 4C 88 (A) 18 - 58 mL    LA Volume BP 73 (A) 18 - 58 mL    AV Mean Velocity 1.4 m/s    AV Mean Gradient 9 mmHg    AV VTI 37.2 cm    AV Peak Velocity 2.0 m/s    AV Peak Gradient 16 mmHg    AV Area by VTI 1.8 cm2    AV Area by Peak Velocity 1.7 cm2    Aortic Root 3.3 cm    Ascending Aorta 3.2 cm    MV E Wave Deceleration Time 164.0 ms    MV A Velocity 1.00 m/s    MV E Velocity 1.16 m/s    MV Mean Gradient 2 mmHg    MV VTI 32.8 cm    MV Mean Velocity 0.7 m/s    MV Max Velocity 1.2 m/s    MV Peak Gradient 5 mmHg    MV Area by VTI 2.0 cm2    KS Max Velocity 1.0 m/s    Pulmonary Artery EDP 4 mmHg    PV .0 ms    PV Max Velocity 1.0 m/s    PV Peak Gradient 4 mmHg    RVIDd 2.8 cm    RV Basal Dimension 3.2 cm    RV Free Wall Peak S' 12 cm/s    TAPSE 1.6 (A) 1.7 cm    Body Surface Area 2.4 m2    Fractional Shortening 2D 27 28 - 44 %    LV ESV Index A4C 29 mL/m2    LV EDV Index A4C 66 mL/m2    LV ESV Index A2C 27 mL/m2    LV EDV Index A2C 60 mL/m2    LVIDd Index 2.23 cm/m2    LVIDs Index 1.63 cm/m2    LV RWT Ratio 0.42     LV Mass 2D 194.2 88 - 224 g    LV Mass 2D Index 83.3 49 - 115 g/m2    MV E/A 1.16     E/E' Ratio (Averaged) 16.11     E/E' Lateral 12.89     E/E' Septal 19.33     LA Volume Index BP 31 16 - 34 ml/m2    LVOT Stroke Volume Index 27.9 mL/m2    LA Volume Index 2C 26 16 - 34 mL/m2    LA Volume Index 4C 38 (A) 16 - 34 mL/m2    Ao Root Index 1.42 cm/m2    Ascending Aorta Index 1.37 cm/m2    AV Velocity Ratio 0.55     LVOT:AV VTI

## 2023-03-23 NOTE — PROGRESS NOTES
As the day has progressed, oriented to name, place and situation, and date. Speech is getting stronger, with good articulation, speaking of horrible dreams he had after surgery and teared up, reassured and explained sedating meds. Tolerating pudding, applesauce and albert of water without strangling. Encouraged and supported and continue to allow family in top see and encourage them. [facility], [place], aware of pt's -160's , low grade temp 100.2-100.6 and swallowing improvement.

## 2023-03-23 NOTE — PROGRESS NOTES
Pt alert to name, talking clearer now. Can tolerate applesauce and voice clear after each bite, meds given this way. Still gets strangled with a sip of H2O, will continue to monitor.

## 2023-03-23 NOTE — DIABETES MGMT
Patient s/p CABG. Blood glucose ranged 272-326 yesterday with patient receiving Lantus 5 units and Humalog 9 units. Blood glucose this morning was 371. Creatinine 1.80. GFR 41. Reviewed patient current regimen: Lantus 5 units daily and Humalog correctional insulin low dose. Per chart review patient was started on tube feedings yesterday. Patient would likely benefit from a significant increase in basal insulin to help improve glycemic control. Patient would also likely benefit from high dose correctional insulin instead of low dose to provide more basal correctional insulin. Provider updated via [employer] regarding recommendations and patient glycemic control.

## 2023-03-23 NOTE — PROGRESS NOTES
A follow up visit was made to the patient. Emotional support, spiritual presence and   prayer were provided for the patient. He was awake and talking.       Yari Palacios, [address] [facility], [facility]

## 2023-03-23 NOTE — PROGRESS NOTES
Ventilator check complete; patient has a #7.5 ET tube secured at the 26 at the teeth. Patient is  sedated. Patient is not able to follow commands. Breath sounds are coarse and diminished. Trachea is midline, Negative for subcutaneous air, and chest excursion is symmetric. Patient is also Negative for cyanosis and is Negative for pitting edema. All alarms are set and audible. Resuscitation bag is  at the head of the bed.       Ventilator Settings  Mode FIO2 Rate Tidal Volume Pressure PEEP I:E Ratio   SIMV/PRVC  35 %   10   500cc 15 cm H2O    8cm H2O      Peak airway pressure:   20  Minute ventilation:   13.2          meghna mota, RANJEETP

## 2023-03-23 NOTE — PROGRESS NOTES
3/23/2023 2:53 PM    Admit Date: 3/15/2023    Admit Diagnosis: STEMI (ST elevation myocardial infarction) ([facility].) [I21.3]  Acute myocardial infarction, subendocardial infarction, initial episode of care ([facility] [I21.4]      Subjective:   No chest pain or shortness of breath      Objective:    BP (!) 147/73   Pulse 89   Temp (!) 100.6 °F (38.1 °C)   Resp 24   Ht 6' (1.829 m)   Wt 246 lb 4.1 oz (111.7 kg)   SpO2 93%   BMI 33.40 kg/m²     Physical Exam:  Kady Jolly, Well Nourished, No Acute Distress, Alert & Oriented x 3, appropriate mood. Neck- supple, no JVD  CV- regular rate and rhythm no MRG  Lung- clear bilaterally  Abd- soft, nontender, nondistended  Ext- no edema bilaterally. Skin- warm and dry        Data Review:   Recent Labs     03/22/23  0346 03/23/23  0436   * 145*   K 4.6 4.8   BUN 51* 54*   WBC 7.7  --    HGB 8.9*  --    HCT 28.7*  --      --        Assessment/Plan:     Active Hospital Problems    ANNMARIE (acute kidney injury) (HCC)Stable. Continue current medical therapy. Acute pulmonary edema (HCC)      NSTEMI (non-ST elevated myocardial infarction) (HCC)-hemodynamically stable after CABG continue beta-blocker ambulation and incentive spirometry stressed      S/P CABG x 4      Pleural effusion      Acute myocardial infarction, subendocardial infarction, initial episode of care [facility])      History of coronary artery stent placement      Cardiac arrest ([facility]      Acute respiratory failure with hypoxia (HCC)      Ventricular arrhythmia-maintaining normal sinus rhythm. Continue amiodarone and close monitoring of electrolytes.           Essential hypertension      Hyperlipidemia      Diabetes mellitus type 1.5 ([facility]

## 2023-03-23 NOTE — PROGRESS NOTES
(Comments):   Orientation [x]     Vision [x]     Hearing [x]     Cognition  [x]       MOBILITY: I Mod I S SBA CGA Min Mod Max Total  NT x2 Comments:   Bed Mobility    Rolling [] [] [] [] [] [] [] [x] [] [] []    Supine to Sit [] [] [] [] [] [] [] [x] [] [] [x]    Scooting [] [] [] [] [] [] [] [x] [] [] [x]    Sit to Supine [] [] [] [] [] [] [] [] [] [x] []    Transfers    Sit to Stand [] [] [] [] [] [] [x] [x] [] [] [x]    Bed to Chair [] [] [] [] [] [] [x] [x] [] [] [x] Plus 1   Stand to Sit [] [] [] [] [] [] [x] [x] [] [] [x]     [] [] [] [] [] [] [] [] [] [] []    I=Independent, Mod I=Modified Independent, S=Supervision, SBA=Standby Assistance, CGA=Contact Guard Assistance,   Min=Minimal Assistance, Mod=Moderate Assistance, Max=Maximal Assistance, Total=Total Assistance, NT=Not Tested    GAIT: I Mod I S SBA CGA Min Mod Max Total  NT x2 Comments:   Level of Assistance [] [] [] [] [] [] [x] [x] [] [] [x] Be careful of knees buckling   Distance 4 steps      DME None    Gait Quality Decreased harshil , Decreased step clearance, and Decreased step length    Weightbearing Status      Stairs      I=Independent, Mod I=Modified Independent, S=Supervision, SBA=Standby Assistance, CGA=Contact Guard Assistance,   Min=Minimal Assistance, Mod=Moderate Assistance, Max=Maximal Assistance, Total=Total Assistance, NT=Not Tested    PLAN:   FREQUENCY AND DURATION: BID for duration of hospital stay or until stated goals are met, whichever comes first.    THERAPY PROGNOSIS: Good    PROBLEM LIST:   (Skilled intervention is medically necessary to address:)  Decreased Activity Tolerance  Decreased AROM/PROM  Decreased Balance  Decreased Gait Ability  Decreased Safety Awareness  Decreased Strength  Decreased Transfer Abilities INTERVENTIONS PLANNED:   (Benefits and precautions of physical therapy have been discussed with the patient.)  Therapeutic Activity  Therapeutic Exercise/HEP  Neuromuscular Re-education  Gait Training  Education TREATMENT:   EVALUATION: HIGH COMPLEXITY: (Untimed Charge)    TREATMENT:   Therapeutic Activity (14 Minutes): Therapeutic activity included Supine to Sit, Scooting, Transfer Training, Ambulation on level ground, Sitting balance , and Standing balance to improve functional Mobility. TREATMENT GRID:  N/A    AFTER TREATMENT PRECAUTIONS: Call light within reach, Chair, Needs within reach, RN notified, and Visitors at bedside    INTERDISCIPLINARY COLLABORATION:  RN/ PCT and PT/ PTA    EDUCATION: Education Given To: Patient; Family  Education Provided: Plan of Care;Role of Therapy  Education Method: Verbal  Barriers to Learning: None  Education Outcome: Verbalized understanding    TIME IN/OUT:  Time In: 1331  Time Out: 229 [facility]  Minutes: 291 Aris Gill PT

## 2023-03-23 NOTE — PROGRESS NOTES
PICC Placement Note    PRE-PROCEDURE VERIFICATION    Correct Procedure: yes  Time out completed with assistant Mayank Ramirez RN, [facility] and all persons present in agreement with time out. Risks and benefits reviewed with spouse  and informed consent obtained prior to assessment and procedure. Correct Site: yes  Temperature: Temp: (!) 100.6 °F (38.1 °C), Temperature Source:    Recent Labs     03/22/23  0346 03/23/23  0436   BUN 51* 54*     --    WBC 7.7  --      Allergies: Patient has no known allergies. Education materials for [facility] given to patient or family. PROCEDURE DETAIL  A double lumen PICC line was started for Limited/no vessel suitable for conventional peripheral access. The following documentation is in addition to the PICC properties in the lines/airways flowsheet:    Lot #: [id number]  Xylocaine used: Yes  Mid-Arm Circumference: 32 (cm)  Internal Catheter Length: 41 (cm)  External Catheter Length: 0 (cm)  Total Catheter Length: 41 (cm)  Vein Selection for PICC: right basilic  Central Line Insertion Bundle followed: Yes  Complication Related to Insertion: none    Both the insertion guidewire and ECG guidewire were removed intact all ports have positive blood return and were flush well with normal saline. The location of the tip of the PICC is verified using ECG technology. The tip is in the SVC per ECG reading. See image below.                Line is okay to use: yes      Evangelista Tubbs RN, [facility]

## 2023-03-24 ENCOUNTER — APPOINTMENT (OUTPATIENT)
Dept: GENERAL RADIOLOGY | Age: 68
DRG: 215 | End: 2023-03-24
Payer: COMMERCIAL

## 2023-03-24 LAB
ABO + RH BLD: NORMAL
ANION GAP SERPL CALC-SCNC: 3 MMOL/L (ref 2–11)
BASOPHILS # BLD: 0 K/UL (ref 0–0.2)
BASOPHILS NFR BLD: 0 % (ref 0–2)
BLD PROD TYP BPU: NORMAL
BLOOD BANK DISPENSE STATUS: NORMAL
BLOOD GROUP ANTIBODIES SERPL: NORMAL
BPU ID: NORMAL
BUN SERPL-MCNC: 39 MG/DL (ref 8–23)
CALCIUM SERPL-MCNC: 8.5 MG/DL (ref 8.3–10.4)
CHLORIDE SERPL-SCNC: 114 MMOL/L (ref 101–110)
CO2 SERPL-SCNC: 26 MMOL/L (ref 21–32)
CREAT SERPL-MCNC: 1.4 MG/DL (ref 0.8–1.5)
CROSSMATCH RESULT: NORMAL
DIFFERENTIAL METHOD BLD: ABNORMAL
EOSINOPHIL # BLD: 0.1 K/UL (ref 0–0.8)
EOSINOPHIL NFR BLD: 1 % (ref 0.5–7.8)
ERYTHROCYTE [DISTWIDTH] IN BLOOD BY AUTOMATED COUNT: 15.5 % (ref 11.9–14.6)
GLUCOSE BLD STRIP.AUTO-MCNC: 257 MG/DL (ref 65–100)
GLUCOSE BLD STRIP.AUTO-MCNC: 280 MG/DL (ref 65–100)
GLUCOSE BLD STRIP.AUTO-MCNC: 297 MG/DL (ref 65–100)
GLUCOSE BLD STRIP.AUTO-MCNC: 322 MG/DL (ref 65–100)
GLUCOSE BLD STRIP.AUTO-MCNC: 322 MG/DL (ref 65–100)
GLUCOSE SERPL-MCNC: 283 MG/DL (ref 65–100)
HCT VFR BLD AUTO: 27.8 % (ref 41.1–50.3)
HGB BLD-MCNC: 8.8 G/DL (ref 13.6–17.2)
IMM GRANULOCYTES # BLD AUTO: 0.2 K/UL (ref 0–0.5)
IMM GRANULOCYTES NFR BLD AUTO: 2 % (ref 0–5)
LYMPHOCYTES # BLD: 0.7 K/UL (ref 0.5–4.6)
LYMPHOCYTES NFR BLD: 8 % (ref 13–44)
MAGNESIUM SERPL-MCNC: 2.7 MG/DL (ref 1.8–2.4)
MCH RBC QN AUTO: 29.6 PG (ref 26.1–32.9)
MCHC RBC AUTO-ENTMCNC: 31.7 G/DL (ref 31.4–35)
MCV RBC AUTO: 93.6 FL (ref 82–102)
MONOCYTES # BLD: 0.7 K/UL (ref 0.1–1.3)
MONOCYTES NFR BLD: 8 % (ref 4–12)
NEUTS SEG # BLD: 7.6 K/UL (ref 1.7–8.2)
NEUTS SEG NFR BLD: 81 % (ref 43–78)
NRBC # BLD: 0.02 K/UL (ref 0–0.2)
PLATELET # BLD AUTO: 333 K/UL (ref 150–450)
PMV BLD AUTO: 10.4 FL (ref 9.4–12.3)
POTASSIUM SERPL-SCNC: 3.8 MMOL/L (ref 3.5–5.1)
RBC # BLD AUTO: 2.97 M/UL (ref 4.23–5.6)
SERVICE CMNT-IMP: ABNORMAL
SODIUM SERPL-SCNC: 143 MMOL/L (ref 133–143)
SPECIMEN EXP DATE BLD: NORMAL
UNIT DIVISION: 0
WBC # BLD AUTO: 9.4 K/UL (ref 4.3–11.1)

## 2023-03-24 PROCEDURE — 97535 SELF CARE MNGMENT TRAINING: CPT

## 2023-03-24 PROCEDURE — 2580000003 HC RX 258: Performed by: INTERNAL MEDICINE

## 2023-03-24 PROCEDURE — 97110 THERAPEUTIC EXERCISES: CPT

## 2023-03-24 PROCEDURE — 2580000003 HC RX 258: Performed by: NURSE PRACTITIONER

## 2023-03-24 PROCEDURE — 85025 COMPLETE CBC W/AUTO DIFF WBC: CPT

## 2023-03-24 PROCEDURE — 6370000000 HC RX 637 (ALT 250 FOR IP): Performed by: PHYSICIAN ASSISTANT

## 2023-03-24 PROCEDURE — 99232 SBSQ HOSP IP/OBS MODERATE 35: CPT | Performed by: INTERNAL MEDICINE

## 2023-03-24 PROCEDURE — 94760 N-INVAS EAR/PLS OXIMETRY 1: CPT

## 2023-03-24 PROCEDURE — 6370000000 HC RX 637 (ALT 250 FOR IP): Performed by: INTERNAL MEDICINE

## 2023-03-24 PROCEDURE — A4216 STERILE WATER/SALINE, 10 ML: HCPCS | Performed by: INTERNAL MEDICINE

## 2023-03-24 PROCEDURE — 2580000003 HC RX 258: Performed by: PHYSICIAN ASSISTANT

## 2023-03-24 PROCEDURE — 97530 THERAPEUTIC ACTIVITIES: CPT

## 2023-03-24 PROCEDURE — 2580000003 HC RX 258: Performed by: THORACIC SURGERY (CARDIOTHORACIC VASCULAR SURGERY)

## 2023-03-24 PROCEDURE — 80048 BASIC METABOLIC PNL TOTAL CA: CPT

## 2023-03-24 PROCEDURE — 6370000000 HC RX 637 (ALT 250 FOR IP): Performed by: THORACIC SURGERY (CARDIOTHORACIC VASCULAR SURGERY)

## 2023-03-24 PROCEDURE — 2700000000 HC OXYGEN THERAPY PER DAY

## 2023-03-24 PROCEDURE — 36592 COLLECT BLOOD FROM PICC: CPT

## 2023-03-24 PROCEDURE — 2140000001 HC CVICU INTERMEDIATE R&B

## 2023-03-24 PROCEDURE — 97112 NEUROMUSCULAR REEDUCATION: CPT

## 2023-03-24 PROCEDURE — 6360000002 HC RX W HCPCS: Performed by: INTERNAL MEDICINE

## 2023-03-24 PROCEDURE — 71045 X-RAY EXAM CHEST 1 VIEW: CPT

## 2023-03-24 PROCEDURE — 83735 ASSAY OF MAGNESIUM: CPT

## 2023-03-24 PROCEDURE — C9113 INJ PANTOPRAZOLE SODIUM, VIA: HCPCS | Performed by: INTERNAL MEDICINE

## 2023-03-24 PROCEDURE — 82962 GLUCOSE BLOOD TEST: CPT

## 2023-03-24 PROCEDURE — 97116 GAIT TRAINING THERAPY: CPT

## 2023-03-24 PROCEDURE — 97165 OT EVAL LOW COMPLEX 30 MIN: CPT

## 2023-03-24 RX ORDER — ACETAMINOPHEN 325 MG/1
650 TABLET ORAL EVERY 4 HOURS PRN
Status: DISCONTINUED | OUTPATIENT
Start: 2023-03-24 | End: 2023-03-24 | Stop reason: SDUPTHER

## 2023-03-24 RX ORDER — FUROSEMIDE 10 MG/ML
20 INJECTION INTRAMUSCULAR; INTRAVENOUS ONCE
Status: COMPLETED | OUTPATIENT
Start: 2023-03-24 | End: 2023-03-24

## 2023-03-24 RX ORDER — TRAMADOL HYDROCHLORIDE 50 MG/1
100 TABLET ORAL EVERY 6 HOURS PRN
Status: DISCONTINUED | OUTPATIENT
Start: 2023-03-24 | End: 2023-03-27 | Stop reason: HOSPADM

## 2023-03-24 RX ORDER — INSULIN GLARGINE 100 [IU]/ML
26 INJECTION, SOLUTION SUBCUTANEOUS 2 TIMES DAILY
Status: DISCONTINUED | OUTPATIENT
Start: 2023-03-24 | End: 2023-03-27 | Stop reason: HOSPADM

## 2023-03-24 RX ORDER — FUROSEMIDE 40 MG/1
40 TABLET ORAL DAILY
Status: DISCONTINUED | OUTPATIENT
Start: 2023-03-25 | End: 2023-03-25

## 2023-03-24 RX ORDER — OXYCODONE HYDROCHLORIDE AND ACETAMINOPHEN 5; 325 MG/1; MG/1
1 TABLET ORAL EVERY 4 HOURS PRN
Status: DISCONTINUED | OUTPATIENT
Start: 2023-03-24 | End: 2023-03-24

## 2023-03-24 RX ORDER — INSULIN LISPRO 100 [IU]/ML
0-12 INJECTION, SOLUTION INTRAVENOUS; SUBCUTANEOUS
Status: DISCONTINUED | OUTPATIENT
Start: 2023-03-24 | End: 2023-03-24 | Stop reason: SDUPTHER

## 2023-03-24 RX ORDER — INSULIN LISPRO 100 [IU]/ML
0-4 INJECTION, SOLUTION INTRAVENOUS; SUBCUTANEOUS NIGHTLY
Status: DISCONTINUED | OUTPATIENT
Start: 2023-03-24 | End: 2023-03-24 | Stop reason: SDUPTHER

## 2023-03-24 RX ORDER — LANOLIN ALCOHOL/MO/W.PET/CERES
400 CREAM (GRAM) TOPICAL 4 TIMES DAILY PRN
Status: DISCONTINUED | OUTPATIENT
Start: 2023-03-24 | End: 2023-03-27 | Stop reason: HOSPADM

## 2023-03-24 RX ORDER — SENNA AND DOCUSATE SODIUM 50; 8.6 MG/1; MG/1
1 TABLET, FILM COATED ORAL 2 TIMES DAILY
Status: DISCONTINUED | OUTPATIENT
Start: 2023-03-24 | End: 2023-03-27 | Stop reason: HOSPADM

## 2023-03-24 RX ORDER — FAMOTIDINE 20 MG/1
20 TABLET, FILM COATED ORAL 2 TIMES DAILY
Status: DISCONTINUED | OUTPATIENT
Start: 2023-03-24 | End: 2023-03-27 | Stop reason: HOSPADM

## 2023-03-24 RX ORDER — DEXTROSE MONOHYDRATE 100 MG/ML
INJECTION, SOLUTION INTRAVENOUS CONTINUOUS PRN
Status: DISCONTINUED | OUTPATIENT
Start: 2023-03-24 | End: 2023-03-27 | Stop reason: HOSPADM

## 2023-03-24 RX ORDER — SODIUM CHLORIDE 0.9 % (FLUSH) 0.9 %
5-40 SYRINGE (ML) INJECTION PRN
Status: DISCONTINUED | OUTPATIENT
Start: 2023-03-24 | End: 2023-03-27 | Stop reason: HOSPADM

## 2023-03-24 RX ORDER — POTASSIUM CHLORIDE 20 MEQ/1
20 TABLET, EXTENDED RELEASE ORAL 2 TIMES DAILY WITH MEALS
Status: CANCELLED | OUTPATIENT
Start: 2023-03-24

## 2023-03-24 RX ORDER — POTASSIUM CHLORIDE 750 MG/1
10 TABLET, EXTENDED RELEASE ORAL DAILY
Status: DISCONTINUED | OUTPATIENT
Start: 2023-03-25 | End: 2023-03-27 | Stop reason: HOSPADM

## 2023-03-24 RX ORDER — POTASSIUM CHLORIDE 20 MEQ/1
20 TABLET, EXTENDED RELEASE ORAL 2 TIMES DAILY PRN
Status: DISCONTINUED | OUTPATIENT
Start: 2023-03-24 | End: 2023-03-27 | Stop reason: HOSPADM

## 2023-03-24 RX ORDER — POTASSIUM CHLORIDE 20 MEQ/1
40 TABLET, EXTENDED RELEASE ORAL 2 TIMES DAILY PRN
Status: DISCONTINUED | OUTPATIENT
Start: 2023-03-24 | End: 2023-03-24 | Stop reason: SDUPTHER

## 2023-03-24 RX ORDER — INSULIN LISPRO 100 [IU]/ML
0-16 INJECTION, SOLUTION INTRAVENOUS; SUBCUTANEOUS
Status: DISCONTINUED | OUTPATIENT
Start: 2023-03-24 | End: 2023-03-27 | Stop reason: HOSPADM

## 2023-03-24 RX ORDER — POLYETHYLENE GLYCOL 3350 17 G/17G
17 POWDER, FOR SOLUTION ORAL DAILY PRN
Status: DISCONTINUED | OUTPATIENT
Start: 2023-03-24 | End: 2023-03-27 | Stop reason: HOSPADM

## 2023-03-24 RX ORDER — ONDANSETRON 4 MG/1
4 TABLET, ORALLY DISINTEGRATING ORAL EVERY 8 HOURS PRN
Status: DISCONTINUED | OUTPATIENT
Start: 2023-03-24 | End: 2023-03-27 | Stop reason: HOSPADM

## 2023-03-24 RX ORDER — INSULIN LISPRO 100 [IU]/ML
0-6 INJECTION, SOLUTION INTRAVENOUS; SUBCUTANEOUS NIGHTLY
Status: DISCONTINUED | OUTPATIENT
Start: 2023-03-24 | End: 2023-03-24 | Stop reason: SDUPTHER

## 2023-03-24 RX ORDER — ONDANSETRON 2 MG/ML
4 INJECTION INTRAMUSCULAR; INTRAVENOUS EVERY 6 HOURS PRN
Status: DISCONTINUED | OUTPATIENT
Start: 2023-03-24 | End: 2023-03-27 | Stop reason: HOSPADM

## 2023-03-24 RX ORDER — SODIUM CHLORIDE 0.9 % (FLUSH) 0.9 %
5-40 SYRINGE (ML) INJECTION EVERY 12 HOURS SCHEDULED
Status: DISCONTINUED | OUTPATIENT
Start: 2023-03-24 | End: 2023-03-27 | Stop reason: HOSPADM

## 2023-03-24 RX ORDER — POTASSIUM CHLORIDE 20 MEQ/1
20 TABLET, EXTENDED RELEASE ORAL 2 TIMES DAILY PRN
Status: DISCONTINUED | OUTPATIENT
Start: 2023-03-24 | End: 2023-03-24 | Stop reason: SDUPTHER

## 2023-03-24 RX ORDER — LANOLIN ALCOHOL/MO/W.PET/CERES
400 CREAM (GRAM) TOPICAL 3 TIMES DAILY PRN
Status: DISCONTINUED | OUTPATIENT
Start: 2023-03-24 | End: 2023-03-27 | Stop reason: HOSPADM

## 2023-03-24 RX ORDER — AMLODIPINE BESYLATE 5 MG/1
5 TABLET ORAL DAILY
Status: DISCONTINUED | OUTPATIENT
Start: 2023-03-24 | End: 2023-03-27 | Stop reason: HOSPADM

## 2023-03-24 RX ADMIN — ATORVASTATIN CALCIUM 80 MG: 80 TABLET, FILM COATED ORAL at 22:26

## 2023-03-24 RX ADMIN — AMIODARONE HYDROCHLORIDE 200 MG: 200 TABLET ORAL at 22:26

## 2023-03-24 RX ADMIN — INSULIN LISPRO 8 UNITS: 100 INJECTION, SOLUTION INTRAVENOUS; SUBCUTANEOUS at 12:26

## 2023-03-24 RX ADMIN — INSULIN LISPRO 2 UNITS: 100 INJECTION, SOLUTION INTRAVENOUS; SUBCUTANEOUS at 06:24

## 2023-03-24 RX ADMIN — POTASSIUM CHLORIDE 20 MEQ: 1500 TABLET, EXTENDED RELEASE ORAL at 22:26

## 2023-03-24 RX ADMIN — METOPROLOL TARTRATE 25 MG: 25 TABLET, FILM COATED ORAL at 09:12

## 2023-03-24 RX ADMIN — INSULIN GLARGINE 26 UNITS: 100 INJECTION, SOLUTION SUBCUTANEOUS at 22:25

## 2023-03-24 RX ADMIN — AMIODARONE HYDROCHLORIDE 200 MG: 200 TABLET ORAL at 09:11

## 2023-03-24 RX ADMIN — FAMOTIDINE 20 MG: 20 TABLET, FILM COATED ORAL at 22:26

## 2023-03-24 RX ADMIN — AMLODIPINE BESYLATE 5 MG: 5 TABLET ORAL at 09:10

## 2023-03-24 RX ADMIN — METOPROLOL TARTRATE 25 MG: 25 TABLET, FILM COATED ORAL at 22:26

## 2023-03-24 RX ADMIN — Medication 1 AMPULE: at 22:25

## 2023-03-24 RX ADMIN — SENNOSIDES AND DOCUSATE SODIUM 1 TABLET: 8.6; 5 TABLET ORAL at 22:26

## 2023-03-24 RX ADMIN — INSULIN LISPRO 12 UNITS: 100 INJECTION, SOLUTION INTRAVENOUS; SUBCUTANEOUS at 22:25

## 2023-03-24 RX ADMIN — FAMOTIDINE 20 MG: 20 TABLET, FILM COATED ORAL at 09:12

## 2023-03-24 RX ADMIN — INSULIN LISPRO 2 UNITS: 100 INJECTION, SOLUTION INTRAVENOUS; SUBCUTANEOUS at 00:43

## 2023-03-24 RX ADMIN — Medication 1 AMPULE: at 09:16

## 2023-03-24 RX ADMIN — ASPIRIN 81 MG: 81 TABLET ORAL at 09:11

## 2023-03-24 RX ADMIN — PANTOPRAZOLE SODIUM 40 MG: 40 INJECTION, POWDER, LYOPHILIZED, FOR SOLUTION INTRAVENOUS at 09:12

## 2023-03-24 RX ADMIN — POTASSIUM CHLORIDE 20 MEQ: 1500 TABLET, EXTENDED RELEASE ORAL at 17:30

## 2023-03-24 RX ADMIN — INSULIN LISPRO 12 UNITS: 100 INJECTION, SOLUTION INTRAVENOUS; SUBCUTANEOUS at 17:30

## 2023-03-24 RX ADMIN — SODIUM CHLORIDE, PRESERVATIVE FREE 10 ML: 5 INJECTION INTRAVENOUS at 09:13

## 2023-03-24 RX ADMIN — SODIUM CHLORIDE, PRESERVATIVE FREE 10 ML: 5 INJECTION INTRAVENOUS at 09:15

## 2023-03-24 RX ADMIN — SODIUM CHLORIDE, PRESERVATIVE FREE 10 ML: 5 INJECTION INTRAVENOUS at 09:14

## 2023-03-24 RX ADMIN — FUROSEMIDE 20 MG: 10 INJECTION, SOLUTION INTRAMUSCULAR; INTRAVENOUS at 08:42

## 2023-03-24 RX ADMIN — LEVOTHYROXINE SODIUM 175 MCG: 50 TABLET ORAL at 06:24

## 2023-03-24 RX ADMIN — INSULIN GLARGINE 26 UNITS: 100 INJECTION, SOLUTION SUBCUTANEOUS at 09:12

## 2023-03-24 RX ADMIN — SODIUM CHLORIDE, PRESERVATIVE FREE 10 ML: 5 INJECTION INTRAVENOUS at 22:34

## 2023-03-24 RX ADMIN — SODIUM CHLORIDE, PRESERVATIVE FREE 10 ML: 5 INJECTION INTRAVENOUS at 22:33

## 2023-03-24 RX ADMIN — ACETAMINOPHEN 650 MG: 325 TABLET, FILM COATED ORAL at 18:31

## 2023-03-24 RX ADMIN — OXYCODONE AND ACETAMINOPHEN 1 TABLET: 5; 325 TABLET ORAL at 09:11

## 2023-03-24 ASSESSMENT — PAIN DESCRIPTION - LOCATION
LOCATION: CHEST
LOCATION: INCISION
LOCATION: INCISION

## 2023-03-24 ASSESSMENT — PAIN SCALES - GENERAL
PAINLEVEL_OUTOF10: 0
PAINLEVEL_OUTOF10: 4
PAINLEVEL_OUTOF10: 6
PAINLEVEL_OUTOF10: 3
PAINLEVEL_OUTOF10: 0
PAINLEVEL_OUTOF10: 4
PAINLEVEL_OUTOF10: 4
PAINLEVEL_OUTOF10: 0

## 2023-03-24 ASSESSMENT — PAIN - FUNCTIONAL ASSESSMENT
PAIN_FUNCTIONAL_ASSESSMENT: PREVENTS OR INTERFERES SOME ACTIVE ACTIVITIES AND ADLS
PAIN_FUNCTIONAL_ASSESSMENT: ACTIVITIES ARE NOT PREVENTED

## 2023-03-24 ASSESSMENT — PAIN DESCRIPTION - ORIENTATION
ORIENTATION: MID

## 2023-03-24 ASSESSMENT — PAIN DESCRIPTION - DESCRIPTORS
DESCRIPTORS: ACHING
DESCRIPTORS: ACHING

## 2023-03-24 NOTE — PROGRESS NOTES
NL63 by PCR NOT DETECTED        Coronavirus OC43 by PCR NOT DETECTED        SARS-CoV-2, PCR NOT DETECTED        Human Metapneumovirus by PCR NOT DETECTED        Rhinovirus Enterovirus PCR NOT DETECTED        Influenza A by PCR NOT DETECTED        Influenza B PCR NOT DETECTED        Parainfluenza 1 PCR NOT DETECTED        Parainfluenza 2 PCR NOT DETECTED        Parainfluenza 3 PCR NOT DETECTED        Parainfluenza 4 PCR NOT DETECTED        Respiratory Syncytial Virus by PCR NOT DETECTED        Bordetella parapertussis by PCR NOT DETECTED        Bordetella pertussis by PCR NOT DETECTED        Chlamydophila Pneumonia PCR NOT DETECTED        Mycoplasma pneumo by PCR NOT DETECTED       Influenza A/B, Molecular [[id number]]     Order Status: Canceled Specimen: Nasal     Influenza A/B, Molecular [[id number]] Collected: 03/15/23 1057    Order Status: Completed Specimen: Not Specified Updated: 03/15/23 1121     Influenza A, JONY Not detected        Comment: Negative results do not preclude infection with influenza virus and should not be the sole basis of a patient treatment decision. Influenza B, JONY Not detected       COVID-19, Rapid [[id number]] Collected: 03/15/23 1057    Order Status: Completed Specimen: Nasopharyngeal Updated: 03/15/23 1121     Source Nasopharyngeal        SARS-CoV-2, Rapid Not detected        Comment:      The specimen is NEGATIVE for SARS-CoV-2, the novel coronavirus associated with COVID-19. A negative result does not rule out COVID-19. This test has been authorized by the FDA under an Emergency Use Authorization (EUA) for use by authorized laboratories.         Fact sheet for Healthcare Providers: ConventionUpdate.co.nz  Fact sheet for Patients: ConventionUpdate.co.nz       Methodology: Isothermal Nucleic Acid Amplification               Ventilator Settings Ideal body weight: 77.6 kg (171 lb 1.2 oz)  Adjusted ideal body weight: 91.2 kg (201 lb 2.4 oz)  Mode FIO2 Rate Tidal Volume Pressure   CPAP/PS    40 %  10 bmp     0.5 mL   8     Peak airway pressure:     Minute ventilation:    ABG:  Recent Labs     03/22/23  0421 03/23/23  0325   PHAPOC 7.45 7.46*   [id number] 30.2* 32.4*   [id number] 68* 97   [id number] 21.1* 23.2     Assessment and Plan:  (Medical Decision Making)   Impression: 74 y.o. male with CAD, s/p emergent CABG in setting of CAD and cardiac arrest. . NEURO:   Sedation: off versed. Precedex. Analgesia: off fentanyl  CV:   Volume Status: appears euvolemic. CAD: s/p cabg 3/17. PULM:   Acute hypoxemic/hypercapneic respiratory failure: extubated. On airvo     Pleural effusion: R> L on CXR today. Will need to eval with US when he can sit up. Cxr looks worse today     RENAL:  ANNMARIE:  no lab today yet   Electrolytes: replace as needed  GI:   Nutrition:  did not pass swallow yesterday but ok now  HEME:   Anemia: mild. Transfuse for < 7.    Anticoagulation: none  ID:   No issues:   ENDO:   DM: glargine, ssi- bl sugars high- increase ss  Skin: no decub, turns, preventive care  Prophy: scd's, ppi    Sunitha Hernandez MD

## 2023-03-24 NOTE — PROGRESS NOTES
[facility] PROGRESS NOTE    Follow up for: ANNMARIE    Subjective:     Extubated     ROS:  Not in distress   UTO    Objective:   Exam:  Vitals:    03/24/23 0400 03/24/23 0500 03/24/23 0600 03/24/23 0630   BP: (!) 169/78 (!) 162/75 (!) 162/73 (!) 164/74   Pulse: 91 92 93 96   Resp: (!) 34 26 30 (!) 33   Temp: 98.4 °F (36.9 °C)      TempSrc: Oral      SpO2: 92% 92% 93% 94%   Weight:       Height:             Intake/Output Summary (Last 24 hours) at 3/24/2023 0801  Last data filed at 3/24/2023 0600  Gross per 24 hour   Intake 162.73 ml   Output 2880 ml   Net -2717.27 ml         Current Facility-Administered Medications   Medication Dose Route Frequency    insulin lispro (HUMALOG) injection vial 0-16 Units  0-16 Units SubCUTAneous 4x Daily AC & HS    insulin lispro (HUMALOG) injection vial 0-4 Units  0-4 Units SubCUTAneous Nightly    amLODIPine (NORVASC) tablet 5 mg  5 mg Oral Daily    lidocaine PF 1 % injection 5 mL  5 mL IntraDERmal Once    sodium chloride flush 0.9 % injection 5-40 mL  5-40 mL IntraVENous 2 times per day    sodium chloride flush 0.9 % injection 5-40 mL  5-40 mL IntraVENous PRN    0.9 % sodium chloride infusion  25 mL IntraVENous PRN    sodium chloride flush 0.9 % injection 5-40 mL  5-40 mL IntraVENous 2 times per day    sodium chloride flush 0.9 % injection 5-40 mL  5-40 mL IntraVENous PRN    0.9 % sodium chloride infusion  25 mL IntraVENous PRN    insulin glargine (LANTUS) injection vial 22 Units  22 Units SubCUTAneous BID    sodium phosphate 10 mmol in sodium chloride 0.9 % 250 mL IVPB  10 mmol IntraVENous PRN    Or    sodium phosphate 15 mmol in sodium chloride 0.9 % 250 mL IVPB  15 mmol IntraVENous PRN    Or    sodium phosphate 20 mmol in sodium chloride 0.9 % 500 mL IVPB  20 mmol IntraVENous PRN    0.9 % sodium chloride infusion   IntraVENous PRN    dexmedetomidine (PRECEDEX) 400 mcg in sodium chloride 0.9 % 100 mL infusion  0.1-1.5 mcg/kg/hr IntraVENous Continuous    0.9 % sodium chloride PRN    EPINEPHrine (EPINEPHrine HCL) 1 mg/mL 5 mg in sodium chloride 0.9 % 250 mL infusion  0.1-0.3 mcg/kg/min IntraVENous Continuous PRN    nitroGLYCERIN 200 mcg/ml in dextrose 5%  0-100 mcg/min IntraVENous Continuous PRN    norepinephrine (LEVOPHED) 16 mg in sodium chloride 0.9 % 250 mL infusion  0.01-3.3 mcg/kg/min IntraVENous Continuous PRN    fentaNYL (SUBLIMAZE) 1,000 mcg in sodium chloride 0.9% 100 mL infusion   mcg/hr IntraVENous Continuous    midazolam (VERSED) 1 mg/mL in NS infusion  1-10 mg/hr IntraVENous Continuous    [Held by provider] DOPamine (INTROPIN) 800 mg in dextrose 5 % 250 mL infusion  1-20 mcg/kg/min IntraVENous Continuous    levothyroxine (SYNTHROID) tablet 175 mcg  175 mcg Oral QAM AC    propofol injection  5-50 mcg/kg/min IntraVENous Continuous    fentaNYL (SUBLIMAZE) injection 50 mcg  50 mcg IntraVENous Q1H PRN    midazolam (VERSED) injection 2 mg  2 mg IntraVENous Q1H PRN    pantoprazole (PROTONIX) 40 mg in sodium chloride (PF) 0.9 % 10 mL injection  40 mg IntraVENous Daily    glucose chewable tablet 16 g  4 tablet Oral PRN    dextrose bolus 10% 125 mL  125 mL IntraVENous PRN    Or    dextrose bolus 10% 250 mL  250 mL IntraVENous PRN    glucagon (rDNA) injection 1 mg  1 mg SubCUTAneous PRN    dextrose 10 % infusion   IntraVENous Continuous PRN    [Held by provider] fenofibrate (TRIGLIDE) tablet 160 mg  160 mg Oral Daily    sodium chloride flush 0.9 % injection 5-40 mL  5-40 mL IntraVENous 2 times per day    sodium chloride flush 0.9 % injection 5-40 mL  5-40 mL IntraVENous PRN    0.9 % sodium chloride infusion   IntraVENous PRN    ondansetron (ZOFRAN-ODT) disintegrating tablet 4 mg  4 mg Oral Q8H PRN    Or    ondansetron (ZOFRAN) injection 4 mg  4 mg IntraVENous Q6H PRN    polyethylene glycol (GLYCOLAX) packet 17 g  17 g Oral Daily PRN    nitroGLYCERIN (NITROSTAT) SL tablet 0.4 mg  0.4 mg SubLINGual Q5 Min PRN    potassium chloride (KLOR-CON M) extended release tablet 40 mEq  40 mEq

## 2023-03-24 NOTE — PROGRESS NOTES
A follow up visit was made to the patient. Emotional support, spiritual presence and   prayer were provided for the patient and his wife, Torri Smith. The patient voluntarily recited the Lord's prayer for the , his wife and his nurse. He was in the process of being transferred to the [facility] unit.       Yady Mckeon, [address] [facility], [facility]

## 2023-03-24 NOTE — PROGRESS NOTES
Independent  Homemaking Assistance: Independent  Ambulation Assistance: Independent  Transfer Assistance: Independent  Active : Yes  Mode of Transportation: Car  Occupation: Full time employment  OBJECTIVE:     PAIN: VITALS / O2: PRECAUTION / James Im / DRAINS:   Pre Treatment:   Pain Assessment: None - Denies Pain      Post Treatment: 0 Vitals        Oxygen    Continuous Pulse Oximetry and Telemetry     RESTRICTIONS/PRECAUTIONS:        MOBILITY: I Mod I S SBA CGA Min Mod Max Total  NT x2 Comments:   Bed Mobility    Rolling [] [] [] [] [] [] [] [] [] [x] []    Supine to Sit [] [] [] [] [] [] [] [] [] [x] []    Scooting [] [] [] [] [x] [] [] [] [] [] [] In short sitting   Sit to Supine [] [] [] [] [] [] [] [] [] [x] []    Transfers    Sit to Stand [] [] [] [] [] [x] [] [] [] [] [x]    Bed to Chair [] [] [] [] [] [] [] [] [] [] []    Stand to Sit [] [] [] [] [] [x] [] [] [] [] [x]     [] [] [] [] [] [] [] [] [] [] []    I=Independent, Mod I=Modified Independent, S=Supervision, SBA=Standby Assistance, CGA=Contact Guard Assistance,   Min=Minimal Assistance, Mod=Moderate Assistance, Max=Maximal Assistance, Total=Total Assistance, NT=Not Tested    BALANCE: Good Fair+ Fair Fair- Poor NT Comments   Sitting Static [] [] [] [] [] []    Sitting Dynamic [] [] [] [] [] []              Standing Static [] [] [] [] [x] []    Standing Dynamic [] [] [] [] [x] []      GAIT: I Mod I S SBA CGA Min Mod Max Total  NT x2 Comments:   Level of Assistance [] [] [] [] [] [x] [x] [] [] [] [x] With chair follow for safety   Distance 75 feet    DME Cardiac Walker and Gait Belt    Gait Quality Decreased harshil , Decreased step clearance, and Decreased step length    Weightbearing Status      Stairs      I=Independent, Mod I=Modified Independent, S=Supervision, SBA=Standby Assistance, CGA=Contact Guard Assistance,   Min=Minimal Assistance, Mod=Moderate Assistance, Max=Maximal Assistance, Total=Total Assistance, NT=Not Tested    PLAN:

## 2023-03-24 NOTE — PROGRESS NOTES
Ashleigh x2 on 15L HFNC. Pt very impulsive with poor safety awareness. Required hands on assist of 2 therapists and a close chair follow for safety. Recommend rehab at discharge. Carol Ann Dasilva currently demonstrates overall deficits in strength, balance, activity tolerance, and ADL performance. Patient would benefit from skilled OT services at this time in order to address functional deficits and OT goals stated above. Deshawn Daily Activity Inpatient Short Form:    AM-PAC Daily Activity - Inpatient   How much help is needed for putting on and taking off regular lower body clothing?: A Lot  How much help is needed for bathing (which includes washing, rinsing, drying)?: A Lot  How much help is needed for toileting (which includes using toilet, bedpan, or urinal)?: A Lot  How much help is needed for putting on and taking off regular upper body clothing?: A Little  How much help is needed for taking care of personal grooming?: A Little  How much help for eating meals?: None  AM-PAC Inpatient Daily Activity Raw Score: 16  AM-PAC Inpatient ADL T-Scale Score : 35.96  ADL Inpatient CMS 0-100% Score: 53.32  ADL Inpatient CMS G-Code Modifier : CK    SUBJECTIVE:     Mr. Anita Staton states, \"I am not tired. \"     Social/Functional Lives With: Spouse  Type of Home: House  Home Equipment: None  ADL Assistance: Independent  Homemaking Assistance: Independent  Ambulation Assistance: Independent  Transfer Assistance: Independent  Active : Yes  Mode of Transportation: Car  Occupation: Full time employment    OBJECTIVE:     LINES / Sherrmary Gray / Anu Carson: Baeza Catheter, IV, and ICU monitors     RESTRICTIONS/PRECAUTIONS:       PAIN: VITALS / O2:   Pre Treatment:   Pain Assessment: None - Denies Pain      Post Treatment: no change       Vitals          Oxygen   15L HFNC         GROSS EVALUATION: INTACT IMPAIRED   (See Comments)   UE AROM [x] []   UE PROM [x] []   Strength []  Generalized weakness, functional for bADLs     Posture / Balance []  Fair+ sitting, fair- standing    Sensation [x]     Coordination []  Generally decreased      Tone [x]       Edema [x]    Activity Tolerance []  Fatigues with minimal activity, seated rest breaks required      Hand Dominance R [] L []      COGNITION/  PERCEPTION: INTACT IMPAIRED   (See Comments)   Orientation [x]     Vision [x]     Hearing [x]     Cognition  []  Very impulsive, poor safety awareness and insight into functional deficits    Perception []       MOBILITY: I Mod I S SBA CGA Min Mod Max Total  NT x2 Comments:   Bed Mobility    Rolling [] [] [] [] [] [] [] [] [] [x] []    Supine to Sit [] [] [] [] [] [] [] [] [] [x] [] Received in chair    Scooting [] [] [] [] [] [] [] [] [] [x] []    Sit to Supine [] [] [] [] [] [] [] [] [] [x] [] Left sitting in the chair    Transfers    Sit to Stand [] [] [] [] [] [x] [] [] [] [] [x]    Bed to Chair [] [] [] [] [] [] [] [] [] [x] []    Stand to Sit [] [] [] [] [] [x] [] [] [] [] [x]    Tub/Shower [] [] [] [] [] [] [] [] [] [x] []     Toilet [] [] [] [] [] [] [] [] [] [x] []      [] [] [] [] [] [] [] [] [] [] []    I=Independent, Mod I=Modified Independent, S=Supervision/Setup, SBA=Standby Assistance, CGA=Contact Guard Assistance, Min=Minimal Assistance, Mod=Moderate Assistance, Max=Maximal Assistance, Total=Total Assistance, NT=Not Tested    ACTIVITIES OF DAILY LIVING: I Mod I S SBA CGA Min Mod Max Total NT Comments   BASIC ADLs:              Upper Body Bathing  [] [] [] [] [] [] [] [] [] [x]    Lower Body Bathing [] [] [] [] [] [] [] [] [] [x]    Toileting [] [] [] [] [] [] [] [] [] [x]    Upper Body Dressing [] [] [] [] [] [] [] [] [] [x]    Lower Body Dressing [] [] [] [] [] [] [] [x] [] [] Socks    Feeding [] [] [] [] [] [] [] [] [] [x]    Grooming [] [] [] [] [x] [] [] [] [] [] Washed hair and combed hair sitting in the chair    Personal Device Care [] [] [] [] [] [] [] [] [] [x]    Functional Mobility [] [] [] [] [] [x] [] [] []

## 2023-03-24 NOTE — PROGRESS NOTES
3/24/2023 11:02 AM    Admit Date: 3/15/2023    Admit Diagnosis: STEMI (ST elevation myocardial infarction) ([facility] [I21.3]  Acute myocardial infarction, subendocardial infarction, initial episode of care [facility]) [I21.4]      Subjective:   No chest pain still some shortness of breath. Objective:    BP (!) 153/79   Pulse 84   Temp 98.1 °F (36.7 °C) (Oral)   Resp 28   Ht 6' (1.829 m)   Wt 246 lb 4.1 oz (111.7 kg)   SpO2 95%   BMI 33.40 kg/m²     Physical Exam:  Akin Bin, Well Nourished, No Acute Distress, Alert & Oriented x 3, appropriate mood. Neck- supple, no JVD  CV- regular rate and rhythm no MRG  Lung- clear bilaterally  Abd- soft, nontender, nondistended  Ext- no edema bilaterally. Skin- warm and dry        Data Review:   Recent Labs     03/24/23  0552      K 3.8   BUN 39*   WBC 9.4   HGB 8.8*   HCT 27.8*          Assessment/Plan:     Active Hospital Problems    ANNMARIE (acute kidney injury) (HCC)Improved with current therapy. Will continue medications        Acute pulmonary edema (HCC)      NSTEMI (non-ST elevated myocardial infarction) (HCC)/ CAD/ CABG-doing well. Advance post CABG care      S/P CABG x 4      Pleural effusion      Acute myocardial infarction, subendocardial infarction, initial episode of care [facility])      History of coronary artery stent placement      Cardiac arrest ([facility]      Acute respiratory failure with hypoxia (HCC)      Ventricular arrhythmia      Essential hypertension worse.   Add Norvasc 5 mg a day      Hyperlipidemia      Diabetes mellitus type 1.5 ([facility]

## 2023-03-24 NOTE — DIABETES MGMT
Patient s/p CABG. Blood glucose ranged 280-372 yesterday with patient receiving Lantus 43 units and Humalog 11 units. Blood glucose this morning was 257. Most recent FSBS 297. Creatinine 1.40. GFR 56. Reviewed patient current regimen: Lantus 26 units BID and Humalog correctional insulin high dose ACHS. Patient has diet ordered with 3 carb choices. Patient would likely benefit from initiation of prandial insulin to help offset hyperglycemia during the day related to caloric intake.     Per chart review patient prior to admission regimen:  Novolog TID 1 unit insulin per 9 gms carb, plus 3 units per 50 > 150, Metformin 1000 mg HS, Tresiba 92 units HS, and Ozempic 1 mg weekly at home for management of diabetes

## 2023-03-24 NOTE — PROGRESS NOTES
Today's Date: 3/24/2023  Date of Admission: 3/15/2023    Chart Reviewed. Subjective:     Pt feels better today. He is eager to walk. Medications Reviewed. Objective:     Vitals:    03/24/23 0600 03/24/23 0630 03/24/23 0833 03/24/23 0842   BP: (!) 162/73 (!) 164/74  (!) 169/78   Pulse: 93 96 92    Resp: 30 (!) 33 21    Temp:       TempSrc:       SpO2: 93% 94% 96%    Weight:       Height:           Intake and Output  Current Shift: No intake/output data recorded. Last 3 Shifts: 03/22 1901 - 03/24 0700  In: 1056.2 [P.O.:50; I.V.:793.2]  Out: 3580 [Urine:3580]    Physical Exam:  General: Well Developed, Obese, No Acute Distress, Alert & Oriented x 3, Appropriate mood  Neck: supple, no JVD  Heart: S1S2 with RRR without murmurs or gallops  Lungs: Clear throughout auscultation bilaterally   Abd: soft, nontender, nondistended, with good bowel sounds  Ext: + edema bilaterally  Skin: warm and dry    LABS  Data Review:   Recent Labs     03/22/23  0346 03/23/23  0436 03/24/23  0552   * 145* 143   K 4.6 4.8 3.8   MG 2.7* 3.1* 2.7*   BUN 51* 54* 39*   WBC 7.7  --  9.4   HGB 8.9*  --  8.8*   HCT 28.7*  --  27.8*     --  333       Estimated Creatinine Clearance: 68 mL/min (based on SCr of 1.4 mg/dL).       Assessment/Plan:      *S/P CABG x 4  On ASA, BB, statin, BP elevated at times, on Airvo, will try to ambulate    Active Hospital Problems    ANNMARIE (acute kidney injury) ([facility] [address]  Renal function stable       Acute pulmonary edema (HCC)      NSTEMI (non-ST elevated myocardial infarction) ([facility] [address]  S/p CABG     Pleural effusion  Worse today, pulmonary following       Acute myocardial infarction, subendocardial infarction, initial episode of care [facility])      History of coronary artery stent placement      Cardiac arrest ([address]  Pre-op, pt alert and oriented       Acute respiratory failure with hypoxia ([address]  On Airvo      Ventricular arrhythmia      Essential hypertension  BP elevated at times, will titrate meds as needed      Hyperlipidemia  Continue statin       Diabetes mellitus type 1.5 (HCC)  Titrating insulin         Le Patrick PA-C

## 2023-03-24 NOTE — PROGRESS NOTES
appetite, receptive to ONS. Provided with Ensure MAX that was in floor fridge.   Current Nutrition Therapies:  ADULT DIET; Regular; 3 carb choices (45 gm/meal); Low Fat/Low Chol/High Fiber/2 gm Na    Current Intake:   Average Meal Intake: 1-25% (stated for 2 meals today) Average Supplements Intake: NPO      Anthropometric Measures:  Height: 6' (182.9 cm)  Current Body Wt: 246 lb 4.1 oz (111.7 kg) (3/22), Weight source: Bed Scale  3/20/2023 250 lbs 113.4 kg -   3/20/2023 246 lbs 15 oz 112 kg Bed scale   3/20/2023 250 lbs 113.4 kg -   3/19/2023 250 lbs 113.4 kg -   3/19/2023 250 lbs 113.4 kg -   3/19/2023 250 lbs 113.4 kg -   3/19/2023 250 lbs 11 oz 113.7 kg Bed scale   3/19/2023 239 lbs 108.4 kg -   3/19/2023 239 lbs 108.4 kg -   3/18/2023 239 lbs 108.4 kg -   3/18/2023 239 lbs 108.4 kg -   3/18/2023 239 lbs 108.4 kg -   3/18/2023 239 lbs 108.4 kg -   3/17/2023 239 lbs 108.4 kg -   3/17/2023 239 lbs 10 oz 108.7 kg -   3/16/2023 231 lbs 2 oz 104.8 kg Standing scale   3/15/2023 228 lbs 103.4 kg Stated   Weight gain post-op likely d/t fluid or change in scale  BMI: 33.4, Obese Class 1 (BMI 30.0-34.9)  Admission Body Weight: 228 lb (103.4 kg)  Ideal Body Weight (Kg) (Calculated): 81 kg (178 lbs), 128.1 %       Edema: Generalized  RLE Edema: None, LLE Edema: None         BMI Category Obese Class 1 (BMI 30.0-34.9)    Estimated Daily Nutrient Needs:  Energy (kcal/day): 9487-1429 (15-20 kcal/kg) (Kcal/kg Weight used: 103.4 kg Current  Protein (g/day):  (20% of kcal) Weight Used: (Other (Comment) (25% of kcal)) 103.4 kg  Fluid (ml/day):   (1 ml/kcal)    Nutrition Diagnosis:   Inadequate oral intake related to  (altered appetite) as evidenced by  (pt reported barrier, current intake <25% of estimated needs)    Nutrition Interventions:   Food and/or Nutrient Delivery: Continue Current Diet, Start Oral Nutrition Supplement  Nutrition Education/Counseling: Education completed (3/15)  Coordination of Nutrition Care: Continue  to monitor while inpatient      Goals:   Previous Goal Met:  (goal N/A)  Active Goal: PO intake 50% or greater, by next RD assessment       Nutrition Monitoring and Evaluation:     Food/Nutrient Intake Outcomes: Food and Nutrient Intake, Supplement Intake  Physical Signs/Symptoms Outcomes: Fluid Status or Edema, Weight    Discharge Planning:     Too soon to determine    Ben [address], [address]

## 2023-03-24 NOTE — CARE COORDINATION
CM continues to follow for discharge planning and/or CM needs. Discharge plan pending clinical progress. Recommendation  from  therapy is post acute rehab but pt would prefer to return home  with home health. CM to continue to follow  and  update as needed.

## 2023-03-24 NOTE — PROGRESS NOTES
ACUTE PHYSICAL THERAPY GOALS:   (Developed with and agreed upon by patient and/or caregiver.)   Mr. Kristy Hatfield will perform supine to sit and sit to supine with bed flat and no rails independently in 7 days. Mr. Kristy Hatfield will perform sit to stand and bed to chair independently in 7 days. Mr. Kristy Hatfield will perform gait 200 ft with least restrictive device independently in 7 days. Mr. Kristy Hatfield will perform therex x 25 reps in sitting in 7 days. PHYSICAL THERAPY: Daily Note PM   (Link to Caseload Tracking: PT Visit Days : 2  Time In/Out PT Charge Capture  Rehab Caseload Tracker  Orders    Yanira Virk is a 74 y.o. male   PRIMARY DIAGNOSIS: S/P CABG x 4  STEMI (ST elevation myocardial infarction) ([facility] [address] [I21.3]  Acute myocardial infarction, subendocardial infarction, initial episode of care ([facility] [address] [I21.4]  Procedure(s) (LRB):  EXPLANT OF IMPELLA (N/A)  3 Days Post-Op  Inpatient: Payor: Anisha Saliva / Plan: [employer] [place] / Product Type: *No Product type* /     ASSESSMENT:     REHAB RECOMMENDATIONS:   Recommendation to date pending progress:  Setting:  Short-term Rehab  Pending length of stay and progress    Equipment:    To Be Determined     ASSESSMENT:  Mr. Kristy Hatfield was sitting in recliner in CVICU and leaning forward as if he was attempting to scoot forward. He appears groggy. RT placed patient on 15L nasal cannula for ambulation. Patient stood with min Ax2 and ambulated 76' with cardiac RW and min to mod Ax2, RN followed with chair due to patient impulsive/unsafe. He required more assistance turning corners and max cueing for posture and positioning in cardiac RW. SpO2 97% after ambulation, HR 97. Returned to chair and performed self care and exercises. Patient progressing well, increased gait today and required less assistance with transfers. PM Note:  Patient now on CVSD. He increased ambulation to 75'x2 with RW and min to mod Ax3. Patient very unsteady so needed chair follow for safety.   He is kind and jokes around, but still seems lethargic. Progressing well towards goals.      SUBJECTIVE:   Mr. April Jamil states, \"OK\"     Social/Functional Lives With: Spouse  Type of Home: House  Home Equipment: None  ADL Assistance: Independent  Homemaking Assistance: Independent  Ambulation Assistance: Independent  Transfer Assistance: Independent  Active : Yes  Mode of Transportation: Car  Occupation: Full time employment  OBJECTIVE:     PAIN: VITALS / O2: PRECAUTION / Dara Whatley / Brett Gandhiemaker:   Pre Treatment:   Pain Assessment: None - Denies Pain      Post Treatment: 0 Vitals        Oxygen    Continuous Pulse Oximetry and Telemetry     RESTRICTIONS/PRECAUTIONS:        MOBILITY: I Mod I S SBA CGA Min Mod Max Total  NT x2 Comments:   Bed Mobility    Rolling [] [] [] [] [] [] [] [] [] [x] []    Supine to Sit [] [] [] [] [] [] [] [] [] [x] []    Scooting [] [] [] [] [x] [] [] [] [] [] [] In short sitting   Sit to Supine [] [] [] [] [] [] [] [] [] [x] []    Transfers    Sit to Stand [] [] [] [] [] [x] [] [] [] [] [x]    Bed to Chair [] [] [] [] [] [] [] [] [] [] []    Stand to Sit [] [] [] [] [] [x] [] [] [] [] [x]     [] [] [] [] [] [] [] [] [] [] []    I=Independent, Mod I=Modified Independent, S=Supervision, SBA=Standby Assistance, CGA=Contact Guard Assistance,   Min=Minimal Assistance, Mod=Moderate Assistance, Max=Maximal Assistance, Total=Total Assistance, NT=Not Tested    BALANCE: Good Fair+ Fair Fair- Poor NT Comments   Sitting Static [] [] [] [] [] []    Sitting Dynamic [] [] [] [] [] []              Standing Static [] [] [] [] [x] []    Standing Dynamic [] [] [] [] [x] []      GAIT: I Mod I S SBA CGA Min Mod Max Total  NT x2 Comments:   Level of Assistance [] [] [] [] [] [x] [x] [] [] [] [x] With chair follow for safety   Distance 75 feet x2    DME Cardiac Walker and Gait Belt    Gait Quality Decreased harshil , Decreased step clearance, and Decreased step length    Weightbearing Status      Stairs      I=Independent, Mod I=Modified

## 2023-03-25 ENCOUNTER — APPOINTMENT (OUTPATIENT)
Dept: GENERAL RADIOLOGY | Age: 68
DRG: 215 | End: 2023-03-25
Payer: COMMERCIAL

## 2023-03-25 LAB
ANION GAP SERPL CALC-SCNC: 5 MMOL/L (ref 2–11)
BASOPHILS # BLD: 0 K/UL (ref 0–0.2)
BASOPHILS NFR BLD: 0 % (ref 0–2)
BUN SERPL-MCNC: 34 MG/DL (ref 8–23)
CALCIUM SERPL-MCNC: 8.4 MG/DL (ref 8.3–10.4)
CHLORIDE SERPL-SCNC: 112 MMOL/L (ref 101–110)
CO2 SERPL-SCNC: 25 MMOL/L (ref 21–32)
CREAT SERPL-MCNC: 1.1 MG/DL (ref 0.8–1.5)
DIFFERENTIAL METHOD BLD: ABNORMAL
EOSINOPHIL # BLD: 0.3 K/UL (ref 0–0.8)
EOSINOPHIL NFR BLD: 3 % (ref 0.5–7.8)
ERYTHROCYTE [DISTWIDTH] IN BLOOD BY AUTOMATED COUNT: 15.1 % (ref 11.9–14.6)
GLUCOSE BLD STRIP.AUTO-MCNC: 156 MG/DL (ref 65–100)
GLUCOSE BLD STRIP.AUTO-MCNC: 224 MG/DL (ref 65–100)
GLUCOSE BLD STRIP.AUTO-MCNC: 241 MG/DL (ref 65–100)
GLUCOSE BLD STRIP.AUTO-MCNC: 254 MG/DL (ref 65–100)
GLUCOSE SERPL-MCNC: 170 MG/DL (ref 65–100)
HCT VFR BLD AUTO: 28 % (ref 41.1–50.3)
HGB BLD-MCNC: 8.9 G/DL (ref 13.6–17.2)
IMM GRANULOCYTES # BLD AUTO: 0.1 K/UL (ref 0–0.5)
IMM GRANULOCYTES NFR BLD AUTO: 2 % (ref 0–5)
LYMPHOCYTES # BLD: 0.8 K/UL (ref 0.5–4.6)
LYMPHOCYTES NFR BLD: 9 % (ref 13–44)
MAGNESIUM SERPL-MCNC: 2.4 MG/DL (ref 1.8–2.4)
MCH RBC QN AUTO: 29.2 PG (ref 26.1–32.9)
MCHC RBC AUTO-ENTMCNC: 31.8 G/DL (ref 31.4–35)
MCV RBC AUTO: 91.8 FL (ref 82–102)
MONOCYTES # BLD: 0.8 K/UL (ref 0.1–1.3)
MONOCYTES NFR BLD: 9 % (ref 4–12)
NEUTS SEG # BLD: 6.7 K/UL (ref 1.7–8.2)
NEUTS SEG NFR BLD: 77 % (ref 43–78)
NRBC # BLD: 0.02 K/UL (ref 0–0.2)
PLATELET # BLD AUTO: 358 K/UL (ref 150–450)
PMV BLD AUTO: 9.7 FL (ref 9.4–12.3)
POTASSIUM SERPL-SCNC: 3.6 MMOL/L (ref 3.5–5.1)
RBC # BLD AUTO: 3.05 M/UL (ref 4.23–5.6)
SERVICE CMNT-IMP: ABNORMAL
SODIUM SERPL-SCNC: 142 MMOL/L (ref 133–143)
WBC # BLD AUTO: 8.6 K/UL (ref 4.3–11.1)

## 2023-03-25 PROCEDURE — 2580000003 HC RX 258: Performed by: INTERNAL MEDICINE

## 2023-03-25 PROCEDURE — 71046 X-RAY EXAM CHEST 2 VIEWS: CPT

## 2023-03-25 PROCEDURE — 6370000000 HC RX 637 (ALT 250 FOR IP): Performed by: INTERNAL MEDICINE

## 2023-03-25 PROCEDURE — 6360000002 HC RX W HCPCS: Performed by: INTERNAL MEDICINE

## 2023-03-25 PROCEDURE — 6370000000 HC RX 637 (ALT 250 FOR IP): Performed by: THORACIC SURGERY (CARDIOTHORACIC VASCULAR SURGERY)

## 2023-03-25 PROCEDURE — 36592 COLLECT BLOOD FROM PICC: CPT

## 2023-03-25 PROCEDURE — 99232 SBSQ HOSP IP/OBS MODERATE 35: CPT | Performed by: INTERNAL MEDICINE

## 2023-03-25 PROCEDURE — 85025 COMPLETE CBC W/AUTO DIFF WBC: CPT

## 2023-03-25 PROCEDURE — 94760 N-INVAS EAR/PLS OXIMETRY 1: CPT

## 2023-03-25 PROCEDURE — 80048 BASIC METABOLIC PNL TOTAL CA: CPT

## 2023-03-25 PROCEDURE — 2140000001 HC CVICU INTERMEDIATE R&B

## 2023-03-25 PROCEDURE — 2700000000 HC OXYGEN THERAPY PER DAY

## 2023-03-25 PROCEDURE — 2580000003 HC RX 258: Performed by: PHYSICIAN ASSISTANT

## 2023-03-25 PROCEDURE — 83735 ASSAY OF MAGNESIUM: CPT

## 2023-03-25 PROCEDURE — 82962 GLUCOSE BLOOD TEST: CPT

## 2023-03-25 PROCEDURE — 6370000000 HC RX 637 (ALT 250 FOR IP): Performed by: PHYSICIAN ASSISTANT

## 2023-03-25 PROCEDURE — 97530 THERAPEUTIC ACTIVITIES: CPT

## 2023-03-25 RX ORDER — FUROSEMIDE 10 MG/ML
40 INJECTION INTRAMUSCULAR; INTRAVENOUS 2 TIMES DAILY
Status: COMPLETED | OUTPATIENT
Start: 2023-03-25 | End: 2023-03-26

## 2023-03-25 RX ADMIN — FUROSEMIDE 40 MG: 10 INJECTION, SOLUTION INTRAMUSCULAR; INTRAVENOUS at 09:58

## 2023-03-25 RX ADMIN — INSULIN GLARGINE 26 UNITS: 100 INJECTION, SOLUTION SUBCUTANEOUS at 09:37

## 2023-03-25 RX ADMIN — POTASSIUM CHLORIDE 20 MEQ: 1500 TABLET, EXTENDED RELEASE ORAL at 06:56

## 2023-03-25 RX ADMIN — LEVOTHYROXINE SODIUM 175 MCG: 50 TABLET ORAL at 06:08

## 2023-03-25 RX ADMIN — ATORVASTATIN CALCIUM 80 MG: 80 TABLET, FILM COATED ORAL at 21:09

## 2023-03-25 RX ADMIN — METOPROLOL TARTRATE 25 MG: 25 TABLET, FILM COATED ORAL at 09:39

## 2023-03-25 RX ADMIN — SODIUM CHLORIDE, PRESERVATIVE FREE 10 ML: 5 INJECTION INTRAVENOUS at 09:59

## 2023-03-25 RX ADMIN — Medication 1 AMPULE: at 21:16

## 2023-03-25 RX ADMIN — TRAMADOL HYDROCHLORIDE 100 MG: 50 TABLET ORAL at 10:58

## 2023-03-25 RX ADMIN — FAMOTIDINE 20 MG: 20 TABLET, FILM COATED ORAL at 21:09

## 2023-03-25 RX ADMIN — METOPROLOL TARTRATE 25 MG: 25 TABLET, FILM COATED ORAL at 21:09

## 2023-03-25 RX ADMIN — AMIODARONE HYDROCHLORIDE 200 MG: 200 TABLET ORAL at 09:39

## 2023-03-25 RX ADMIN — FAMOTIDINE 20 MG: 20 TABLET, FILM COATED ORAL at 09:40

## 2023-03-25 RX ADMIN — SODIUM CHLORIDE, PRESERVATIVE FREE 10 ML: 5 INJECTION INTRAVENOUS at 21:13

## 2023-03-25 RX ADMIN — INSULIN LISPRO 4 UNITS: 100 INJECTION, SOLUTION INTRAVENOUS; SUBCUTANEOUS at 12:09

## 2023-03-25 RX ADMIN — INSULIN GLARGINE 26 UNITS: 100 INJECTION, SOLUTION SUBCUTANEOUS at 21:08

## 2023-03-25 RX ADMIN — FUROSEMIDE 40 MG: 10 INJECTION, SOLUTION INTRAMUSCULAR; INTRAVENOUS at 17:17

## 2023-03-25 RX ADMIN — Medication 1 AMPULE: at 09:39

## 2023-03-25 RX ADMIN — ACETAMINOPHEN 650 MG: 325 TABLET, FILM COATED ORAL at 09:38

## 2023-03-25 RX ADMIN — POTASSIUM CHLORIDE 10 MEQ: 750 TABLET, EXTENDED RELEASE ORAL at 09:39

## 2023-03-25 RX ADMIN — OXYCODONE AND ACETAMINOPHEN 1 TABLET: 5; 325 TABLET ORAL at 21:09

## 2023-03-25 RX ADMIN — SENNOSIDES AND DOCUSATE SODIUM 1 TABLET: 8.6; 5 TABLET ORAL at 09:38

## 2023-03-25 RX ADMIN — INSULIN LISPRO 8 UNITS: 100 INJECTION, SOLUTION INTRAVENOUS; SUBCUTANEOUS at 17:17

## 2023-03-25 RX ADMIN — POTASSIUM CHLORIDE 20 MEQ: 1500 TABLET, EXTENDED RELEASE ORAL at 09:40

## 2023-03-25 RX ADMIN — INSULIN LISPRO 4 UNITS: 100 INJECTION, SOLUTION INTRAVENOUS; SUBCUTANEOUS at 21:10

## 2023-03-25 RX ADMIN — ASPIRIN 81 MG: 81 TABLET ORAL at 09:39

## 2023-03-25 RX ADMIN — SODIUM CHLORIDE, PRESERVATIVE FREE 10 ML: 5 INJECTION INTRAVENOUS at 09:41

## 2023-03-25 RX ADMIN — AMIODARONE HYDROCHLORIDE 200 MG: 200 TABLET ORAL at 21:09

## 2023-03-25 RX ADMIN — AMLODIPINE BESYLATE 5 MG: 5 TABLET ORAL at 09:38

## 2023-03-25 ASSESSMENT — PAIN DESCRIPTION - LOCATION
LOCATION: INCISION

## 2023-03-25 ASSESSMENT — PAIN DESCRIPTION - FREQUENCY
FREQUENCY: CONTINUOUS
FREQUENCY: INTERMITTENT
FREQUENCY: INTERMITTENT

## 2023-03-25 ASSESSMENT — PAIN SCALES - GENERAL
PAINLEVEL_OUTOF10: 4
PAINLEVEL_OUTOF10: 0
PAINLEVEL_OUTOF10: 6
PAINLEVEL_OUTOF10: 2
PAINLEVEL_OUTOF10: 6
PAINLEVEL_OUTOF10: 1
PAINLEVEL_OUTOF10: 2
PAINLEVEL_OUTOF10: 0
PAINLEVEL_OUTOF10: 3

## 2023-03-25 ASSESSMENT — PAIN - FUNCTIONAL ASSESSMENT
PAIN_FUNCTIONAL_ASSESSMENT: PREVENTS OR INTERFERES SOME ACTIVE ACTIVITIES AND ADLS
PAIN_FUNCTIONAL_ASSESSMENT: ACTIVITIES ARE NOT PREVENTED
PAIN_FUNCTIONAL_ASSESSMENT: PREVENTS OR INTERFERES SOME ACTIVE ACTIVITIES AND ADLS

## 2023-03-25 ASSESSMENT — PAIN DESCRIPTION - ORIENTATION
ORIENTATION: MID

## 2023-03-25 ASSESSMENT — PAIN DESCRIPTION - DESCRIPTORS
DESCRIPTORS: ACHING

## 2023-03-25 ASSESSMENT — PAIN DESCRIPTION - PAIN TYPE
TYPE: SURGICAL PAIN
TYPE: SURGICAL PAIN

## 2023-03-25 ASSESSMENT — PAIN DESCRIPTION - ONSET
ONSET: ON-GOING
ONSET: GRADUAL
ONSET: GRADUAL

## 2023-03-25 NOTE — PROGRESS NOTES
Independent  Homemaking Assistance: Independent  Ambulation Assistance: Independent  Transfer Assistance: Independent  Active : Yes  Mode of Transportation: Car  Occupation: Full time employment  OBJECTIVE:     PAIN: VITALS / O2: PRECAUTION / Severa Broom / DRAINS:   Pre Treatment:   Pain Assessment: None - Denies Pain      Post Treatment: 0 Vitals        Oxygen    Telemetry     RESTRICTIONS/PRECAUTIONS:        MOBILITY: I Mod I S SBA CGA Min Mod Max Total  NT x2 Comments:   Bed Mobility    Rolling [] [] [] [] [] [] [] [] [] [] []    Supine to Sit [] [] [] [] [] [] [x] [] [] [] []    Scooting [] [] [] [] [] [] [] [] [] [] []    Sit to Supine [] [] [] [] [] [] [] [] [] [] []    Transfers    Sit to Stand [] [] [] [] [] [] [x] [] [] [] []    Bed to Chair [] [] [] [] [] [] [] [] [] [] []    Stand to Sit [] [] [] [] [] [] [x] [] [] [] []     [] [] [] [] [] [] [] [] [] [] []    I=Independent, Mod I=Modified Independent, S=Supervision, SBA=Standby Assistance, CGA=Contact Guard Assistance,   Min=Minimal Assistance, Mod=Moderate Assistance, Max=Maximal Assistance, Total=Total Assistance, NT=Not Tested    BALANCE: Good Fair+ Fair Fair- Poor NT Comments   Sitting Static [x] [] [] [] [] []    Sitting Dynamic [] [x] [] [] [] []              Standing Static [] [] [x] [] [] [] With UE support on rolling walker   Standing Dynamic [] [] [] [x] [] [] With UE support on rolling walker     GAIT: I Mod I S SBA CGA Min Mod Max Total  NT x2 Comments:   Level of Assistance [] [] [] [] [] [x] [x] [] [] [] [] With chair follow for safety   Distance   100'    DME Gait Belt and Rolling Walker    Gait Quality Decreased harshil , Decreased step clearance, and Decreased step length    Weightbearing Status      Stairs      I=Independent, Mod I=Modified Independent, S=Supervision, SBA=Standby Assistance, CGA=Contact Guard Assistance,   Min=Minimal Assistance, Mod=Moderate Assistance, Max=Maximal Assistance, Total=Total Assistance, NT=Not

## 2023-03-25 NOTE — PROGRESS NOTES
(ULTRAM) tablet 100 mg  100 mg Oral Q6H PRN    magnesium oxide (MAG-OX) tablet 400 mg  400 mg Oral TID PRN    magnesium oxide (MAG-OX) tablet 400 mg  400 mg Oral 4x Daily PRN    potassium chloride (KLOR-CON M) extended release tablet 20 mEq  20 mEq Oral BID PRN    sodium chloride flush 0.9 % injection 5-40 mL  5-40 mL IntraVENous 2 times per day    0.9 % sodium chloride infusion  25 mL IntraVENous PRN    0.9 % sodium chloride infusion   IntraVENous PRN    metoprolol tartrate (LOPRESSOR) tablet 25 mg  25 mg Oral BID    acetaminophen (TYLENOL) tablet 650 mg  650 mg Oral Q4H PRN    allopurinol (ZYLOPRIM) tablet 100 mg  100 mg Oral Once per day on Mon Thu    alcohol 62% (NOZIN) nasal  1 ampule  1 ampule Topical Q12H    aspirin EC tablet 81 mg  81 mg Oral Daily    oxyCODONE-acetaminophen (PERCOCET) 5-325 MG per tablet 1 tablet  1 tablet Oral Q4H PRN    amiodarone (CORDARONE) tablet 200 mg  200 mg Oral BID    atorvastatin (LIPITOR) tablet 80 mg  80 mg Oral Nightly    levothyroxine (SYNTHROID) tablet 175 mcg  175 mcg Oral QAM AC    glucagon (rDNA) injection 1 mg  1 mg SubCUTAneous PRN    [Held by provider] fenofibrate (TRIGLIDE) tablet 160 mg  160 mg Oral Daily    potassium chloride (KLOR-CON M) extended release tablet 40 mEq  40 mEq Oral PRN    aluminum & magnesium hydroxide-simethicone (MAALOX) 200-200-20 MG/5ML suspension 30 mL  30 mL Oral Q6H PRN       EXAM  GEN : not in resp.  Distress   HEENT: anicteric sclerae, normocephalic  Neck - trachea midline, atraumatic  Lung - equal chest rise, decreased BS   CV: RR, no M, no gallop or rub   Abd: soft, not tender   Ext - no cyanosis, +edema  Skin - no rashes, no purpura       Recent Labs     03/24/23  0552 03/25/23  0413   WBC 9.4 8.6   HGB 8.8* 8.9*   HCT 27.8* 28.0*    358          Recent Labs     03/23/23  0436 03/24/23  0552 03/25/23  0413   * 143 142   K 4.8 3.8 3.6   * 114* 112*   CO2 26 26 25   BUN 54* 39* 34*   CREATININE 1.80* 1.40 1.10 GLUCOSE 376* 283* 170*   CALCIUM 8.7 8.5 8.4           Lab Results   Component Value Date    CALCIUM 8.4 03/25/2023     Lab Results   Component Value Date    LABALBU 2.6 (L) 03/16/2023         Assessment and Plan:   ANNMARIE on CKD 3b  - ANNMARIE consistent with ATN from cardiac arrest and CABG. Contrast exposure   Volume overload: better   On Lasix   Renal recovered     Hypernatremia - resolved     ACS / 2 cardiac arrests / CABG x 4 / Impella    Resp.  Failure; better , off vent     DM2 -     Sign off   Thanks   Sirena Carvalho MD  [facility]

## 2023-03-25 NOTE — PROGRESS NOTES
and propofol. Amiodarone dose was reduced after persistent bradycardia developed. Pt had urgent CABG x4 on 3/17, impella placed. Notable PMH:  has a past medical history of Diabetes ([address] GERD (gastroesophageal reflux disease), Gout, Hypercholesterolemia, and Thyroid disease. 24 Hour events:   No events overnight , finished walking with PT still on O2 at 6L on lasix but still positive balance and edematous. Review of Systems: Comprehensive ROS negative except in HPI    Lines: (insertion date)    Urinary Catheter 03/16/23 Baeza (Active)     PICC Double Lumen 65/98/53 Right Basilic (Active)     Drips: current dose (range)  Dose (mg/hr) Midazolam: 0 mg/hr  Dose (mcg/kg/min) Propofol : 0 mcg/kg/min (per Malachi Shay NP w/ cardiology)  Dose (mcg/kg/hr) Dexmedetomidine: 0 mcg/kg/hr  Dose (mcg/hr) Fentanyl: 0 mcg/hr  Dose (mcg/min) Epinephrine: 0 mcg/min  Dose (mcg/min) Norepinephrine: 1.087 mcg/min (B/P-132/79   (93)  SVR-[id number])  Dose (mcg/kg/min) Dopamine: 0 mcg/kg/min (B/P-165/100   (122)  HR-96)  Dose (mcg/min) Phenylephrine : *50 mcg  Dose (mg/hr) Nicardipine: *0.6 mg  Dose (mcg/kg/min) Dobutamine: 0 mcg/kg/min (HR-117    RR-21  SPO2-100%  B/P-136/76   (90)  PA-43/23   (31)  CVP-20  CO-7.3  CI-3.2  SvO2-75      With increasing HR there is more multi-focal PVC's and apparent irritability of the heart.)  Dose (units/hr) Heparin: *0 Units  Dose (units/hr) Insulin : 0 Units/hr  Dose (mg/min) Amiodarone: 0 mg/min     Pertinent Exam:         Blood pressure (!) 148/75, pulse 88, temperature 97.9 °F (36.6 °C), temperature source Oral, resp. rate 20, height 6' (1.829 m), weight 238 lb (108 kg), SpO2 99 %.    Intake/Output Summary (Last 24 hours) at 3/25/2023 4998  Last data filed at 3/25/2023 0900  Gross per 24 hour   Intake 1062 ml   Output 1000 ml   Net 62 ml       Constitutional: nad  EENMT:  Sclera clear, pupils equal, oral mucosa moist  Respiratory: clear  Cardiovascular: rrr  Gastrointestinal:  soft Updated: 03/15/23 1743     Adenovirus by PCR NOT DETECTED        Coronavirus 229E by PCR NOT DETECTED        Coronavirus HKU1 by PCR NOT DETECTED        Coronavirus NL63 by PCR NOT DETECTED        Coronavirus OC43 by PCR NOT DETECTED        SARS-CoV-2, PCR NOT DETECTED        Human Metapneumovirus by PCR NOT DETECTED        Rhinovirus Enterovirus PCR NOT DETECTED        Influenza A by PCR NOT DETECTED        Influenza B PCR NOT DETECTED        Parainfluenza 1 PCR NOT DETECTED        Parainfluenza 2 PCR NOT DETECTED        Parainfluenza 3 PCR NOT DETECTED        Parainfluenza 4 PCR NOT DETECTED        Respiratory Syncytial Virus by PCR NOT DETECTED        Bordetella parapertussis by PCR NOT DETECTED        Bordetella pertussis by PCR NOT DETECTED        Chlamydophila Pneumonia PCR NOT DETECTED        Mycoplasma pneumo by PCR NOT DETECTED       Influenza A/B, Molecular [[id number]]     Order Status: Canceled Specimen: Nasal     Influenza A/B, Molecular [[id number]] Collected: 03/15/23 1057    Order Status: Completed Specimen: Not Specified Updated: 03/15/23 1121     Influenza A, JONY Not detected        Comment: Negative results do not preclude infection with influenza virus and should not be the sole basis of a patient treatment decision. Influenza B, JONY Not detected       COVID-19, Rapid [[id number]] Collected: 03/15/23 1057    Order Status: Completed Specimen: Nasopharyngeal Updated: 03/15/23 1121     Source Nasopharyngeal        SARS-CoV-2, Rapid Not detected        Comment:      The specimen is NEGATIVE for SARS-CoV-2, the novel coronavirus associated with COVID-19. A negative result does not rule out COVID-19. This test has been authorized by the FDA under an Emergency Use Authorization (EUA) for use by authorized laboratories.         Fact sheet for Healthcare Providers: ConventionUpdate.co.nz  Fact sheet for Patients: ConventionUpdate.co.nz       Methodology:

## 2023-03-25 NOTE — PROGRESS NOTES
3/25/2023 9:15 AM    Admit Date: 3/15/2023    Admit Diagnosis: STEMI (ST elevation myocardial infarction) ([facility] [I21.3]  Acute myocardial infarction, subendocardial infarction, initial episode of care ([facility] [I21.4]      Subjective:   No chest pain or shortness of breath      Objective:    BP (!) 148/75   Pulse 88   Temp 97.9 °F (36.6 °C) (Oral)   Resp 20   Ht 6' (1.829 m)   Wt 238 lb (108 kg)   SpO2 99%   BMI 32.28 kg/m²     Physical Exam:  Tree Jose, Well Nourished, No Acute Distress, Alert & Oriented x 3, appropriate mood. Neck- supple, no JVD  CV- regular rate and rhythm no MRG  Lung- clear bilaterally  Abd- soft, nontender, nondistended  Ext- no edema bilaterally. Skin- warm and dry        Data Review:   Recent Labs     03/25/23  0413      K 3.6   BUN 34*   WBC 8.6   HGB 8.9*   HCT 28.0*          Assessment/Plan:     Active Hospital Problems    ANNMARIE (acute kidney injury) ([facility]      Acute pulmonary edema (HCC)      NSTEMI (non-ST elevated myocardial infarction) (HCC)/CABG. Doing well. Spirometry stressed      S/P CABG x 4      Pleural effusion      Acute myocardial infarction, subendocardial infarction, initial episode of care [facility])      History of coronary artery stent placement      Cardiac arrest ([facility]      Acute respiratory failure with hypoxia (HCC)      Ventricular arrhythmia      Essential hypertension blood pressure is elevated but trending down. Norvasc was last added yesterday. Will monitor.       Hyperlipidemia      Diabetes mellitus type 1.5 ([facility]

## 2023-03-25 NOTE — PROGRESS NOTES
CV Progress Note    Subjective: Incisional pain:  minimal  Appetite:  good   Activity: Ambulating with assistance      Objective:     Vitals:  Blood pressure 139/74, pulse 77, temperature 98.2 °F (36.8 °C), temperature source Oral, resp. rate 16, height 6' (1.829 m), weight 238 lb (108 kg), SpO2 98 %. Temp (24hrs), Av.2 °F (36.8 °C), Min:97.7 °F (36.5 °C), Max:98.6 °F (37 °C)        Plan/Recommendations/Medical Decision Making:     Principal Problem:    S/P CABG x 4  Active Problems:    NSTEMI (non-ST elevated myocardial infarction) ([facility] [address]    ANNMARIE (acute kidney injury) ([facility] [address]    Acute pulmonary edema (HCC)    History of coronary artery stent placement    Cardiac arrest ([facility] [address]    Acute respiratory failure with hypoxia (HCC)    Ventricular arrhythmia    Acute myocardial infarction, subendocardial infarction, initial episode of care ([facility] [address]    Pleural effusion    Diabetes mellitus type 1.5 ([facility] [address]    Essential hypertension    Hyperlipidemia  Resolved Problems:    * No resolved hospital problems. *      Continue present treatment    See orders for details. Shayy Mccarthy.  Walter Tran MD

## 2023-03-26 LAB
ANION GAP SERPL CALC-SCNC: 3 MMOL/L (ref 2–11)
BASOPHILS # BLD: 0 K/UL (ref 0–0.2)
BASOPHILS NFR BLD: 0 % (ref 0–2)
BUN SERPL-MCNC: 27 MG/DL (ref 8–23)
CALCIUM SERPL-MCNC: 8.3 MG/DL (ref 8.3–10.4)
CHLORIDE SERPL-SCNC: 109 MMOL/L (ref 101–110)
CO2 SERPL-SCNC: 27 MMOL/L (ref 21–32)
CREAT SERPL-MCNC: 1.2 MG/DL (ref 0.8–1.5)
DIFFERENTIAL METHOD BLD: ABNORMAL
EOSINOPHIL # BLD: 0.3 K/UL (ref 0–0.8)
EOSINOPHIL NFR BLD: 3 % (ref 0.5–7.8)
ERYTHROCYTE [DISTWIDTH] IN BLOOD BY AUTOMATED COUNT: 15.1 % (ref 11.9–14.6)
GLUCOSE BLD STRIP.AUTO-MCNC: 102 MG/DL (ref 65–100)
GLUCOSE BLD STRIP.AUTO-MCNC: 137 MG/DL (ref 65–100)
GLUCOSE BLD STRIP.AUTO-MCNC: 167 MG/DL (ref 65–100)
GLUCOSE BLD STRIP.AUTO-MCNC: 281 MG/DL (ref 65–100)
GLUCOSE SERPL-MCNC: 103 MG/DL (ref 65–100)
HCT VFR BLD AUTO: 28.7 % (ref 41.1–50.3)
HGB BLD-MCNC: 9.1 G/DL (ref 13.6–17.2)
IMM GRANULOCYTES # BLD AUTO: 0.1 K/UL (ref 0–0.5)
IMM GRANULOCYTES NFR BLD AUTO: 2 % (ref 0–5)
LYMPHOCYTES # BLD: 0.9 K/UL (ref 0.5–4.6)
LYMPHOCYTES NFR BLD: 11 % (ref 13–44)
MAGNESIUM SERPL-MCNC: 2.1 MG/DL (ref 1.8–2.4)
MCH RBC QN AUTO: 29.2 PG (ref 26.1–32.9)
MCHC RBC AUTO-ENTMCNC: 31.7 G/DL (ref 31.4–35)
MCV RBC AUTO: 92 FL (ref 82–102)
MONOCYTES # BLD: 0.6 K/UL (ref 0.1–1.3)
MONOCYTES NFR BLD: 8 % (ref 4–12)
NEUTS SEG # BLD: 5.8 K/UL (ref 1.7–8.2)
NEUTS SEG NFR BLD: 76 % (ref 43–78)
NRBC # BLD: 0.02 K/UL (ref 0–0.2)
PLATELET # BLD AUTO: 419 K/UL (ref 150–450)
PMV BLD AUTO: 10 FL (ref 9.4–12.3)
POTASSIUM SERPL-SCNC: 3.5 MMOL/L (ref 3.5–5.1)
RBC # BLD AUTO: 3.12 M/UL (ref 4.23–5.6)
SERVICE CMNT-IMP: ABNORMAL
SODIUM SERPL-SCNC: 139 MMOL/L (ref 133–143)
WBC # BLD AUTO: 7.7 K/UL (ref 4.3–11.1)

## 2023-03-26 PROCEDURE — 6370000000 HC RX 637 (ALT 250 FOR IP): Performed by: THORACIC SURGERY (CARDIOTHORACIC VASCULAR SURGERY)

## 2023-03-26 PROCEDURE — 6370000000 HC RX 637 (ALT 250 FOR IP): Performed by: INTERNAL MEDICINE

## 2023-03-26 PROCEDURE — 2580000003 HC RX 258: Performed by: PHYSICIAN ASSISTANT

## 2023-03-26 PROCEDURE — 6370000000 HC RX 637 (ALT 250 FOR IP): Performed by: PHYSICIAN ASSISTANT

## 2023-03-26 PROCEDURE — 80048 BASIC METABOLIC PNL TOTAL CA: CPT

## 2023-03-26 PROCEDURE — 83735 ASSAY OF MAGNESIUM: CPT

## 2023-03-26 PROCEDURE — 2700000000 HC OXYGEN THERAPY PER DAY

## 2023-03-26 PROCEDURE — 36592 COLLECT BLOOD FROM PICC: CPT

## 2023-03-26 PROCEDURE — 94760 N-INVAS EAR/PLS OXIMETRY 1: CPT

## 2023-03-26 PROCEDURE — 6360000002 HC RX W HCPCS: Performed by: INTERNAL MEDICINE

## 2023-03-26 PROCEDURE — 99232 SBSQ HOSP IP/OBS MODERATE 35: CPT | Performed by: INTERNAL MEDICINE

## 2023-03-26 PROCEDURE — 85025 COMPLETE CBC W/AUTO DIFF WBC: CPT

## 2023-03-26 PROCEDURE — 2140000001 HC CVICU INTERMEDIATE R&B

## 2023-03-26 PROCEDURE — 2580000003 HC RX 258: Performed by: INTERNAL MEDICINE

## 2023-03-26 PROCEDURE — 82962 GLUCOSE BLOOD TEST: CPT

## 2023-03-26 PROCEDURE — 97530 THERAPEUTIC ACTIVITIES: CPT

## 2023-03-26 RX ORDER — LANOLIN ALCOHOL/MO/W.PET/CERES
6 CREAM (GRAM) TOPICAL
Status: DISCONTINUED | OUTPATIENT
Start: 2023-03-26 | End: 2023-03-27 | Stop reason: HOSPADM

## 2023-03-26 RX ORDER — GUAIFENESIN 600 MG/1
1200 TABLET, EXTENDED RELEASE ORAL 2 TIMES DAILY
Status: DISCONTINUED | OUTPATIENT
Start: 2023-03-26 | End: 2023-03-27 | Stop reason: HOSPADM

## 2023-03-26 RX ORDER — GUAIFENESIN 600 MG/1
TABLET, EXTENDED RELEASE ORAL
Status: DISPENSED
Start: 2023-03-26 | End: 2023-03-26

## 2023-03-26 RX ADMIN — SODIUM CHLORIDE, PRESERVATIVE FREE 10 ML: 5 INJECTION INTRAVENOUS at 21:22

## 2023-03-26 RX ADMIN — AMLODIPINE BESYLATE 5 MG: 5 TABLET ORAL at 09:26

## 2023-03-26 RX ADMIN — Medication 1 AMPULE: at 21:17

## 2023-03-26 RX ADMIN — INSULIN LISPRO 8 UNITS: 100 INJECTION, SOLUTION INTRAVENOUS; SUBCUTANEOUS at 11:55

## 2023-03-26 RX ADMIN — ASPIRIN 81 MG: 81 TABLET ORAL at 09:25

## 2023-03-26 RX ADMIN — GUAIFENESIN 1200 MG: 600 TABLET ORAL at 21:21

## 2023-03-26 RX ADMIN — Medication 6 MG: at 21:18

## 2023-03-26 RX ADMIN — ATORVASTATIN CALCIUM 80 MG: 80 TABLET, FILM COATED ORAL at 21:18

## 2023-03-26 RX ADMIN — Medication 1 AMPULE: at 09:26

## 2023-03-26 RX ADMIN — INSULIN GLARGINE 26 UNITS: 100 INJECTION, SOLUTION SUBCUTANEOUS at 09:25

## 2023-03-26 RX ADMIN — SODIUM CHLORIDE, PRESERVATIVE FREE 10 ML: 5 INJECTION INTRAVENOUS at 09:28

## 2023-03-26 RX ADMIN — POTASSIUM CHLORIDE 20 MEQ: 1500 TABLET, EXTENDED RELEASE ORAL at 17:52

## 2023-03-26 RX ADMIN — AMIODARONE HYDROCHLORIDE 200 MG: 200 TABLET ORAL at 09:26

## 2023-03-26 RX ADMIN — AMIODARONE HYDROCHLORIDE 200 MG: 200 TABLET ORAL at 21:19

## 2023-03-26 RX ADMIN — FAMOTIDINE 20 MG: 20 TABLET, FILM COATED ORAL at 09:26

## 2023-03-26 RX ADMIN — LEVOTHYROXINE SODIUM 175 MCG: 50 TABLET ORAL at 06:28

## 2023-03-26 RX ADMIN — FUROSEMIDE 40 MG: 10 INJECTION, SOLUTION INTRAMUSCULAR; INTRAVENOUS at 09:25

## 2023-03-26 RX ADMIN — INSULIN GLARGINE 26 UNITS: 100 INJECTION, SOLUTION SUBCUTANEOUS at 21:17

## 2023-03-26 RX ADMIN — GUAIFENESIN 1200 MG: 600 TABLET ORAL at 09:50

## 2023-03-26 RX ADMIN — POTASSIUM CHLORIDE 20 MEQ: 1500 TABLET, EXTENDED RELEASE ORAL at 09:27

## 2023-03-26 RX ADMIN — METOPROLOL TARTRATE 25 MG: 25 TABLET, FILM COATED ORAL at 09:26

## 2023-03-26 RX ADMIN — FUROSEMIDE 40 MG: 10 INJECTION, SOLUTION INTRAMUSCULAR; INTRAVENOUS at 17:52

## 2023-03-26 RX ADMIN — SODIUM CHLORIDE, PRESERVATIVE FREE 10 ML: 5 INJECTION INTRAVENOUS at 21:21

## 2023-03-26 RX ADMIN — METOPROLOL TARTRATE 25 MG: 25 TABLET, FILM COATED ORAL at 21:19

## 2023-03-26 RX ADMIN — POTASSIUM CHLORIDE 10 MEQ: 750 TABLET, EXTENDED RELEASE ORAL at 09:26

## 2023-03-26 RX ADMIN — FAMOTIDINE 20 MG: 20 TABLET, FILM COATED ORAL at 21:18

## 2023-03-26 ASSESSMENT — PAIN SCALES - GENERAL: PAINLEVEL_OUTOF10: 0

## 2023-03-26 NOTE — PLAN OF CARE
Problem: Chronic Conditions and Co-morbidities  Goal: Patient's chronic conditions and co-morbidity symptoms are monitored and maintained or improved  3/26/2023 1517 by Robinson Fishman RN  Outcome: Progressing  3/26/2023 1517 by Robinson Fishman RN  Outcome: Not Progressing     Problem: Discharge Planning  Goal: Discharge to home or other facility with appropriate resources  3/26/2023 1517 by Robinson Fishman RN  Outcome: Progressing  3/26/2023 1517 by Robinson Fishman RN  Outcome: Not Progressing     Problem: Safety - Adult  Goal: Free from fall injury  3/26/2023 1517 by Robinson Fishman RN  Outcome: Progressing  3/26/2023 1517 by Robinson Fishman RN  Outcome: Not Progressing     Problem: ABCDS Injury Assessment  Goal: Absence of physical injury  3/26/2023 1517 by Robinson Fishman RN  Outcome: Progressing  3/26/2023 1517 by Robinson Fishman RN  Outcome: Not Progressing     Problem: Safety - Medical Restraint  Goal: Remains free of injury from restraints (Restraint for Interference with Medical Device)  Description: INTERVENTIONS:  1. Determine that other, less restrictive measures have been tried or would not be effective before applying the restraint  2. Evaluate the patient's condition at the time of restraint application  3. Inform patient/family regarding the reason for restraint  4.  Q2H: Monitor safety, psychosocial status, comfort, nutrition and hydration  3/26/2023 1517 by Robinson Fishman RN  Outcome: Progressing  3/26/2023 1517 by Robinson Fishman RN  Outcome: Not Progressing     Problem: Pain  Goal: Verbalizes/displays adequate comfort level or baseline comfort level  3/26/2023 1517 by Robinson Fishman RN  Outcome: Progressing  3/26/2023 1517 by Robinson Fishman RN  Outcome: Not Progressing  Flowsheets (Taken 3/26/2023 0708)  Verbalizes/displays adequate comfort level or baseline comfort level:   Encourage patient to monitor pain and request assistance   Assess pain using appropriate pain scale     Problem: Skin/Tissue Integrity  Goal:
Problem: Chronic Conditions and Co-morbidities  Goal: Patient's chronic conditions and co-morbidity symptoms are monitored and maintained or improved  Outcome: Progressing  Flowsheets (Taken 3/19/2023 1930)  Care Plan - Patient's Chronic Conditions and Co-Morbidity Symptoms are Monitored and Maintained or Improved:   Monitor and assess patient's chronic conditions and comorbid symptoms for stability, deterioration, or improvement   Collaborate with multidisciplinary team to address chronic and comorbid conditions and prevent exacerbation or deterioration   Update acute care plan with appropriate goals if chronic or comorbid symptoms are exacerbated and prevent overall improvement and discharge     Problem: Discharge Planning  Goal: Discharge to home or other facility with appropriate resources  Outcome: Progressing  Flowsheets (Taken 3/19/2023 1930)  Discharge to home or other facility with appropriate resources:   Identify barriers to discharge with patient and caregiver   Arrange for needed discharge resources and transportation as appropriate   Identify discharge learning needs (meds, wound care, etc)   Refer to discharge planning if patient needs post-hospital services based on physician order or complex needs related to functional status, cognitive ability or social support system     Problem: Safety - Adult  Goal: Free from fall injury  Outcome: Progressing  Flowsheets (Taken 3/19/2023 1930)  Free From Fall Injury: Instruct family/caregiver on patient safety     Problem: ABCDS Injury Assessment  Goal: Absence of physical injury  Outcome: Progressing     Problem: Safety - Medical Restraint  Goal: Remains free of injury from restraints (Restraint for Interference with Medical Device)  Description: INTERVENTIONS:  1. Determine that other, less restrictive measures have been tried or would not be effective before applying the restraint  2. Evaluate the patient's condition at the time of restraint application  3.
Problem: Respiratory - Adult  Goal: Achieves optimal ventilation and oxygenation  Outcome: Progressing
level or baseline comfort level: Consider cultural and social influences on pain and pain management     Problem: Skin/Tissue Integrity  Goal: Absence of new skin breakdown  Description: 1. Monitor for areas of redness and/or skin breakdown  2. Assess vascular access sites hourly  3. Every 4-6 hours minimum:  Change oxygen saturation probe site  4. Every 4-6 hours:  If on nasal continuous positive airway pressure, respiratory therapy assess nares and determine need for appliance change or resting period.   Outcome: Progressing

## 2023-03-26 NOTE — PROGRESS NOTES
3/26/2023 10:11 AM    Admit Date: 3/15/2023    Admit Diagnosis: STEMI (ST elevation myocardial infarction) ([facility] [I21.3]  Acute myocardial infarction, subendocardial infarction, initial episode of care ([facility] [I21.4]      Subjective:   No cp or sob      Objective:    /66   Pulse 86   Temp 97.7 °F (36.5 °C) (Oral)   Resp 20   Ht 6' (1.829 m)   Wt 238 lb (108 kg)   SpO2 96%   BMI 32.28 kg/m²     Physical Exam:  Jimmey Renato, Well Nourished, No Acute Distress, Alert & Oriented x 3, appropriate mood. Neck- supple, no JVD  CV- regular rate and rhythm no MRG  Lung- clear bilaterally  Abd- soft, nontender, nondistended  Ext- no edema bilaterally. Skin- warm and dry        Data Review:   Recent Labs     03/26/23  0530      K 3.5   BUN 27*   WBC 7.7   HGB 9.1*   HCT 28.7*          Assessment/Plan:     Active Hospital Problems    ANNMARIE (acute kidney injury) ([facility]      Acute pulmonary edema (HCC)      NSTEMI (non-ST elevated myocardial infarction) ([facility]      S/P CABG x 4. Stable. Continue current medical therapy. In nsr      Pleural effusion      Acute myocardial infarction, subendocardial infarction, initial episode of care [facility])      History of coronary artery stent placement      Cardiac arrest ([facility]      Acute respiratory failure with hypoxia (HCC)      Ventricular arrhythmia      Essential hypertension Improved with current therapy.  Will continue medications  Normal with addition of norvasc      Hyperlipidemia      Diabetes mellitus type 1.5 ([facility]

## 2023-03-26 NOTE — PROGRESS NOTES
a visually estimated EF of 50 - 55%. Left ventricle size is normal. Normal wall thickness. See diagram for wall motion findings. Abnormal diastolic function. Aortic Valve: Mild sclerosis of the aortic valve cusp. Mitral Valve: Mild regurgitation. Tricuspid Valve: Mild regurgitation. Left Atrium: Left atrium is mildly dilated. Contrast used: Definity. Antibiotics:  Inpat Anti-Infectives (From admission, onward)      None          Microbiology:   Results       Procedure Component Value Units Date/Time    Culture, Blood 1 [[id number]] Collected: 03/18/23 0755    Order Status: Completed Specimen: Blood Updated: 03/23/23 0756     Special Requests --        RIGHT  HAND       Culture NO GROWTH 5 DAYS       Culture, Respiratory [[id number]]  (Abnormal)  (Susceptibility) Collected: 03/18/23 0755    Order Status: Completed Specimen: Endotracheal Updated: 03/21/23 0702     Special Requests NO SPECIAL REQUESTS        Gram stain 3 TO 30 WBCS SEEN /OIF      0 EPITHELIAL CELLS SEEN      FEW Gram positive cocci         FEW GRAM POSITIVE RODS         3+ MUCOUS        Culture       MODERATE Staphylococcus aureus                  MODERATE NORMAL RESPIRATORY MORENA          Susceptibility        Staphylococcus aureus      BACTERIAL SUSCEPTIBILITY PANEL COLLEEN      ceFAZolin <=2 ug/mL Sensitive      clindamycin <=0.5 ug/mL Sensitive      oxacillin 0.5 ug/mL Sensitive      rifampin <=0.5 ug/mL Sensitive  [1]       tetracycline <=0.5 ug/mL Sensitive      trimethoprim-sulfamethoxazole <=0.5/9.5 ug/mL Sensitive      vancomycin 1 ug/mL Sensitive                   [1]  Rifampin is not to be used for mono-therapy.                    Culture, Blood 1 [[id number]] Collected: 03/18/23 0754    Order Status: Completed Specimen: Blood Updated: 03/23/23 0756     Special Requests --        LEFT  HAND  ARTL (ART LINE)       Culture NO GROWTH 5 DAYS       Culture, Urine [[id number]] Collected: 03/18/23 0744    Order Status: Completed Specimen: Urine Updated: 03/20/23 0906     Special Requests NO SPECIAL REQUESTS        Culture NO GROWTH 2 DAYS       MSSA/MRSA Screen BY PCR [[id number]]  (Abnormal) Collected: 03/16/23 2038    Order Status: Completed Specimen: Swab from Nares Updated: 03/17/23 0620     Special Requests NO SPECIAL REQUESTS        Culture       MRSA target DNA not detected, SA target DNA detected. A MRSA negative, SA positive test result does not preclude MRSA nasal colonization. MSSA/MRSA Screen BY PCR [[id number]]  (Abnormal) Collected: 03/15/23 1543    Order Status: Completed Specimen: Swab from Nares Updated: 03/15/23 1759     Special Requests NO SPECIAL REQUESTS        Culture       MRSA target DNA not detected, SA target DNA detected. A MRSA negative, SA positive test result does not preclude MRSA nasal colonization.           Respiratory Panel, Molecular, with COVID-19 (Restricted: peds pts or suitable admitted adults) [[id number]] Collected: 03/15/23 1542    Order Status: Completed Specimen: Nasopharyngeal Updated: 03/15/23 1743     Adenovirus by PCR NOT DETECTED        Coronavirus 229E by PCR NOT DETECTED        Coronavirus HKU1 by PCR NOT DETECTED        Coronavirus NL63 by PCR NOT DETECTED        Coronavirus OC43 by PCR NOT DETECTED        SARS-CoV-2, PCR NOT DETECTED        Human Metapneumovirus by PCR NOT DETECTED        Rhinovirus Enterovirus PCR NOT DETECTED        Influenza A by PCR NOT DETECTED        Influenza B PCR NOT DETECTED        Parainfluenza 1 PCR NOT DETECTED        Parainfluenza 2 PCR NOT DETECTED        Parainfluenza 3 PCR NOT DETECTED        Parainfluenza 4 PCR NOT DETECTED        Respiratory Syncytial Virus by PCR NOT DETECTED        Bordetella parapertussis by PCR NOT DETECTED        Bordetella pertussis by PCR NOT DETECTED        Chlamydophila Pneumonia PCR NOT DETECTED        Mycoplasma pneumo by PCR NOT DETECTED       Influenza A/B, Molecular [[id number]]     Order Status: Canceled

## 2023-03-26 NOTE — PROGRESS NOTES
ACUTE PHYSICAL THERAPY GOALS:   (Developed with and agreed upon by patient and/or caregiver.)   Mr. Joy Nowak will perform supine to sit and sit to supine with bed flat and no rails independently in 7 days. Mr. Joy Nowak will perform sit to stand and bed to chair independently in 7 days. Mr. Joy Nowak will perform gait 200 ft with least restrictive device independently in 7 days. Mr. Joy Nowak will perform therex x 25 reps in sitting in 7 days. PHYSICAL THERAPY: Daily Note PM   (Link to Caseload Tracking: PT Visit Days : 4  Time In/Out PT Charge Capture  Rehab Caseload Tracker  Orders    Radha Escudero is a 74 y.o. male   PRIMARY DIAGNOSIS: S/P CABG x 4  STEMI (ST elevation myocardial infarction) ([facility] [I21.3]  Acute myocardial infarction, subendocardial infarction, initial episode of care ([facility] [I21.4]  Procedure(s) (LRB):  EXPLANT OF IMPELLA (N/A)  5 Days Post-Op  Inpatient: Payor: Denver Huggins / Plan: LAST SC / Product Type: *No Product type* /     ASSESSMENT:     REHAB RECOMMENDATIONS:   Recommendation to date pending progress:  Setting:  Short-term Rehab  Pending length of stay and progress    Equipment:    To Be Determined     ASSESSMENT:  Mr. Joy Nowak was supine upon contact and agreeable to PT. Patient performed supine to sit with SBA and cues for technique. Patient then transferred to standing with min assist and cues for hand placement/technique. Once standing patient ambulated 200' with rolling walker, CGA-min assist due to slightly unsteady gait,  and cues for sequencing/posture/pursed lipped breathing. Patient returned to EOB where he performed LE exercises with cues for improved quality of exercise. Patient returned to supine with min assist where he was positioned for comfort. Overall steady progress. Will continue PT efforts.      SUBJECTIVE:   Mr. Joy Nowak states, \"Tomorrow is the day I will go home\"     Social/Functional Lives With: Spouse  Type of Home: House  Home Equipment: None  ADL Assistance: DURATION: BID for duration of hospital stay or until stated goals are met, whichever comes first.    TREATMENT:   TREATMENT:   Therapeutic Activity (25 Minutes): Therapeutic activity included Supine to Sit, Sit to Supine, Scooting, Transfer Training, Ambulation on level ground, Sitting balance , Standing balance, and LE exercises to improve functional Activity tolerance, Balance, Mobility, and Strength. TREATMENT GRID:     DATE: 3/24/23 am       Ambulation        Hip Flexion X10 AB       Long Arc Quads X10 AB       Hip ab/ad        Heel Raises        Toe Raises X10 AB       Shoulder shrugs X10 AB       Chin tuck and neck retract X10 AB                Key:  A=active, AA=active assisted, P=passive, B=bilaterally, R=right, L=left   DF=dorsiflexion, PF=plantarflexion      AFTER TREATMENT PRECAUTIONS: Alarm Activated, Bed, Bed/Chair Locked, Call light within reach, Needs within reach, RN notified, and Side rails x3    INTERDISCIPLINARY COLLABORATION:  RN/ PCT and PT/ MARIELENA    EDUCATION:      TIME IN/OUT:  Time In: 1235  Time Out: 1300  Minutes: 541 [facility].  MARIELENA Perrin

## 2023-03-26 NOTE — PROGRESS NOTES
CV Progress Note    Subjective: Incisional pain:  moderate  Appetite:  poor  Activity: Ambulating with assistance      Objective:     Vitals:  Blood pressure 134/66, pulse 86, temperature 97.7 °F (36.5 °C), temperature source Oral, resp. rate 20, height 6' (1.829 m), weight 238 lb (108 kg), SpO2 96 %. Temp (24hrs), Av.8 °F (36.6 °C), Min:97.3 °F (36.3 °C), Max:98.1 °F (36.7 °C)        Plan/Recommendations/Medical Decision Making:     Principal Problem:    S/P CABG x 4  Active Problems:    NSTEMI (non-ST elevated myocardial infarction) ([facility]    ANNMARIE (acute kidney injury) ([facility]    Acute pulmonary edema (HCC)    History of coronary artery stent placement    Cardiac arrest ([facility]    Acute respiratory failure with hypoxia (HCC)    Ventricular arrhythmia    Acute myocardial infarction, subendocardial infarction, initial episode of care ([facility]    Pleural effusion    Diabetes mellitus type 1.5 ([facility]    Essential hypertension    Hyperlipidemia  Resolved Problems:    * No resolved hospital problems. *      Continue present treatment    See orders for details. Yinka Damian MD

## 2023-03-26 NOTE — PROGRESS NOTES
DURATION: BID for duration of hospital stay or until stated goals are met, whichever comes first.    TREATMENT:   TREATMENT:   Therapeutic Activity (25 Minutes): Therapeutic activity included Scooting, Transfer Training, Ambulation on level ground, Sitting balance , Standing balance, and LE exercises to improve functional Activity tolerance, Balance, Mobility, and Strength. TREATMENT GRID:     DATE: 3/24/23 am       Ambulation        Hip Flexion X10 AB       Long Arc Quads X10 AB       Hip ab/ad        Heel Raises        Toe Raises X10 AB       Shoulder shrugs X10 AB       Chin tuck and neck retract X10 AB                Key:  A=active, AA=active assisted, P=passive, B=bilaterally, R=right, L=left   DF=dorsiflexion, PF=plantarflexion      AFTER TREATMENT PRECAUTIONS: Alarm Activated, Bed/Chair Locked, Call light within reach, Chair, Needs within reach, and RN notified    INTERDISCIPLINARY COLLABORATION:  RN/ PCT and PT/ PTA    EDUCATION:      TIME IN/OUT:  Time In: 5217  Time Out: 0820  Minutes: 100 Janessa Perrin PTA

## 2023-03-27 ENCOUNTER — APPOINTMENT (OUTPATIENT)
Dept: GENERAL RADIOLOGY | Age: 68
DRG: 215 | End: 2023-03-27
Payer: COMMERCIAL

## 2023-03-27 VITALS
TEMPERATURE: 97.5 F | SYSTOLIC BLOOD PRESSURE: 134 MMHG | DIASTOLIC BLOOD PRESSURE: 65 MMHG | HEART RATE: 78 BPM | WEIGHT: 234 LBS | OXYGEN SATURATION: 94 % | HEIGHT: 72 IN | BODY MASS INDEX: 31.69 KG/M2 | RESPIRATION RATE: 20 BRPM

## 2023-03-27 LAB
ANION GAP SERPL CALC-SCNC: 5 MMOL/L (ref 2–11)
BASOPHILS # BLD: 0 K/UL (ref 0–0.2)
BASOPHILS NFR BLD: 0 % (ref 0–2)
BUN SERPL-MCNC: 24 MG/DL (ref 8–23)
CALCIUM SERPL-MCNC: 8.7 MG/DL (ref 8.3–10.4)
CHLORIDE SERPL-SCNC: 108 MMOL/L (ref 101–110)
CO2 SERPL-SCNC: 27 MMOL/L (ref 21–32)
CREAT SERPL-MCNC: 1.2 MG/DL (ref 0.8–1.5)
DIFFERENTIAL METHOD BLD: ABNORMAL
EOSINOPHIL # BLD: 0.2 K/UL (ref 0–0.8)
EOSINOPHIL NFR BLD: 2 % (ref 0.5–7.8)
ERYTHROCYTE [DISTWIDTH] IN BLOOD BY AUTOMATED COUNT: 15.2 % (ref 11.9–14.6)
GLUCOSE BLD STRIP.AUTO-MCNC: 118 MG/DL (ref 65–100)
GLUCOSE BLD STRIP.AUTO-MCNC: 257 MG/DL (ref 65–100)
GLUCOSE SERPL-MCNC: 118 MG/DL (ref 65–100)
HCT VFR BLD AUTO: 28.8 % (ref 41.1–50.3)
HGB BLD-MCNC: 9.3 G/DL (ref 13.6–17.2)
IMM GRANULOCYTES # BLD AUTO: 0.1 K/UL (ref 0–0.5)
IMM GRANULOCYTES NFR BLD AUTO: 2 % (ref 0–5)
LYMPHOCYTES # BLD: 0.8 K/UL (ref 0.5–4.6)
LYMPHOCYTES NFR BLD: 10 % (ref 13–44)
MAGNESIUM SERPL-MCNC: 2.2 MG/DL (ref 1.8–2.4)
MCH RBC QN AUTO: 29.3 PG (ref 26.1–32.9)
MCHC RBC AUTO-ENTMCNC: 32.3 G/DL (ref 31.4–35)
MCV RBC AUTO: 90.9 FL (ref 82–102)
MONOCYTES # BLD: 0.7 K/UL (ref 0.1–1.3)
MONOCYTES NFR BLD: 8 % (ref 4–12)
NEUTS SEG # BLD: 6.4 K/UL (ref 1.7–8.2)
NEUTS SEG NFR BLD: 78 % (ref 43–78)
NRBC # BLD: 0.02 K/UL (ref 0–0.2)
PLATELET # BLD AUTO: 449 K/UL (ref 150–450)
PMV BLD AUTO: 10.1 FL (ref 9.4–12.3)
POTASSIUM SERPL-SCNC: 3.7 MMOL/L (ref 3.5–5.1)
RBC # BLD AUTO: 3.17 M/UL (ref 4.23–5.6)
SERVICE CMNT-IMP: ABNORMAL
SERVICE CMNT-IMP: ABNORMAL
SODIUM SERPL-SCNC: 140 MMOL/L (ref 133–143)
WBC # BLD AUTO: 8.3 K/UL (ref 4.3–11.1)

## 2023-03-27 PROCEDURE — 6370000000 HC RX 637 (ALT 250 FOR IP): Performed by: INTERNAL MEDICINE

## 2023-03-27 PROCEDURE — 99024 POSTOP FOLLOW-UP VISIT: CPT | Performed by: PHYSICIAN ASSISTANT

## 2023-03-27 PROCEDURE — 71046 X-RAY EXAM CHEST 2 VIEWS: CPT

## 2023-03-27 PROCEDURE — 6370000000 HC RX 637 (ALT 250 FOR IP): Performed by: NURSE PRACTITIONER

## 2023-03-27 PROCEDURE — 6370000000 HC RX 637 (ALT 250 FOR IP): Performed by: PHYSICIAN ASSISTANT

## 2023-03-27 PROCEDURE — 80048 BASIC METABOLIC PNL TOTAL CA: CPT

## 2023-03-27 PROCEDURE — 82962 GLUCOSE BLOOD TEST: CPT

## 2023-03-27 PROCEDURE — 83735 ASSAY OF MAGNESIUM: CPT

## 2023-03-27 PROCEDURE — 97110 THERAPEUTIC EXERCISES: CPT

## 2023-03-27 PROCEDURE — 85025 COMPLETE CBC W/AUTO DIFF WBC: CPT

## 2023-03-27 PROCEDURE — 2580000003 HC RX 258: Performed by: INTERNAL MEDICINE

## 2023-03-27 PROCEDURE — 6370000000 HC RX 637 (ALT 250 FOR IP): Performed by: THORACIC SURGERY (CARDIOTHORACIC VASCULAR SURGERY)

## 2023-03-27 PROCEDURE — 97530 THERAPEUTIC ACTIVITIES: CPT

## 2023-03-27 PROCEDURE — 99231 SBSQ HOSP IP/OBS SF/LOW 25: CPT | Performed by: INTERNAL MEDICINE

## 2023-03-27 RX ORDER — CLOPIDOGREL BISULFATE 75 MG/1
75 TABLET ORAL DAILY
Qty: 30 TABLET | Refills: 11 | Status: SHIPPED | OUTPATIENT
Start: 2023-03-27

## 2023-03-27 RX ORDER — ACETAMINOPHEN 325 MG/1
650 TABLET ORAL EVERY 6 HOURS PRN
Qty: 120 TABLET | Refills: 3 | COMMUNITY
Start: 2023-03-27

## 2023-03-27 RX ORDER — AMLODIPINE BESYLATE 5 MG/1
5 TABLET ORAL DAILY
Qty: 30 TABLET | Refills: 3 | Status: SHIPPED | OUTPATIENT
Start: 2023-03-28

## 2023-03-27 RX ADMIN — AMLODIPINE BESYLATE 5 MG: 5 TABLET ORAL at 08:26

## 2023-03-27 RX ADMIN — INSULIN LISPRO 8 UNITS: 100 INJECTION, SOLUTION INTRAVENOUS; SUBCUTANEOUS at 12:27

## 2023-03-27 RX ADMIN — ASPIRIN 81 MG: 81 TABLET ORAL at 08:26

## 2023-03-27 RX ADMIN — LEVOTHYROXINE SODIUM 175 MCG: 50 TABLET ORAL at 05:59

## 2023-03-27 RX ADMIN — METOPROLOL TARTRATE 25 MG: 25 TABLET, FILM COATED ORAL at 08:26

## 2023-03-27 RX ADMIN — SODIUM CHLORIDE, PRESERVATIVE FREE 10 ML: 5 INJECTION INTRAVENOUS at 08:28

## 2023-03-27 RX ADMIN — AMIODARONE HYDROCHLORIDE 200 MG: 200 TABLET ORAL at 08:26

## 2023-03-27 RX ADMIN — ACETAMINOPHEN 650 MG: 325 TABLET, FILM COATED ORAL at 12:26

## 2023-03-27 RX ADMIN — Medication 1 AMPULE: at 08:34

## 2023-03-27 RX ADMIN — INSULIN GLARGINE 26 UNITS: 100 INJECTION, SOLUTION SUBCUTANEOUS at 08:27

## 2023-03-27 RX ADMIN — FAMOTIDINE 20 MG: 20 TABLET, FILM COATED ORAL at 08:27

## 2023-03-27 RX ADMIN — SENNOSIDES AND DOCUSATE SODIUM 1 TABLET: 8.6; 5 TABLET ORAL at 08:28

## 2023-03-27 RX ADMIN — ACETAMINOPHEN 650 MG: 325 TABLET, FILM COATED ORAL at 08:26

## 2023-03-27 RX ADMIN — POTASSIUM CHLORIDE 40 MEQ: 1500 TABLET, EXTENDED RELEASE ORAL at 12:26

## 2023-03-27 RX ADMIN — ALLOPURINOL 100 MG: 100 TABLET ORAL at 08:27

## 2023-03-27 ASSESSMENT — PAIN DESCRIPTION - DESCRIPTORS
DESCRIPTORS: ACHING
DESCRIPTORS: ACHING

## 2023-03-27 ASSESSMENT — PAIN SCALES - GENERAL
PAINLEVEL_OUTOF10: 0
PAINLEVEL_OUTOF10: 2
PAINLEVEL_OUTOF10: 0
PAINLEVEL_OUTOF10: 0
PAINLEVEL_OUTOF10: 6
PAINLEVEL_OUTOF10: 0
PAINLEVEL_OUTOF10: 4

## 2023-03-27 ASSESSMENT — PAIN DESCRIPTION - LOCATION
LOCATION: CHEST
LOCATION: CHEST

## 2023-03-27 ASSESSMENT — PAIN DESCRIPTION - ORIENTATION
ORIENTATION: ANTERIOR
ORIENTATION: ANTERIOR

## 2023-03-27 NOTE — CARE COORDINATION
Pt with discharge  orders this day. This CM met with pt and spouse at bedside this day to discuss discharge  planning. Pt to return home with spouse. We reviewed the role and recommendation of home health at discharge - pt is agreeable to referral.  Reviewed agencies - referral made to [facility] this day. No additional CM needs at discharge.

## 2023-03-27 NOTE — PROGRESS NOTES
Discharge instructions, follow up appointments and prescriptions reviewed with patient and family. Both verbalize understanding. All personal belongings taken with patient. Family member will drive patient home. Patient escorted to discharge area via wheelchair. Patient is stable at discharge. PICC removed and CT sutures.

## 2023-03-27 NOTE — DIABETES MGMT
Patient admitted with s/p CABG. Blood glucose ranged 102-281 yesterday with patient receiving Lantus 52 units and Humalog 8 units. Blood glucose this morning was 118. Creatinine 1.20. GFR > 60. Reviewed patient current regimen: Lantus 26 units BID and Humalog correctional insulin high dose scale. Patient would likely benefit from initiation of low dose prandial insulin to help offset hyperglycemia during the day related to caloric intake. Patient would also likely benefit from a decrease in correctional insulin scale to medium dose to reduce the risk of overcorrection of hyperglycemia and subsequent hypoglycemia. Provider updated via [employer] regarding recommendations and glycemic control. Update 1427:  Patient and patient wife seen for follow up diabetes education. Described the effects of poor glycemic control and the development of long-term complications such as renal, eye, nerve, and cardiovascular disease. Discussed target goals for blood glucose and A1C. Patient states has insulin pens, a glucometer and all needed supplies at home. Discussed checking blood glucose AC&HS. Discussed importance of glucose control and wound healing. Encouraged patient to continue to work on lifestyle modifications and to follow up with primary care provider (Sonia Dillon, Endocrinology). Patient verbalized understanding and voices no further questions regarding diabetes management at this time.

## 2023-03-27 NOTE — PROGRESS NOTES
3/27/2023 2:36 PM    Admit Date: 3/15/2023    Admit Diagnosis: STEMI (ST elevation myocardial infarction) ([facility] [I21.3]  Acute myocardial infarction, subendocardial infarction, initial episode of care ([facility] [I21.4]      Subjective:   No cp or sob      Objective:    /65   Pulse 78   Temp 97.5 °F (36.4 °C) (Oral)   Resp 20   Ht 6' (1.829 m)   Wt 234 lb (106.1 kg)   SpO2 94%   BMI 31.74 kg/m²     Physical Exam:  Selin Beams, Well Nourished, No Acute Distress, Alert & Oriented x 3, appropriate mood. Neck- supple, no JVD  CV- regular rate and rhythm no MRG  Lung- clear bilaterally  Abd- soft, nontender, nondistended  Ext- no edema bilaterally. Skin- warm and dry        Data Review:   Recent Labs     03/27/23  0637      K 3.7   BUN 24*   WBC 8.3   HGB 9.3*   HCT 28.8*          Assessment/Plan:     Active Hospital Problems    ANNMARIE (acute kidney injury) ([facility]      Acute pulmonary edema (HCC)      NSTEMI (non-ST elevated myocardial infarction) ([facility]- s/p cabg- Stable. Continue current medical therapy.         S/P CABG x 4      Pleural effusion      Acute myocardial infarction, subendocardial infarction, initial episode of care [facility])      History of coronary artery stent placement      Cardiac arrest ([facility]      Acute respiratory failure with hypoxia (HCC)      Ventricular arrhythmia      Essential hypertension      Hyperlipidemia      Diabetes mellitus type 1.5 ([facility]

## 2023-03-27 NOTE — DISCHARGE SUMMARY
Range    POC Glucose 118 (H) 65 - 100 mg/dL    Performed by: Sidney Lea    Basic Metabolic Panel    Collection Time: 03/27/23  6:37 AM   Result Value Ref Range    Sodium 140 133 - 143 mmol/L    Potassium 3.7 3.5 - 5.1 mmol/L    Chloride 108 101 - 110 mmol/L    CO2 27 21 - 32 mmol/L    Anion Gap 5 2 - 11 mmol/L    Glucose 118 (H) 65 - 100 mg/dL    BUN 24 (H) 8 - 23 MG/DL    Creatinine 1.20 0.8 - 1.5 MG/DL    Est, Glom Filt Rate >60 >60 ml/min/1.73m2    Calcium 8.7 8.3 - 10.4 MG/DL   CBC with Auto Differential    Collection Time: 03/27/23  6:37 AM   Result Value Ref Range    WBC 8.3 4.3 - 11.1 K/uL    RBC 3.17 (L) 4.23 - 5.6 M/uL    Hemoglobin 9.3 (L) 13.6 - 17.2 g/dL    Hematocrit 28.8 (L) 41.1 - 50.3 %    MCV 90.9 82 - 102 FL    MCH 29.3 26.1 - 32.9 PG    MCHC 32.3 31.4 - 35.0 g/dL    RDW 15.2 (H) 11.9 - 14.6 %    Platelets 236 786 - 153 K/uL    MPV 10.1 9.4 - 12.3 FL    nRBC 0.02 0.0 - 0.2 K/uL    Differential Type AUTOMATED      Seg Neutrophils 78 43 - 78 %    Lymphocytes 10 (L) 13 - 44 %    Monocytes 8 4.0 - 12.0 %    Eosinophils % 2 0.5 - 7.8 %    Basophils 0 0.0 - 2.0 %    Immature Granulocytes 2 0.0 - 5.0 %    Segs Absolute 6.4 1.7 - 8.2 K/UL    Absolute Lymph # 0.8 0.5 - 4.6 K/UL    Absolute Mono # 0.7 0.1 - 1.3 K/UL    Absolute Eos # 0.2 0.0 - 0.8 K/UL    Basophils Absolute 0.0 0.0 - 0.2 K/UL    Absolute Immature Granulocyte 0.1 0.0 - 0.5 K/UL   Magnesium    Collection Time: 03/27/23  6:37 AM   Result Value Ref Range    Magnesium 2.2 1.8 - 2.4 mg/dL   POCT Glucose    Collection Time: 03/27/23 11:17 AM   Result Value Ref Range    POC Glucose 257 (H) 65 - 100 mg/dL    Performed by:  Abel          Patient Instructions:   Current Discharge Medication List        START taking these medications    Details   acetaminophen (TYLENOL) 325 MG tablet Take 2 tablets by mouth every 6 hours as needed for Pain  Qty: 120 tablet, Refills: 3      metoprolol tartrate (LOPRESSOR) 25 MG tablet Take 1 tablet by mouth 2 times daily  Qty: 60 tablet, Refills: 3      amLODIPine (NORVASC) 5 MG tablet Take 1 tablet by mouth daily  Qty: 30 tablet, Refills: 3      clopidogrel (PLAVIX) 75 MG tablet Take 1 tablet by mouth daily  Qty: 30 tablet, Refills: 11           CONTINUE these medications which have CHANGED    Details   Insulin Degludec 100 UNIT/ML SOPN Inject 52 Units into the skin nightly  Qty: 1 Adjustable Dose Pre-filled Pen Syringe, Refills: 0           CONTINUE these medications which have NOT CHANGED    Details   aspirin 81 MG chewable tablet Take 81 mg by mouth daily      atorvastatin (LIPITOR) 80 MG tablet Take 80 mg by mouth daily      allopurinol (ZYLOPRIM) 100 MG tablet Take 1 tablet by mouth in the morning. Qty: 90 tablet, Refills: 3      cyanocobalamin 1000 MCG tablet Take 1,000 mcg by mouth daily      dapagliflozin (FARXIGA) 10 MG tablet Take 10 mg by mouth daily      fenofibrate (TRIGLIDE) 160 MG tablet Take 160 mg by mouth daily      insulin aspart (NOVOLOG) 100 UNIT/ML injection vial Inject into the skin as needed Patient take 1:9 carb ratio plus 3 units/50 > 150      levothyroxine (SYNTHROID) 175 MCG tablet Take 175 mcg by mouth every morning (before breakfast)      nitroGLYCERIN (NITROSTAT) 0.4 MG SL tablet Use as directed for chest pain      omeprazole (PRILOSEC) 20 MG delayed release capsule Take 20 mg by mouth daily      Semaglutide, 1 MG/DOSE, (OZEMPIC, 1 MG/DOSE,) 2 MG/1.5ML SOPN Inject 1 mg into the skin every 7 days           STOP taking these medications       triamterene-hydroCHLOROthiazide (MAXZIDE-25) 37.5-25 MG per tablet Comments:   Reason for Stopping:         Liraglutide (VICTOZA) 18 MG/3ML SOPN SC injection Comments:   Reason for Stopping:         lisinopril (PRINIVIL;ZESTRIL) 20 MG tablet Comments:   Reason for Stopping:         metFORMIN (GLUCOPHAGE) 1000 MG tablet Comments:   Reason for Stopping:                 Signed:  Phill Patrick PA-C  3/27/2023  1:15 PM

## 2023-03-27 NOTE — PROGRESS NOTES
ACUTE PHYSICAL THERAPY GOALS:   (Developed with and agreed upon by patient and/or caregiver.)   Mr. Opal Sheffield will perform supine to sit and sit to supine with bed flat and no rails independently in 7 days. Mr. Opal Sheffield will perform sit to stand and bed to chair independently in 7 days. Mr. Opal Sheffield will perform gait 200 ft with least restrictive device independently in 7 days. Mr. Opal Sheffield will perform therex x 25 reps in sitting in 7 days. PHYSICAL THERAPY: Daily Note AM   (Link to Caseload Tracking: PT Visit Days : 5  Time In/Out PT Charge Capture  Rehab Caseload Tracker  Orders    Zulma Alex is a 74 y.o. male   PRIMARY DIAGNOSIS: S/P CABG x 4  STEMI (ST elevation myocardial infarction) ([facility] [address] [I21.3]  Acute myocardial infarction, subendocardial infarction, initial episode of care ([facility] [address] [I21.4]  Procedure(s) (LRB):  EXPLANT OF IMPELLA (N/A)  6 Days Post-Op  Inpatient: Payor: Hank Echols 150 / Plan: [employer] [place] / Product Type: *No Product type* /     ASSESSMENT:     REHAB RECOMMENDATIONS:   Recommendation to date pending progress:  Setting:  Home Health Therapy    Equipment:    To Be Determined     ASSESSMENT:  Mr. Opal Sheffield was sitting up in recliner chair upon contact and agreeable to PT. Wife at bedside. Patient seems somewhat disoriented with slightly impulsive behavior noted but responds well to cueing/redirection. Patient performed transfer to standing with min assist and cues for technique/hand placement. Once standing patient ambulated 200' with rolling walker, min assist needed for balance at times due to unsteady gait,  and cues for sequencing/posture/pursed lipped breathing. Patient returned to recliner chair and after a short rest break the patient performed therapeutic exercises. Tolerated well. Good session and  Overall steady progress. Will continue PT efforts.   HHPT at d/c.     SUBJECTIVE:   Mr. Opal Sheffield states, \"Good morning\"     Social/Functional Lives With: Spouse  Type of Home: [facility] goals are met, whichever comes first.    TREATMENT:   TREATMENT:   Therapeutic Activity (15 Minutes): Therapeutic activity included Scooting, Transfer Training, Ambulation on level ground, Sitting balance , and Standing balance to improve functional Activity tolerance, Balance, Coordination, Mobility, and Strength. Therapeutic Exercise (13 Minutes): Therapeutic exercises noted below to improve functional activity tolerance, AROM, strength, and mobility.      TREATMENT GRID:     Date:  3/27/23 Date:   Date:     ACTIVITY/EXERCISE AM PM AM PM AM PM   LAQ 15        Shoulder shrugs 15        Gluteal sets 15        Marching  15        Ankle pumps  15        Abduction 15                       AFTER TREATMENT PRECAUTIONS: Alarm Activated, Bed/Chair Locked, Call light within reach, Chair, Needs within reach, RN notified, and Visitors at bedside    INTERDISCIPLINARY COLLABORATION:  RN/ PCT and PT/ PTA    EDUCATION:  Review POC and importance of mobility in the recovery process    TIME IN/OUT:  Time In: 9439  Time Out: [address] [facility] [address]  Minutes: 29    Gladys Barrera-Jayde, PTA

## 2023-03-27 NOTE — PROGRESS NOTES
ACUTE PHYSICAL THERAPY GOALS:   (Developed with and agreed upon by patient and/or caregiver.)   Mr. Cole Moritz will perform supine to sit and sit to supine with bed flat and no rails independently in 7 days. Mr. Cole Moritz will perform sit to stand and bed to chair independently in 7 days. Mr. Cole Moritz will perform gait 200 ft with least restrictive device independently in 7 days. Mr. Cole Moritz will perform therex x 25 reps in sitting in 7 days. PHYSICAL THERAPY: Daily Note PM   (Link to Caseload Tracking: PT Visit Days : 5  Time In/Out PT Charge Capture  Rehab Caseload Tracker  Orders    Liat Doran is a 74 y.o. male   PRIMARY DIAGNOSIS: S/P CABG x 4  STEMI (ST elevation myocardial infarction) ([facility] [address] [I21.3]  Acute myocardial infarction, subendocardial infarction, initial episode of care ([facility] [address] [I21.4]  Procedure(s) (LRB):  EXPLANT OF IMPELLA (N/A)  6 Days Post-Op  Inpatient: Payor: Diaz Mediate / Plan: LAST SC / Product Type: *No Product type* /     ASSESSMENT:     REHAB RECOMMENDATIONS:   Recommendation to date pending progress:  Setting:  Home Health Therapy    Equipment:    Rolling Walker     ASSESSMENT:  Mr. Cole Moritz was sitting up in recliner chair upon contact and agreeable to PT. Wife at bedside. Patient seems somewhat disoriented with slightly impulsive behavior noted but responds well to cueing/redirection. Patient performed transfer to standing with min assist and cues for technique/hand placement. Once standing patient ambulated 200' with rolling walker, min assist needed for balance at times due to unsteady gait,  and cues for sequencing/posture/pursed lipped breathing. Patient returned to recliner chair where he participated great in LE exercises. Overall steady progress. Will continue PT efforts. PM note:  Patient is sitting edge of bed with his wife at bedside. He is agreeable to therapy. D/C orders placed in the chart.   PM treatment consisted of gait training to determine best/safest

## 2023-03-27 NOTE — ADT AUTH CERT
Entered   Completed 3/27/2023 1027       Created By   Joe Kamara      Criteria Review      Care Day: 11 Care Date: 3/25/2023 Level of Care: Inpatient Floor    Guideline Day 2    Level Of Care    (X) Intermediate care or telemetry    3/27/2023 10:27 AM EDT by Josue Dick      IP/ medical    Clinical Status    (X) * Hemodynamic stability    3/27/2023 10:27 AM EDT by Josue Dick      VS: 98 (36.7) 19   92   158/71   95% NC  5-7L    (X) * Chest pain, dyspnea, or anginal equivalent absent    3/27/2023 10:27 AM EDT by Josue Dick      +  SOB w/ exertion    Activity    (X) Activity as tolerated    3/27/2023 10:27 AM EDT by Josue Dick      with PT assist    (X) Possibly begin ambulation    Routes    (X) * Oral hydration    (X) * Oral medications    3/27/2023 10:27 AM EDT by Josue Dick      Amiodarone 200mg BID PO, Norvasc 5mg QD PO,  Lasix 40mg BID IV, Klor-con M 10mEq QD 20mEq BID PRN x1 PO, Ultram 100mg Q6 PRN PO x1    (X) Oral diet as tolerated    3/27/2023 10:27 AM EDT by Josue Dick      Adult w/ oral supplements x3    Interventions    ( ) * Oxygen absent    3/27/2023 10:27 AM EDT by Josue Dick      on O2 NC support    (X) PT/PTT and platelet count    Medications    (X) * IV nitroglycerin absent    (X) Antiplatelet agents (eg, aspirin, clopidogrel, ticagrelor)    3/27/2023 10:27 AM EDT by Josue Dick      Aspirin    (X) Beta-blocker    3/27/2023 10:27 AM EDT by Josue Dick      Lopressor 25mg BID PO    (X) Statin    3/27/2023 10:27 AM EDT by Josue Dick      Lipitor       Definitions for Care Day 11    Hemodynamic stability    (X) Hemodynamic stability, as indicated by  1 or more  of the following :       (X) Hemodynamic abnormalities at baseline or acceptable for next level of care       * Milestone   Additional Notes   DATE: 03/2      PERTINENT UPDATES:   still on O2 at 6L on lasix but still positive balance and edematous      ABNL/PERTINENT LABS/RADIOLOGY/DIAGNOSTIC

## 2023-03-27 NOTE — WOUND CARE
Patient seen for reported redness to sacrum. Noted sacrum and coccyx area clear. There was minor pink area inside gluteal cleft fold from moisture. Continue zinc barrier cream to protect. Discussed with patient, nurse and pct. Will sign off.

## 2023-03-28 ENCOUNTER — TELEPHONE (OUTPATIENT)
Dept: CARDIOLOGY CLINIC | Age: 68
End: 2023-03-28

## 2023-03-28 RX ORDER — NITROGLYCERIN 0.4 MG/1
TABLET SUBLINGUAL
Qty: 25 TABLET | Refills: 3 | Status: SHIPPED | OUTPATIENT
Start: 2023-03-28

## 2023-03-28 NOTE — TELEPHONE ENCOUNTER
Transitions of care call  Spoke with patient, doing well. Asked to have NTG sent to pharmacy as he does not have any left. Prescription sent to pharmacy. Requested Prescriptions     Signed Prescriptions Disp Refills    nitroGLYCERIN (NITROSTAT) 0.4 MG SL tablet 25 tablet 3     Sig: Use as directed for chest pain     Authorizing Provider: Yuliet Barajas     Ordering User: Alvin Riley     The patient is being discharged home with home health services in stable condition on a low saturated fat, low cholesterol and low salt diet. The patient is instructed to advance activities as tolerated to the limit of fatigue or shortness of breath. The patient is instructed to avoid all heavy lifting or activities that strain the chest or upper arm muscles. Strenuous activity should be avoided. The patient is instructed to watch for signs of infection which include: increasing area of redness, fever or purulent drainage at the incision site. The patient is instructed to call the office or return to the ER for immediate evaluation for any shortness of breath or chest pain not relieved by NTG. discharge instructions reviewed. Patient voiced understanding.  All questions answered wife will bring to appointment//diana

## 2023-03-29 DIAGNOSIS — Z95.1 S/P CABG X 4: Primary | ICD-10-CM

## 2023-04-05 NOTE — OP NOTE
[facility]  OPERATIVE REPORT    Name:  Rigoberto Swan  MR#:  [id number]  :  1955  ACCOUNT #:  [de-identified]  DATE OF SERVICE:  2023    PREOPERATIVE DIAGNOSES:  1. ST-elevation myocardial infarction. 2.  Severe multivessel atherosclerotic coronary artery disease. 3.  Ischemic cardiomyopathy. POSTOPERATIVE DIAGNOSES:  1. ST-elevation myocardial infarction. 2.  Severe multivessel atherosclerotic coronary artery disease. 3.  Ischemic cardiomyopathy. PROCEDURE PERFORMED:  1. Endoscopic vein harvest from left leg. 2.  Coronary artery bypass grafting x4. Grafts consisting:  A. Left internal mammary artery to the LAD. B.  Reverse saphenous vein graft to OM1. C.  Reverse saphenous vein graft to OM2. D.  Reverse saphenous vein graft to OM3.  3.  Impella 5.5 placement directly through the aorta. 4.  Clipping of left atrial appendage with a 40-mm AtriClip. OPERATIVE FINDINGS:  Saphenous vein 2-3 mm. LIMA 3 mm. Aorta is soft, no palpable plaque. LAD is 1.3 mm with severe distal disease. OM1 is 1.3 mm, severe distal disease. OM2 is 1.4 mm, severe distal disease. OM3 is 1.4 mm, severe distal disease. SURGEON:  Chaitanya Lorenzo. Marylu Garcia MD    ASSISTANT:    ANESTHESIA:  Endotracheal with MONICA. COMPLICATIONS:  None. SPECIMENS REMOVED:  ***. IMPLANTS:  ***. ESTIMATED BLOOD LOSS:  Minimal.    INDICATIONS:  The patient is a 22-year-old gentleman who presented on emergency room as a STEMI. He had presented to the ER at [facility] with STEMI, and subsequently transferred [facility] for emergent heart cath. This was previously initiated by having initial chest pain on the previous Saturday evening, felt poorly, continued to feel poorly throughout the week, originally called his doctor at [facility] and was offered an appointment but also referred to the emergency room.   Upon arrival to the emergency room, he ruled in for a STEMI with markedly elevated troponin,

## 2023-04-10 PROBLEM — I49.9 VENTRICULAR ARRHYTHMIA: Status: RESOLVED | Noted: 2023-03-16 | Resolved: 2023-04-10

## 2023-04-10 PROBLEM — I46.9 CARDIAC ARREST (HCC): Status: RESOLVED | Noted: 2023-03-16 | Resolved: 2023-04-10

## 2023-04-10 PROBLEM — I21.4 NSTEMI (NON-ST ELEVATED MYOCARDIAL INFARCTION) (HCC): Status: RESOLVED | Noted: 2023-03-15 | Resolved: 2023-04-10

## 2023-04-10 PROBLEM — I21.4 ACUTE MYOCARDIAL INFARCTION, SUBENDOCARDIAL INFARCTION, INITIAL EPISODE OF CARE (HCC): Status: RESOLVED | Noted: 2023-03-17 | Resolved: 2023-04-10

## 2023-04-18 SDOH — ECONOMIC STABILITY: FOOD INSECURITY: WITHIN THE PAST 12 MONTHS, THE FOOD YOU BOUGHT JUST DIDN'T LAST AND YOU DIDN'T HAVE MONEY TO GET MORE.: NEVER TRUE

## 2023-04-18 SDOH — ECONOMIC STABILITY: INCOME INSECURITY: HOW HARD IS IT FOR YOU TO PAY FOR THE VERY BASICS LIKE FOOD, HOUSING, MEDICAL CARE, AND HEATING?: NOT HARD AT ALL

## 2023-04-18 SDOH — ECONOMIC STABILITY: TRANSPORTATION INSECURITY
IN THE PAST 12 MONTHS, HAS LACK OF TRANSPORTATION KEPT YOU FROM MEETINGS, WORK, OR FROM GETTING THINGS NEEDED FOR DAILY LIVING?: NO

## 2023-04-18 SDOH — ECONOMIC STABILITY: HOUSING INSECURITY
IN THE LAST 12 MONTHS, WAS THERE A TIME WHEN YOU DID NOT HAVE A STEADY PLACE TO SLEEP OR SLEPT IN A SHELTER (INCLUDING NOW)?: NO

## 2023-04-18 SDOH — ECONOMIC STABILITY: FOOD INSECURITY: WITHIN THE PAST 12 MONTHS, YOU WORRIED THAT YOUR FOOD WOULD RUN OUT BEFORE YOU GOT MONEY TO BUY MORE.: NEVER TRUE

## 2023-04-19 ENCOUNTER — OFFICE VISIT (OUTPATIENT)
Dept: CARDIOTHORACIC SURGERY | Age: 68
End: 2023-04-19

## 2023-04-19 VITALS
SYSTOLIC BLOOD PRESSURE: 126 MMHG | BODY MASS INDEX: 33.1 KG/M2 | WEIGHT: 244.4 LBS | HEIGHT: 72 IN | HEART RATE: 87 BPM | DIASTOLIC BLOOD PRESSURE: 79 MMHG

## 2023-04-19 DIAGNOSIS — Z95.1 S/P CABG X 4: Primary | ICD-10-CM

## 2023-04-19 PROCEDURE — 99024 POSTOP FOLLOW-UP VISIT: CPT | Performed by: PHYSICIAN ASSISTANT

## 2023-04-19 NOTE — PROGRESS NOTES
[address] [facility]  Manpreet Vora. MD Unruly Ordoñez. Marco Antonio Ricci MD          David Anderson       [id number]  4/19/2023 1955      David Anderson is seen today for follow-up status post   CABG X 4 with 5.5 Impella insertion on 3/17. He had VT arrest X 2 pre-op. He was gradually weaned off of pressor support. Renal function improved. He was diuresed with lasix. Impella support was weaned. The Impella was removed on 3/21. He was extubated the morning of 3/23 to [facility]. He was confused at times. He progressed with PT. He was transferred to  stepdown. Renal function improved. He was weaned off of O2. He remained in sinus rhythm. He had severe insomnia and was unable to sleep in the hospital. He was discharged home on 3/27/23. he is active and ambulatory. There is no fever or shortness of breath. Appetite is good. Pain has improved. He has some soreness in his chest and around to his back. Pt is sleeping well. EXAM:  Vitals:  Blood pressure 126/79, pulse 87, height 6' (1.829 m), weight 244 lb 6.4 oz (110.9 kg). Lungs:  Clear to auscultation bilaterally. Heart: Regular rate and rhythm without murmurs. Incision: Sternum stable. Incision healing well. Leg incisions healing well. IMPRESSION and PLAN:  DC from CT surgery. Pt may drive. Pt is planning to begin cardiac rehab. Sternal precautions: Pt advised to avoid any heavy lifting for another 4 weeks but can increase activity as tolerated. Pt has cardiology follow-up with Dr. Carnella Olszewski. No need to schedule follow-up at this point but the patient may call or return anytime if issues or questions arise. [facility].  TOMI Patrick  4/19/2023 2:35 PM

## 2023-04-20 ENCOUNTER — HOSPITAL ENCOUNTER (OUTPATIENT)
Dept: CARDIAC REHAB | Age: 68
Setting detail: RECURRING SERIES
Discharge: HOME | End: 2023-04-23
Payer: COMMERCIAL

## 2023-04-20 ASSESSMENT — PATIENT HEALTH QUESTIONNAIRE - PHQ9
3. TROUBLE FALLING OR STAYING ASLEEP: 3
SUM OF ALL RESPONSES TO PHQ QUESTIONS 1-9: 12
7. TROUBLE CONCENTRATING ON THINGS, SUCH AS READING THE NEWSPAPER OR WATCHING TELEVISION: 0
10. IF YOU CHECKED OFF ANY PROBLEMS, HOW DIFFICULT HAVE THESE PROBLEMS MADE IT FOR YOU TO DO YOUR WORK, TAKE CARE OF THINGS AT HOME, OR GET ALONG WITH OTHER PEOPLE: 1
9. THOUGHTS THAT YOU WOULD BE BETTER OFF DEAD, OR OF HURTING YOURSELF: 0
4. FEELING TIRED OR HAVING LITTLE ENERGY: 3
1. LITTLE INTEREST OR PLEASURE IN DOING THINGS: 2
2. FEELING DOWN, DEPRESSED OR HOPELESS: 1
8. MOVING OR SPEAKING SO SLOWLY THAT OTHER PEOPLE COULD HAVE NOTICED. OR THE OPPOSITE, BEING SO FIGETY OR RESTLESS THAT YOU HAVE BEEN MOVING AROUND A LOT MORE THAN USUAL: 0
SUM OF ALL RESPONSES TO PHQ QUESTIONS 1-9: 12
6. FEELING BAD ABOUT YOURSELF - OR THAT YOU ARE A FAILURE OR HAVE LET YOURSELF OR YOUR FAMILY DOWN: 0
SUM OF ALL RESPONSES TO PHQ QUESTIONS 1-9: 12
5. POOR APPETITE OR OVEREATING: 3
SUM OF ALL RESPONSES TO PHQ9 QUESTIONS 1 & 2: 3
SUM OF ALL RESPONSES TO PHQ QUESTIONS 1-9: 12

## 2023-04-20 ASSESSMENT — LIFESTYLE VARIABLES
ALCOHOL_TYPE: LIQUOR
SMOKELESS_TOBACCO: NO
ALCOHOL_USE: WEEKLY

## 2023-04-20 ASSESSMENT — EJECTION FRACTION: EF_VALUE: 50

## 2023-04-21 ENCOUNTER — OFFICE VISIT (OUTPATIENT)
Dept: FAMILY MEDICINE CLINIC | Facility: CLINIC | Age: 68
End: 2023-04-21

## 2023-04-21 VITALS
DIASTOLIC BLOOD PRESSURE: 68 MMHG | BODY MASS INDEX: 29.8 KG/M2 | SYSTOLIC BLOOD PRESSURE: 138 MMHG | WEIGHT: 220 LBS | HEIGHT: 72 IN | OXYGEN SATURATION: 98 % | HEART RATE: 86 BPM | TEMPERATURE: 97.8 F

## 2023-04-21 DIAGNOSIS — E03.9 ACQUIRED HYPOTHYROIDISM: ICD-10-CM

## 2023-04-21 DIAGNOSIS — I25.10 CORONARY ARTERY DISEASE INVOLVING NATIVE CORONARY ARTERY OF NATIVE HEART WITHOUT ANGINA PECTORIS: ICD-10-CM

## 2023-04-21 DIAGNOSIS — D64.9 ANEMIA, UNSPECIFIED TYPE: ICD-10-CM

## 2023-04-21 DIAGNOSIS — Z95.1 S/P CABG X 4: ICD-10-CM

## 2023-04-21 DIAGNOSIS — E13.9 DIABETES MELLITUS TYPE 1.5 (HCC): ICD-10-CM

## 2023-04-21 DIAGNOSIS — Z95.1 HISTORY OF CORONARY ARTERY BYPASS GRAFT: Primary | ICD-10-CM

## 2023-04-21 DIAGNOSIS — I10 ESSENTIAL HYPERTENSION: ICD-10-CM

## 2023-04-21 LAB
BASOPHILS # BLD: 0 K/UL (ref 0–0.2)
BASOPHILS NFR BLD: 1 % (ref 0–2)
DIFFERENTIAL METHOD BLD: ABNORMAL
EOSINOPHIL # BLD: 0.1 K/UL (ref 0–0.8)
EOSINOPHIL NFR BLD: 3 % (ref 0.5–7.8)
ERYTHROCYTE [DISTWIDTH] IN BLOOD BY AUTOMATED COUNT: 15.9 % (ref 11.9–14.6)
HCT VFR BLD AUTO: 36 % (ref 41.1–50.3)
HGB BLD-MCNC: 11.2 G/DL (ref 13.6–17.2)
IMM GRANULOCYTES # BLD AUTO: 0 K/UL (ref 0–0.5)
IMM GRANULOCYTES NFR BLD AUTO: 0 % (ref 0–5)
LYMPHOCYTES # BLD: 0.9 K/UL (ref 0.5–4.6)
LYMPHOCYTES NFR BLD: 25 % (ref 13–44)
MCH RBC QN AUTO: 29.6 PG (ref 26.1–32.9)
MCHC RBC AUTO-ENTMCNC: 31.1 G/DL (ref 31.4–35)
MCV RBC AUTO: 95 FL (ref 82–102)
MONOCYTES # BLD: 0.4 K/UL (ref 0.1–1.3)
MONOCYTES NFR BLD: 11 % (ref 4–12)
NEUTS SEG # BLD: 2.1 K/UL (ref 1.7–8.2)
NEUTS SEG NFR BLD: 60 % (ref 43–78)
NRBC # BLD: 0 K/UL (ref 0–0.2)
PLATELET # BLD AUTO: 262 K/UL (ref 150–450)
PMV BLD AUTO: 10.5 FL (ref 9.4–12.3)
RBC # BLD AUTO: 3.79 M/UL (ref 4.23–5.6)
WBC # BLD AUTO: 3.4 K/UL (ref 4.3–11.1)

## 2023-04-21 RX ORDER — CLOPIDOGREL BISULFATE 75 MG/1
75 TABLET ORAL DAILY
Qty: 90 TABLET | Refills: 3 | Status: SHIPPED | OUTPATIENT
Start: 2023-04-21

## 2023-04-21 RX ORDER — AMLODIPINE BESYLATE 5 MG/1
5 TABLET ORAL DAILY
Qty: 90 TABLET | Refills: 3 | Status: SHIPPED | OUTPATIENT
Start: 2023-04-21

## 2023-04-21 ASSESSMENT — ENCOUNTER SYMPTOMS
WHEEZING: 0
SHORTNESS OF BREATH: 0
SINUS PRESSURE: 0
EYE DISCHARGE: 0
CHEST TIGHTNESS: 0
ABDOMINAL PAIN: 0
DIARRHEA: 0
CONSTIPATION: 0
BACK PAIN: 0
NAUSEA: 0
BLOOD IN STOOL: 0

## 2023-04-21 ASSESSMENT — PATIENT HEALTH QUESTIONNAIRE - PHQ9
SUM OF ALL RESPONSES TO PHQ QUESTIONS 1-9: 2
SUM OF ALL RESPONSES TO PHQ QUESTIONS 1-9: 2
SUM OF ALL RESPONSES TO PHQ9 QUESTIONS 1 & 2: 2
2. FEELING DOWN, DEPRESSED OR HOPELESS: 1
SUM OF ALL RESPONSES TO PHQ QUESTIONS 1-9: 2
1. LITTLE INTEREST OR PLEASURE IN DOING THINGS: 1
SUM OF ALL RESPONSES TO PHQ QUESTIONS 1-9: 2

## 2023-04-21 NOTE — PROGRESS NOTES
nightly 1 Adjustable Dose Pre-filled Pen Syringe 0    aspirin 81 MG chewable tablet Take 1 tablet by mouth daily      atorvastatin (LIPITOR) 80 MG tablet Take 1 tablet by mouth daily      allopurinol (ZYLOPRIM) 100 MG tablet Take 1 tablet by mouth in the morning. 90 tablet 3    fenofibrate (TRIGLIDE) 160 MG tablet Take 1 tablet by mouth daily      insulin aspart (NOVOLOG) 100 UNIT/ML injection vial Inject into the skin as needed Patient take 1:9 carb ratio plus 3 units/50 > 150      levothyroxine (SYNTHROID) 175 MCG tablet Take 1 tablet by mouth every morning (before breakfast)      omeprazole (PRILOSEC) 20 MG delayed release capsule Take 1 capsule by mouth daily      Semaglutide, 1 MG/DOSE, (OZEMPIC, 1 MG/DOSE,) 2 MG/1.5ML SOPN Inject 1 mg into the skin every 7 days       No current facility-administered medications for this visit. Past Medical History  Past Medical History:   Diagnosis Date    CAD (coronary artery disease)     Diabetes ([facility] [address]     GERD (gastroesophageal reflux disease)     Gout     Hypercholesterolemia     Hypertension     Thyroid disease        Surgical History  Past Surgical History:   Procedure Laterality Date    CARDIAC PROCEDURE N/A 3/15/2023    LEFT HEART CATH / CORONARY ANGIOGRAPHY performed by Jose Bhatt MD at [address] [place] [address] CATH LAB    CARDIAC PROCEDURE N/A 3/17/2023    Ventricular assist device (VAD) insertion performed by Jennyfer Rogers MD at [place] [address] N/A 3/21/2023    EXPLANT OF IMPELLA performed by Orlin Ochoa MD at [place] [address] GRAFT N/A 3/17/2023    CAB[facility] [place]; VEIN HARVEST ENDOSCOPIC, GREATER SAPHENOUS VEIN LEFT; LEFT ATRIAL APPENDAGE CLIPPING; [place] INSERTION performed by Orlin Ochoa MD at [address] [place] [address]    M. I.     TRANSESOPHAGEAL ECHOCARDIOGRAM N/A 3/17/2023    TRANSESOPHAGEAL ECHOCARDIOGRAM performed by

## 2023-04-26 ENCOUNTER — HOSPITAL ENCOUNTER (OUTPATIENT)
Dept: CARDIAC REHAB | Age: 68
Setting detail: RECURRING SERIES
Discharge: HOME | End: 2023-04-29
Payer: COMMERCIAL

## 2023-04-26 VITALS — OXYGEN SATURATION: 98 % | WEIGHT: 222 LBS | HEIGHT: 72 IN | BODY MASS INDEX: 30.07 KG/M2

## 2023-04-26 PROCEDURE — 93798 PHYS/QHP OP CAR RHAB W/ECG: CPT

## 2023-04-26 ASSESSMENT — EXERCISE STRESS TEST
PEAK_RPE: 11
PEAK_BP: 116/60
PEAK_BP: 116/60
PEAK_METS: 3.1
PEAK_HR: 113
PEAK_BP: 116/60
PEAK_HR: 113

## 2023-04-26 ASSESSMENT — LIFESTYLE VARIABLES
ALCOHOL_TYPE: LIQUOR
ALCOHOL_USE: WEEKLY
SMOKELESS_TOBACCO: NO

## 2023-04-26 ASSESSMENT — EJECTION FRACTION
EF_VALUE: 50
EF_VALUE: 50

## 2023-04-28 ENCOUNTER — HOSPITAL ENCOUNTER (OUTPATIENT)
Dept: CARDIAC REHAB | Age: 68
Setting detail: RECURRING SERIES
Discharge: HOME | End: 2023-05-01
Payer: COMMERCIAL

## 2023-04-28 PROCEDURE — 93798 PHYS/QHP OP CAR RHAB W/ECG: CPT

## 2023-04-28 ASSESSMENT — EXERCISE STRESS TEST
PEAK_BP: 130/72
PEAK_METS: 3
PEAK_HR: 110

## 2023-05-01 ENCOUNTER — HOSPITAL ENCOUNTER (OUTPATIENT)
Dept: CARDIAC REHAB | Age: 68
Setting detail: RECURRING SERIES
Discharge: HOME | End: 2023-05-04
Payer: COMMERCIAL

## 2023-05-01 PROCEDURE — 93798 PHYS/QHP OP CAR RHAB W/ECG: CPT

## 2023-05-01 ASSESSMENT — EXERCISE STRESS TEST
PEAK_HR: 117
PEAK_BP: 142/60
PEAK_METS: 3

## 2023-05-03 ENCOUNTER — HOSPITAL ENCOUNTER (OUTPATIENT)
Dept: CARDIAC REHAB | Age: 68
Setting detail: RECURRING SERIES
Discharge: HOME | End: 2023-05-06
Payer: COMMERCIAL

## 2023-05-03 VITALS — BODY MASS INDEX: 29.7 KG/M2 | WEIGHT: 219 LBS

## 2023-05-03 PROCEDURE — 93798 PHYS/QHP OP CAR RHAB W/ECG: CPT

## 2023-05-03 ASSESSMENT — LIFESTYLE VARIABLES
ALCOHOL_USE: WEEKLY
ALCOHOL_TYPE: LIQUOR

## 2023-05-03 ASSESSMENT — EXERCISE STRESS TEST
PEAK_HR: 112
PEAK_BP: 152/78
PEAK_METS: 3

## 2023-05-03 NOTE — PROGRESS NOTES
65-year-old male s/p CABG enrolled in cardiac rehabilitation, seen today for initial nutrition counseling. Patient stated goals: To learn more about a heart healthy and ADA diet. NUTRITION ASSESSMENT   Anthropometrics:   Ht: 6'   Wt: 219  BMI: 29.70  BMI class of Normal based on age above 72  Waist measurement (inches): 41    Medical History: PCI, CABGx4 on 3/16, prev PCI, HTN, HLD, T2DM, diabetic nephropathy, hypothyroidism   Nutrition related medications/supplements: ozempic, degludec, novolog  Nutrition Related Labs:   3/16/23 Total chol 140 TRIG 191 (H) HDL 35 LDL 66.8 A1C 8.8 (H)   Blood Sugar Monitorin-3x daily    Nutrition/Diet History:   Since surgery patient has made some changes to diet; cutback on salt & seasonings, started eating fruit,   He loves to cook and will only use fattier cuts of meat d/t flavor. He does read food labels to reference serving size and amount of carbs. Diet Recall:   Breakfast: medium bowl of frosted flakes with 2% milk   Lunch: turkey sandwich, some fritos  Dinner: large chicken pot pie (70g carbs)  Beverages: 4-6 glasses of water daily, unsweet tea   Alcohol use: 6 servings of liquor weekly    Eating patterns likely excessive in simple carbohydrates, sodium, added sugar, saturated fat and insufficient in fruit, vegetables, fiber, whole grains. NFPE: no muscle or fat loss observed. 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 Physical Activity: Rehabilitation 3 days per week, off days pt is walking some.      Barriers: -Established food preferences/eating behaviors -Current nutrition attitudes/beliefs     Stage of Behavior Change: Preparation    Estimated Nutritional Needs:  Calories: MSJ x 1.3: 2364kcal  Protein: (.6-.8g/kg CKD stage 3) 60-80g  CHO: (50% of estimated energy needs) 296g    NUTRITION DIAGNOSIS  Unbalanced intake related to food choices/food related knowledge as evidenced by pt

## 2023-05-04 ENCOUNTER — CLINICAL DOCUMENTATION (OUTPATIENT)
Dept: CARDIOTHORACIC SURGERY | Age: 68
End: 2023-05-04

## 2023-05-05 ENCOUNTER — HOSPITAL ENCOUNTER (OUTPATIENT)
Dept: CARDIAC REHAB | Age: 68
Setting detail: RECURRING SERIES
Discharge: HOME | End: 2023-05-08
Payer: COMMERCIAL

## 2023-05-05 PROCEDURE — 93798 PHYS/QHP OP CAR RHAB W/ECG: CPT

## 2023-05-05 ASSESSMENT — EXERCISE STRESS TEST
PEAK_METS: 3.1
PEAK_HR: 114

## 2023-05-08 ENCOUNTER — HOSPITAL ENCOUNTER (OUTPATIENT)
Dept: CARDIAC REHAB | Age: 68
Setting detail: RECURRING SERIES
Discharge: HOME | End: 2023-05-11
Payer: COMMERCIAL

## 2023-05-08 PROCEDURE — 93798 PHYS/QHP OP CAR RHAB W/ECG: CPT

## 2023-05-08 ASSESSMENT — EXERCISE STRESS TEST
PEAK_BP: 140/72
PEAK_HR: 117
PEAK_METS: 3.6

## 2023-05-10 ENCOUNTER — HOSPITAL ENCOUNTER (OUTPATIENT)
Dept: CARDIAC REHAB | Age: 68
Setting detail: RECURRING SERIES
Discharge: HOME | End: 2023-05-13
Payer: COMMERCIAL

## 2023-05-10 VITALS — BODY MASS INDEX: 29.16 KG/M2 | WEIGHT: 215 LBS

## 2023-05-10 PROCEDURE — 93798 PHYS/QHP OP CAR RHAB W/ECG: CPT

## 2023-05-10 ASSESSMENT — EXERCISE STRESS TEST
PEAK_HR: 111
PEAK_METS: 3.6

## 2023-05-12 ENCOUNTER — HOSPITAL ENCOUNTER (OUTPATIENT)
Dept: CARDIAC REHAB | Age: 68
Setting detail: RECURRING SERIES
Discharge: HOME | End: 2023-05-15
Payer: COMMERCIAL

## 2023-05-12 PROCEDURE — 93798 PHYS/QHP OP CAR RHAB W/ECG: CPT

## 2023-05-12 ASSESSMENT — EXERCISE STRESS TEST
PEAK_METS: 3.6
PEAK_HR: 112

## 2023-05-15 ENCOUNTER — HOSPITAL ENCOUNTER (OUTPATIENT)
Dept: CARDIAC REHAB | Age: 68
Setting detail: RECURRING SERIES
Discharge: HOME | End: 2023-05-18
Payer: COMMERCIAL

## 2023-05-15 PROCEDURE — 93798 PHYS/QHP OP CAR RHAB W/ECG: CPT

## 2023-05-15 ASSESSMENT — EXERCISE STRESS TEST
PEAK_BP: 132/64
PEAK_HR: 116
PEAK_METS: 3.6

## 2023-05-17 ENCOUNTER — HOSPITAL ENCOUNTER (OUTPATIENT)
Dept: CARDIAC REHAB | Age: 68
Setting detail: RECURRING SERIES
Discharge: HOME | End: 2023-05-20
Payer: COMMERCIAL

## 2023-05-17 PROCEDURE — 93798 PHYS/QHP OP CAR RHAB W/ECG: CPT

## 2023-05-17 ASSESSMENT — EXERCISE STRESS TEST
PEAK_METS: 3.6
PEAK_HR: 119

## 2023-05-19 ENCOUNTER — HOSPITAL ENCOUNTER (OUTPATIENT)
Dept: CARDIAC REHAB | Age: 68
Setting detail: RECURRING SERIES
Discharge: HOME | End: 2023-05-22
Payer: COMMERCIAL

## 2023-05-19 VITALS — BODY MASS INDEX: 29.1 KG/M2 | WEIGHT: 214.6 LBS

## 2023-05-19 PROCEDURE — 93798 PHYS/QHP OP CAR RHAB W/ECG: CPT

## 2023-05-19 ASSESSMENT — EXERCISE STRESS TEST
PEAK_METS: 3.6
PEAK_HR: 115

## 2023-05-22 ENCOUNTER — HOSPITAL ENCOUNTER (OUTPATIENT)
Dept: CARDIAC REHAB | Age: 68
Setting detail: RECURRING SERIES
End: 2023-05-22
Payer: COMMERCIAL

## 2023-05-22 ASSESSMENT — LIFESTYLE VARIABLES
ALCOHOL_TYPE: LIQUOR
ALCOHOL_USE: WEEKLY
SMOKELESS_TOBACCO: NO

## 2023-05-22 ASSESSMENT — EJECTION FRACTION: EF_VALUE: 50

## 2023-05-22 ASSESSMENT — EXERCISE STRESS TEST: PEAK_BP: 132/64

## 2023-05-24 ENCOUNTER — APPOINTMENT (OUTPATIENT)
Dept: CARDIAC REHAB | Age: 68
End: 2023-05-24
Payer: COMMERCIAL

## 2023-05-26 ENCOUNTER — APPOINTMENT (OUTPATIENT)
Dept: CARDIAC REHAB | Age: 68
End: 2023-05-26
Payer: COMMERCIAL

## 2023-05-29 RX ORDER — ATORVASTATIN CALCIUM 80 MG/1
80 TABLET, FILM COATED ORAL DAILY
Qty: 90 TABLET | Refills: 1 | Status: SHIPPED | OUTPATIENT
Start: 2023-05-29

## 2023-05-31 ENCOUNTER — APPOINTMENT (OUTPATIENT)
Dept: CARDIAC REHAB | Age: 68
End: 2023-05-31
Payer: COMMERCIAL

## 2023-06-09 ENCOUNTER — TELEPHONE (OUTPATIENT)
Age: 68
End: 2023-06-09

## 2023-06-22 ENCOUNTER — TELEPHONE (OUTPATIENT)
Age: 68
End: 2023-06-22

## 2023-07-13 ENCOUNTER — OFFICE VISIT (OUTPATIENT)
Age: 68
End: 2023-07-13
Payer: COMMERCIAL

## 2023-07-13 VITALS
WEIGHT: 220 LBS | HEIGHT: 72 IN | HEART RATE: 64 BPM | BODY MASS INDEX: 29.8 KG/M2 | SYSTOLIC BLOOD PRESSURE: 140 MMHG | DIASTOLIC BLOOD PRESSURE: 82 MMHG

## 2023-07-13 DIAGNOSIS — I25.10 CORONARY ARTERY DISEASE INVOLVING NATIVE CORONARY ARTERY OF NATIVE HEART WITHOUT ANGINA PECTORIS: Primary | ICD-10-CM

## 2023-07-13 DIAGNOSIS — E78.00 PURE HYPERCHOLESTEROLEMIA: ICD-10-CM

## 2023-07-13 DIAGNOSIS — I10 ESSENTIAL HYPERTENSION: ICD-10-CM

## 2023-07-13 DIAGNOSIS — E13.9 DIABETES MELLITUS TYPE 1.5 (HCC): ICD-10-CM

## 2023-07-13 PROCEDURE — 3077F SYST BP >= 140 MM HG: CPT | Performed by: INTERNAL MEDICINE

## 2023-07-13 PROCEDURE — 3079F DIAST BP 80-89 MM HG: CPT | Performed by: INTERNAL MEDICINE

## 2023-07-13 PROCEDURE — 1123F ACP DISCUSS/DSCN MKR DOCD: CPT | Performed by: INTERNAL MEDICINE

## 2023-07-13 PROCEDURE — 99214 OFFICE O/P EST MOD 30 MIN: CPT | Performed by: INTERNAL MEDICINE

## 2023-07-13 PROCEDURE — 3052F HG A1C>EQUAL 8.0%<EQUAL 9.0%: CPT | Performed by: INTERNAL MEDICINE

## 2023-07-13 ASSESSMENT — ENCOUNTER SYMPTOMS
BACK PAIN: 0
SHORTNESS OF BREATH: 0
ABDOMINAL PAIN: 0
COUGH: 0

## 2023-07-13 NOTE — PROGRESS NOTES
[facility] CARDIOLOGY  [id number] [place] [address]  [facility] [address] [place] [address]      23      NAME:  Adolfo Garcia  : 1955  MRN: [id number]      SUBJECTIVE:   Adolfo Garcia is a 74 y.o. male seen for a follow up visit regarding the following:     Chief Complaint   Patient presents with    Coronary Artery Disease    Hypertension    Hyperlipidemia       HPI:   74 y.o. male with history of remote CAD and PCI. He presented to the hospital 2023 with delayed presentation anterior myocardial infarction complicated by VT/VF arrest.  Patient underwent CABG x4 with Impella assistance. Patient recovered well postoperatively. He denies chest pain but has appropriate improving musculoskeletal pain postoperatively. Past Medical History, Past Surgical History, Family history, Social History, and Medications were all reviewed with the patient today and updated as necessary. Current Outpatient Medications   Medication Sig Dispense Refill    atorvastatin (LIPITOR) 80 MG tablet Take 1 tablet by mouth daily 90 tablet 1    metoprolol tartrate (LOPRESSOR) 25 MG tablet Take 1 tablet by mouth 2 times daily 180 tablet 3    amLODIPine (NORVASC) 5 MG tablet Take 1 tablet by mouth daily 90 tablet 3    clopidogrel (PLAVIX) 75 MG tablet Take 1 tablet by mouth daily 90 tablet 3    nitroGLYCERIN (NITROSTAT) 0.4 MG SL tablet Use as directed for chest pain 25 tablet 3    acetaminophen (TYLENOL) 325 MG tablet Take 2 tablets by mouth every 6 hours as needed for Pain 120 tablet 3    Insulin Degludec 100 UNIT/ML SOPN Inject 52 Units into the skin nightly 1 Adjustable Dose Pre-filled Pen Syringe 0    aspirin 81 MG chewable tablet Take 1 tablet by mouth daily      allopurinol (ZYLOPRIM) 100 MG tablet Take 1 tablet by mouth in the morning.  90 tablet 3    fenofibrate (TRIGLIDE) 160 MG tablet Take 1 tablet by mouth daily      insulin aspart (NOVOLOG) 100 UNIT/ML injection vial Inject into the skin as needed Patient

## 2023-07-30 RX ORDER — ALLOPURINOL 100 MG/1
100 TABLET ORAL DAILY
Qty: 90 TABLET | Refills: 3 | Status: SHIPPED | OUTPATIENT
Start: 2023-07-30

## 2023-09-27 ENCOUNTER — TELEPHONE (OUTPATIENT)
Age: 68
End: 2023-09-27

## 2023-09-27 NOTE — TELEPHONE ENCOUNTER
Pt had to move the sept appt due to insurance issues. Pt new appt is 2/24. Wants to know if it is ok to wait that long for FU.

## 2023-09-28 NOTE — TELEPHONE ENCOUNTER
Called patient and informed him that as long as he is feeling well he is fine to wait until February to be seen

## 2023-12-01 ENCOUNTER — OFFICE VISIT (OUTPATIENT)
Age: 68
End: 2023-12-01
Payer: MEDICARE

## 2023-12-01 VITALS
BODY MASS INDEX: 30.88 KG/M2 | HEART RATE: 68 BPM | DIASTOLIC BLOOD PRESSURE: 78 MMHG | WEIGHT: 228 LBS | HEIGHT: 72 IN | SYSTOLIC BLOOD PRESSURE: 138 MMHG

## 2023-12-01 DIAGNOSIS — I25.10 CORONARY ARTERY DISEASE INVOLVING NATIVE CORONARY ARTERY OF NATIVE HEART WITHOUT ANGINA PECTORIS: Primary | ICD-10-CM

## 2023-12-01 DIAGNOSIS — E13.9 DIABETES MELLITUS TYPE 1.5 (HCC): ICD-10-CM

## 2023-12-01 DIAGNOSIS — E78.00 PURE HYPERCHOLESTEROLEMIA: ICD-10-CM

## 2023-12-01 DIAGNOSIS — I10 ESSENTIAL HYPERTENSION: ICD-10-CM

## 2023-12-01 PROBLEM — J81.0 ACUTE PULMONARY EDEMA (HCC): Status: RESOLVED | Noted: 2023-03-19 | Resolved: 2023-12-01

## 2023-12-01 PROBLEM — Z95.5 HISTORY OF CORONARY ARTERY STENT PLACEMENT: Status: RESOLVED | Noted: 2023-03-16 | Resolved: 2023-12-01

## 2023-12-01 PROBLEM — Z95.1 S/P CABG X 4: Status: RESOLVED | Noted: 2023-03-21 | Resolved: 2023-12-01

## 2023-12-01 PROBLEM — J90 PLEURAL EFFUSION: Status: RESOLVED | Noted: 2023-03-21 | Resolved: 2023-12-01

## 2023-12-01 PROCEDURE — 1123F ACP DISCUSS/DSCN MKR DOCD: CPT | Performed by: INTERNAL MEDICINE

## 2023-12-01 PROCEDURE — 3052F HG A1C>EQUAL 8.0%<EQUAL 9.0%: CPT | Performed by: INTERNAL MEDICINE

## 2023-12-01 PROCEDURE — 99214 OFFICE O/P EST MOD 30 MIN: CPT | Performed by: INTERNAL MEDICINE

## 2023-12-01 PROCEDURE — 3075F SYST BP GE 130 - 139MM HG: CPT | Performed by: INTERNAL MEDICINE

## 2023-12-01 PROCEDURE — 3078F DIAST BP <80 MM HG: CPT | Performed by: INTERNAL MEDICINE

## 2023-12-01 ASSESSMENT — ENCOUNTER SYMPTOMS
ABDOMINAL PAIN: 0
SHORTNESS OF BREATH: 0
BACK PAIN: 0
COUGH: 0

## 2023-12-01 NOTE — PROGRESS NOTES
office with any issues or other concerns related to their cardiac condition(s) and/or complaint(s). Return in about 6 months (around 6/1/2024).        Kalani Jacome MD  12/1/2023

## 2023-12-13 RX ORDER — ATORVASTATIN CALCIUM 80 MG/1
80 TABLET, FILM COATED ORAL DAILY
Qty: 90 TABLET | Refills: 1 | Status: SHIPPED | OUTPATIENT
Start: 2023-12-13

## 2024-01-10 DIAGNOSIS — I10 ESSENTIAL HYPERTENSION: ICD-10-CM

## 2024-01-10 RX ORDER — AMLODIPINE BESYLATE 5 MG/1
5 TABLET ORAL DAILY
Qty: 90 TABLET | Refills: 3 | Status: SHIPPED | OUTPATIENT
Start: 2024-01-10

## 2024-01-18 ENCOUNTER — PATIENT MESSAGE (OUTPATIENT)
Age: 69
End: 2024-01-18

## 2024-01-19 NOTE — TELEPHONE ENCOUNTER
ANNMARIE (acute kidney injury) ([facility]) 03/19/2023       Priority: High    Acute respiratory failure with hypoxia ([facility]) 03/16/2023       Priority: Medium    Diabetes mellitus type 1.5 ([facility]) 02/03/2016       Priority: Low    Coronary artery disease involving native heart without angina pectoris 02/03/2016       Priority: Low       03/2023:  CABG x 4 and PARAM clipping       Essential hypertension 02/03/2016       Priority: Low    Acquired hypothyroidism 02/03/2016       Priority: Low    Hyperlipidemia 02/03/2016       Priority: Low

## 2024-01-19 NOTE — TELEPHONE ENCOUNTER
From: Obinna Bunn  To: Dr. Junior Lomeli  Sent: 1/18/2024 4:17 PM EST  Subject: Eversense CGM    Good Morning,    Dr. Ramos is indicating he wants try me with a Eversense CGM for realtime results for 'blood glucose.    Install this in the upper part of the arm and rotate every six months from arm to arm. I believe this will help me in the long run in managing my diabetes, since it is instant information.    His concerns is the Plavix. I believe the max one should stay on this medication is a year. My anniversary will be March of this year.    What are your thoughts?    Many Thanks in Advance....Blanco

## 2024-01-22 NOTE — TELEPHONE ENCOUNTER
Plavix should not be an issue with CGM.  As I young patient we will often treat with plavix for years unless develops issues with bleeding.

## 2024-02-04 ENCOUNTER — PATIENT MESSAGE (OUTPATIENT)
Age: 69
End: 2024-02-04

## 2024-02-04 DIAGNOSIS — I25.10 CORONARY ARTERY DISEASE INVOLVING NATIVE CORONARY ARTERY OF NATIVE HEART WITHOUT ANGINA PECTORIS: ICD-10-CM

## 2024-02-04 DIAGNOSIS — I10 ESSENTIAL HYPERTENSION: Primary | ICD-10-CM

## 2024-02-05 RX ORDER — FUROSEMIDE 20 MG/1
20 TABLET ORAL DAILY
Qty: 90 TABLET | Refills: 1 | Status: SHIPPED | OUTPATIENT
Start: 2024-02-05

## 2024-02-05 NOTE — TELEPHONE ENCOUNTER
Requested Prescriptions     Pending Prescriptions Disp Refills    furosemide (LASIX) 20 MG tablet 90 tablet 1     Sig: Take 1 tablet by mouth daily      Informed patient of physician recommendations. Voiced understanding. Lab orders added.

## 2024-02-13 DIAGNOSIS — I10 ESSENTIAL HYPERTENSION: ICD-10-CM

## 2024-02-13 DIAGNOSIS — I25.10 CORONARY ARTERY DISEASE INVOLVING NATIVE CORONARY ARTERY OF NATIVE HEART WITHOUT ANGINA PECTORIS: ICD-10-CM

## 2024-02-14 LAB
ANION GAP SERPL CALC-SCNC: 6 MMOL/L (ref 2–11)
BUN SERPL-MCNC: 23 MG/DL (ref 8–23)
CALCIUM SERPL-MCNC: 10.2 MG/DL (ref 8.3–10.4)
CHLORIDE SERPL-SCNC: 108 MMOL/L (ref 103–113)
CO2 SERPL-SCNC: 24 MMOL/L (ref 21–32)
CREAT SERPL-MCNC: 1.9 MG/DL (ref 0.8–1.5)
GLUCOSE SERPL-MCNC: 196 MG/DL (ref 65–100)
MAGNESIUM SERPL-MCNC: 2.5 MG/DL (ref 1.8–2.4)
NT PRO BNP: 402 PG/ML (ref 5–125)
POTASSIUM SERPL-SCNC: 4.4 MMOL/L (ref 3.5–5.1)
SODIUM SERPL-SCNC: 138 MMOL/L (ref 136–146)

## 2024-02-15 ENCOUNTER — PATIENT MESSAGE (OUTPATIENT)
Age: 69
End: 2024-02-15

## 2024-02-15 DIAGNOSIS — I10 ESSENTIAL HYPERTENSION: Primary | ICD-10-CM

## 2024-02-21 DIAGNOSIS — I10 ESSENTIAL HYPERTENSION: ICD-10-CM

## 2024-02-21 LAB
ANION GAP SERPL CALC-SCNC: 6 MMOL/L (ref 2–11)
BUN SERPL-MCNC: 17 MG/DL (ref 8–23)
CALCIUM SERPL-MCNC: 9.7 MG/DL (ref 8.3–10.4)
CHLORIDE SERPL-SCNC: 105 MMOL/L (ref 103–113)
CO2 SERPL-SCNC: 27 MMOL/L (ref 21–32)
CREAT SERPL-MCNC: 1.6 MG/DL (ref 0.8–1.5)
GLUCOSE SERPL-MCNC: 314 MG/DL (ref 65–100)
POTASSIUM SERPL-SCNC: 4.7 MMOL/L (ref 3.5–5.1)
SODIUM SERPL-SCNC: 138 MMOL/L (ref 136–146)

## 2024-03-06 ENCOUNTER — TELEPHONE (OUTPATIENT)
Age: 69
End: 2024-03-06

## 2024-03-06 ENCOUNTER — PATIENT MESSAGE (OUTPATIENT)
Age: 69
End: 2024-03-06

## 2024-03-06 DIAGNOSIS — I10 ESSENTIAL HYPERTENSION: Primary | ICD-10-CM

## 2024-03-06 DIAGNOSIS — I25.10 CORONARY ARTERY DISEASE INVOLVING NATIVE HEART WITHOUT ANGINA PECTORIS: ICD-10-CM

## 2024-03-06 RX ORDER — FUROSEMIDE 40 MG/1
40 TABLET ORAL DAILY
Qty: 30 TABLET | Refills: 5 | Status: SHIPPED | OUTPATIENT
Start: 2024-03-06

## 2024-03-06 RX ORDER — SPIRONOLACTONE 25 MG/1
25 TABLET ORAL DAILY
Qty: 30 TABLET | Refills: 5 | Status: SHIPPED | OUTPATIENT
Start: 2024-03-06

## 2024-03-06 NOTE — TELEPHONE ENCOUNTER
Patient should stop amlodipine.  Most of patient's weight gain is not related to fluid but related to generalized normal weight gain.  He needs to focus on diet and weight loss.  He may start furosemide 40 mg daily.  May also start spironolactone 25 mg daily.  Please have BMP and magnesium checked in 2 weeks.  Patient should focus on keeping feet elevated and wearing support stockings.  But primarily needs to focus on aggressive salt reduction as well as diet modifications to achieve weight loss.

## 2024-03-06 NOTE — TELEPHONE ENCOUNTER
I called and informed pt.of MD response and he v/u.  Meds escribed as below and labs ordered as below.  Requested Prescriptions     Signed Prescriptions Disp Refills    furosemide (LASIX) 40 MG tablet 30 tablet 5     Sig: Take 1 tablet by mouth daily     Authorizing Provider: WILFRIDO KING     Ordering User: CASIMIRO SOLO    spironolactone (ALDACTONE) 25 MG tablet 30 tablet 5     Sig: Take 1 tablet by mouth daily     Authorizing Provider: WILFRIDO KING     Ordering User: CASIMIRO SOLO     Orders Placed This Encounter   Procedures    Basic Metabolic Panel     Standing Status:   Future     Standing Expiration Date:   3/6/2025    Magnesium     Standing Status:   Future     Standing Expiration Date:   3/6/2025

## 2024-03-06 NOTE — TELEPHONE ENCOUNTER
----- Message from Franci Newton MA sent at 3/6/2024  7:39 AM EST -----  Regarding: FW: Follow up from Labs  Contact: [phone]    ----- Message -----  From: Obinna Bunn  Sent: 3/6/2024   7:08 AM EST  To: hospitals Cardiology Clinical Staff  Subject: Follow up from Labs                              From my previous concerns with swelling in my feet and legs, labs indicated the kidneys where working to hard.  Stopped taking Furosemide 20mg as y ou had requested a week later.  This was around Feb 4.  30 days has passed since then.  Still the same, swollen feet and legs.  What other options to get this under control.  My current weight is about 230lbs, 20lb gain from last March.  All of the swelling started about four months back.  Please AdviseBlanco

## 2024-03-06 NOTE — TELEPHONE ENCOUNTER
Pt.off Lasix due to elevated creatinine.Last creatinine 2/21 was 1.60.Still has BLE edema swelling thighs down in legs.Legs go down some at night but not much.He denies a cough.Pitting edema w/rt.worse than left leg.He is SOB at times ,denies any CP.He is able to lie flat w/out any issues.Blood sugars are really high is seeing endocrinology.He has gained 15-20 pounds in last 4 months.He ask what he should do about ongoing edema?

## 2024-03-19 DIAGNOSIS — I10 ESSENTIAL HYPERTENSION: ICD-10-CM

## 2024-03-19 DIAGNOSIS — I25.10 CORONARY ARTERY DISEASE INVOLVING NATIVE HEART WITHOUT ANGINA PECTORIS: ICD-10-CM

## 2024-03-19 DIAGNOSIS — E78.00 PURE HYPERCHOLESTEROLEMIA: ICD-10-CM

## 2024-03-19 LAB
ANION GAP SERPL CALC-SCNC: 6 MMOL/L (ref 2–11)
BUN SERPL-MCNC: 29 MG/DL (ref 8–23)
CALCIUM SERPL-MCNC: 10 MG/DL (ref 8.3–10.4)
CHLORIDE SERPL-SCNC: 104 MMOL/L (ref 103–113)
CHOLEST SERPL-MCNC: 200 MG/DL
CO2 SERPL-SCNC: 24 MMOL/L (ref 21–32)
CREAT SERPL-MCNC: 2 MG/DL (ref 0.8–1.5)
ERYTHROCYTE [DISTWIDTH] IN BLOOD BY AUTOMATED COUNT: 12.8 % (ref 11.9–14.6)
GLUCOSE SERPL-MCNC: 343 MG/DL (ref 65–100)
HCT VFR BLD AUTO: 38.4 % (ref 41.1–50.3)
HDLC SERPL-MCNC: 32 MG/DL (ref 40–60)
HDLC SERPL: 6.3
HGB BLD-MCNC: 13 G/DL (ref 13.6–17.2)
LDLC SERPL CALC-MCNC: ABNORMAL MG/DL
LDLC SERPL DIRECT ASSAY-MCNC: 125 MG/DL
MAGNESIUM SERPL-MCNC: 2 MG/DL (ref 1.8–2.4)
MCH RBC QN AUTO: 30.7 PG (ref 26.1–32.9)
MCHC RBC AUTO-ENTMCNC: 33.9 G/DL (ref 31.4–35)
MCV RBC AUTO: 90.6 FL (ref 82–102)
NRBC # BLD: 0 K/UL (ref 0–0.2)
PLATELET # BLD AUTO: 211 K/UL (ref 150–450)
PMV BLD AUTO: 10.9 FL (ref 9.4–12.3)
POTASSIUM SERPL-SCNC: 4.1 MMOL/L (ref 3.5–5.1)
RBC # BLD AUTO: 4.24 M/UL (ref 4.23–5.6)
SODIUM SERPL-SCNC: 134 MMOL/L (ref 136–146)
TRIGL SERPL-MCNC: 511 MG/DL (ref 35–150)
VLDLC SERPL CALC-MCNC: 102.2 MG/DL (ref 6–23)
WBC # BLD AUTO: 3.8 K/UL (ref 4.3–11.1)

## 2024-03-20 ENCOUNTER — PATIENT MESSAGE (OUTPATIENT)
Age: 69
End: 2024-03-20

## 2024-03-20 NOTE — TELEPHONE ENCOUNTER
Patient is developed acute kidney injury with the addition of diuretics.  I do not believe the patient has significant volume overload and should stop furosemide.  Patient needs to see his primary care physician to address his very poorly controlled diabetes which is leading to very poorly controlled lipids primarily his triglycerides.  This is also leading to progressive renal failure.

## 2024-03-22 ENCOUNTER — TELEPHONE (OUTPATIENT)
Age: 69
End: 2024-03-22

## 2024-03-22 DIAGNOSIS — N17.9 AKI (ACUTE KIDNEY INJURY) (HCC): Primary | ICD-10-CM

## 2024-03-22 DIAGNOSIS — I25.10 CORONARY ARTERY DISEASE INVOLVING NATIVE HEART WITHOUT ANGINA PECTORIS: ICD-10-CM

## 2024-03-22 DIAGNOSIS — I10 ESSENTIAL HYPERTENSION: ICD-10-CM

## 2024-03-22 NOTE — TELEPHONE ENCOUNTER
----- Message from Junior Lomeli MD sent at 3/21/2024  1:37 PM EDT -----  Regarding: FW: Obinna Bunn lab work  Contact: [phone]        ----- Message -----  From: Franci Newton MA  Sent: 3/21/2024  11:49 AM EDT  To: Junior Lomeli MD  Subject: FW: Obinna Bunn lab work                       Do you want him to stop his Spironolactone and Furosemide?      ----- Message -----  From: Queta Stinson MA  Sent: 3/20/2024   4:35 PM EDT  To: Franci Newton MA  Subject: FW: Obinna Bunn lab work                         ----- Message -----  From: Obinna Bunn  Sent: 3/20/2024   4:34 PM EDT  To: [facility] Cardiology Clinical Staff  Subject: Obinna Bunn lab work                           So, the endocrinologist is treating the diabetes. I have been approved for Tristen. [facility]ing [facility]. So do I stop lasix in addition to the other diuretic as he prescribed?

## 2024-04-01 ASSESSMENT — PATIENT HEALTH QUESTIONNAIRE - PHQ9
1. LITTLE INTEREST OR PLEASURE IN DOING THINGS: NOT AT ALL
SUM OF ALL RESPONSES TO PHQ9 QUESTIONS 1 & 2: 0
2. FEELING DOWN, DEPRESSED OR HOPELESS: NOT AT ALL
SUM OF ALL RESPONSES TO PHQ QUESTIONS 1-9: 0
2. FEELING DOWN, DEPRESSED OR HOPELESS: NOT AT ALL
1. LITTLE INTEREST OR PLEASURE IN DOING THINGS: NOT AT ALL
SUM OF ALL RESPONSES TO PHQ QUESTIONS 1-9: 0
SUM OF ALL RESPONSES TO PHQ9 QUESTIONS 1 & 2: 0

## 2024-04-02 ENCOUNTER — TELEPHONE (OUTPATIENT)
Dept: FAMILY MEDICINE CLINIC | Facility: CLINIC | Age: 69
End: 2024-04-02

## 2024-04-02 NOTE — TELEPHONE ENCOUNTER
[employer] called to check on status of prior authorization for Free Style Ferdinand III glucose monitor. Please check on this and call them back at [phone] p.a.#[id number].

## 2024-04-03 DIAGNOSIS — I25.10 CORONARY ARTERY DISEASE INVOLVING NATIVE HEART WITHOUT ANGINA PECTORIS: ICD-10-CM

## 2024-04-03 DIAGNOSIS — I10 ESSENTIAL HYPERTENSION: ICD-10-CM

## 2024-04-03 DIAGNOSIS — N17.9 AKI (ACUTE KIDNEY INJURY) (HCC): ICD-10-CM

## 2024-04-03 LAB
ANION GAP SERPL CALC-SCNC: 4 MMOL/L (ref 2–11)
BUN SERPL-MCNC: 22 MG/DL (ref 8–23)
CALCIUM SERPL-MCNC: 9.3 MG/DL (ref 8.3–10.4)
CHLORIDE SERPL-SCNC: 108 MMOL/L (ref 103–113)
CO2 SERPL-SCNC: 28 MMOL/L (ref 21–32)
CREAT SERPL-MCNC: 1.7 MG/DL (ref 0.8–1.5)
GLUCOSE SERPL-MCNC: 158 MG/DL (ref 65–100)
POTASSIUM SERPL-SCNC: 4.3 MMOL/L (ref 3.5–5.1)
SODIUM SERPL-SCNC: 140 MMOL/L (ref 136–146)

## 2024-04-04 ENCOUNTER — TELEPHONE (OUTPATIENT)
Age: 69
End: 2024-04-04

## 2024-04-04 ENCOUNTER — OFFICE VISIT (OUTPATIENT)
Dept: FAMILY MEDICINE CLINIC | Facility: CLINIC | Age: 69
End: 2024-04-04
Payer: MEDICARE

## 2024-04-04 VITALS
BODY MASS INDEX: 33.1 KG/M2 | DIASTOLIC BLOOD PRESSURE: 84 MMHG | HEIGHT: 72 IN | TEMPERATURE: 97.3 F | OXYGEN SATURATION: 97 % | HEART RATE: 62 BPM | SYSTOLIC BLOOD PRESSURE: 138 MMHG | WEIGHT: 244.4 LBS

## 2024-04-04 DIAGNOSIS — E03.9 ACQUIRED HYPOTHYROIDISM: ICD-10-CM

## 2024-04-04 DIAGNOSIS — N18.32 STAGE 3B CHRONIC KIDNEY DISEASE (HCC): Primary | ICD-10-CM

## 2024-04-04 DIAGNOSIS — R60.9 SWELLING: ICD-10-CM

## 2024-04-04 DIAGNOSIS — I25.10 CORONARY ARTERY DISEASE INVOLVING NATIVE CORONARY ARTERY OF NATIVE HEART WITHOUT ANGINA PECTORIS: ICD-10-CM

## 2024-04-04 DIAGNOSIS — R60.0 LOCALIZED EDEMA: ICD-10-CM

## 2024-04-04 LAB
APPEARANCE UR: CLEAR
BACTERIA URNS QL MICRO: NEGATIVE /HPF
BILIRUB UR QL: NEGATIVE
CASTS URNS QL MICRO: ABNORMAL /LPF (ref 0–2)
COLOR UR: ABNORMAL
EPI CELLS #/AREA URNS HPF: ABNORMAL /HPF (ref 0–5)
GLUCOSE UR STRIP.AUTO-MCNC: NEGATIVE MG/DL
HGB UR QL STRIP: NEGATIVE
KETONES UR QL STRIP.AUTO: NEGATIVE MG/DL
LEUKOCYTE ESTERASE UR QL STRIP.AUTO: NEGATIVE
MUCOUS THREADS URNS QL MICRO: 0 /LPF
NITRITE UR QL STRIP.AUTO: NEGATIVE
PH UR STRIP: 6 (ref 5–9)
PROT UR STRIP-MCNC: ABNORMAL MG/DL
RBC #/AREA URNS HPF: ABNORMAL /HPF (ref 0–5)
SP GR UR REFRACTOMETRY: 1.02 (ref 1–1.02)
URINE CULTURE IF INDICATED: ABNORMAL
UROBILINOGEN UR QL STRIP.AUTO: 1 EU/DL (ref 0.2–1)
WBC URNS QL MICRO: ABNORMAL /HPF (ref 0–4)

## 2024-04-04 PROCEDURE — 3075F SYST BP GE 130 - 139MM HG: CPT | Performed by: FAMILY MEDICINE

## 2024-04-04 PROCEDURE — 99214 OFFICE O/P EST MOD 30 MIN: CPT | Performed by: FAMILY MEDICINE

## 2024-04-04 PROCEDURE — 1123F ACP DISCUSS/DSCN MKR DOCD: CPT | Performed by: FAMILY MEDICINE

## 2024-04-04 PROCEDURE — 3078F DIAST BP <80 MM HG: CPT | Performed by: FAMILY MEDICINE

## 2024-04-04 ASSESSMENT — ENCOUNTER SYMPTOMS
ABDOMINAL PAIN: 0
SINUS PRESSURE: 0
DIARRHEA: 0
CONSTIPATION: 0
CHEST TIGHTNESS: 0
BACK PAIN: 0
EYE DISCHARGE: 0
NAUSEA: 0
SHORTNESS OF BREATH: 0

## 2024-04-04 NOTE — TELEPHONE ENCOUNTER
----- Message from Junior Lomeli MD sent at 4/4/2024  7:48 AM EDT -----  If you could please call patient and let him know that his kidney function has improved closer to baseline.  He still has underlying significant chronic kidney disease and likely will need referral to kidney specialist.  Will defer renal management to his primary care physician.

## 2024-04-04 NOTE — PROGRESS NOTES
Patient failed to show for last appt w/ PCP 3-18-24  He also did not complete the UDS that PCP ordered back in   Last CSA   Will hold for PCP approval   of ventricular tachycardia occurred and lidocaine 50mg was given. Pulses were maintained throughout. CVC was placed in Left IJ and patient was sedated with fentanyl and propofol. Amiodarone dose was reduced after persistent bradycardia developed. Subjective:     CABG x 4 03/17/23 urgently, intubated and on impella at P8, spiking fevers to 101.9, no clear evidence for infection on ancef post op, cultures obtained    Review of Systems: Unable to obtain due to patient factors. Current Outpatient Medications   Medication Instructions    allopurinol (ZYLOPRIM) 100 mg, Oral, DAILY    aspirin 81 mg, Oral, DAILY    atorvastatin (LIPITOR) 80 mg, Oral, DAILY    cyanocobalamin 1,000 mcg, DAILY    dapagliflozin (FARXIGA) 10 mg, DAILY    fenofibrate (TRIGLIDE) 160 mg, Oral, DAILY    insulin aspart (NOVOLOG) 100 UNIT/ML injection vial SubCUTAneous, PRN, Patient take 1:9 carb ratio plus 3 units/50 > 150    Insulin Degludec 92 Units, SubCUTAneous, NIGHTLY    levothyroxine (SYNTHROID) 175 mcg, Oral, DAILY BEFORE BREAKFAST    lisinopril (PRINIVIL;ZESTRIL) 20 MG tablet Take one tab daily. metFORMIN (GLUCOPHAGE) 1,000 mg, Oral, 2 TIMES DAILY WITH MEALS    nitroGLYCERIN (NITROSTAT) 0.4 MG SL tablet Use as directed for chest pain    omeprazole (PRILOSEC) 20 mg, DAILY    Ozempic (1 MG/DOSE) 1 mg, SubCUTAneous, EVERY 7 DAYS    triamterene-hydroCHLOROthiazide (MAXZIDE-25) 37.5-25 MG per tablet TAKE ONE TABLET BY MOUTH ONE TIME DAILY      Past Medical History:   Diagnosis Date    Diabetes ([address]     GERD (gastroesophageal reflux disease)     Gout     Hypercholesterolemia     Thyroid disease      Past Surgical History:   Procedure Laterality Date    CARDIAC PROCEDURE N/A 3/15/2023    LEFT HEART CATH / CORONARY ANGIOGRAPHY performed by Damien Bernard MD at [facility] [address]  2005    M. I.      Social History     Socioeconomic History    Marital status:  by: Unknown Provider Result on 3/15/2023 12:00 AM    MICRO:   Recent Labs     03/16/23 2038 03/18/23  0754 03/18/23  0755   CULTURE MRSA target DNA not detected, SA target DNA detected. A MRSA negative, SA positive test result does not preclude MRSA nasal colonization. * NO GROWTH AFTER 22 HOURS NO GROWTH AFTER 22 HOURS       Assessment and Plan:  (Medical Decision Making)     Active Hospital Problems    *NSTEMI (non-ST elevated myocardial infarction) (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 [address]  Off amiodarone infusion post CABG and no further ventricular arrhythmaist since surgery    Acute respiratory failure with hypoxia ([facility]  Will likely remain intubated till LVAD support less intense will probaly start ventilator weaning process on Monday, still volume up Cr elevated but improving likely due to pre renal phenomenon of low CO, pulmonary edema on CXR with full impella support, will attempt diuresis, give 1 dose of lasix at 490 mg x 1, if effective and hemodynamically stable, will start lasix at 40 mg q 12h x 4 doses. With decrease in preload his hemodynamics and CO may improve thus improving renal perfusion and function. Essential hypertension      Hyperlipidemia      Diabetes mellitus type 1.5 (HCC)  SSI. Glucose controlled      Full Code    More than 50% of the time documented was spent in face-to-face contact with the patient and in the care of the patient on the floor/unit where the patient is located.   The patient is critically ill with respiratory failure, circulatory failure and requires high complexity decision making for assessment and support including frequent ventilator adjustment , frequent evaluation and titration of therapies , application of advanced monitoring technologies and

## 2024-04-04 NOTE — PROGRESS NOTES
Obinna Bunn is a 68 y.o. male who presents with   Chief Complaint   Patient presents with    Swelling     Feet, ankles, legs. Had CABG x4. Edema in legs resolved after approx 1 month. Returned in both feet and hands. Recently had labs with cardio. Has been on diuretics intermittently - creatinine varying.       History of Present Illness  Pt with CABG x 4 1 year ago with increased swelling for last few weeks.  EF 52% in Sept. On Lasix and Spironolactone but holding after increased Cr.  Weight up 24 lb since Sept.  Recheck of DM.  No increased urination or thirst.   No numbness in feet.  No CP or SOB.  Has been exercising.  Weight has been about the same.  Doing fairly well with diet.  No nausea or vomiting.  Last A1C was  8.5 in Feb.  Doing better with CGM and Mounjaro.       Review of Systems  Review of Systems   Constitutional:  Negative for appetite change, fatigue, fever and unexpected weight change.   HENT:  Negative for congestion and sinus pressure.    Eyes:  Negative for discharge.   Respiratory:  Negative for chest tightness, shortness of breath and wheezing.    Cardiovascular:  Negative for chest pain, palpitations and leg swelling.   Gastrointestinal:  Negative for abdominal pain, blood in stool, constipation, diarrhea and nausea.   Endocrine: Negative for polyuria.   Genitourinary:  Negative for dysuria.   Musculoskeletal:  Negative for back pain and joint swelling.   Skin:  Negative for rash.   Neurological:  Negative for dizziness and headaches.   Hematological:  Negative for adenopathy.   Psychiatric/Behavioral:  Negative for dysphoric mood. The patient is not nervous/anxious.         Medications  Current Outpatient Medications   Medication Sig Dispense Refill    Tirzepatide (MOUNJARO) 2.5 MG/0.5ML SOPN SC injection       atorvastatin (LIPITOR) 80 MG tablet Take 1 tablet by mouth daily 90 tablet 1    allopurinol (ZYLOPRIM) 100 MG tablet Take 1 tablet by mouth daily 90 tablet 3    metoprolol

## 2024-04-05 LAB
ALBUMIN SERPL-MCNC: 4.6 G/DL (ref 3.2–4.6)
ALBUMIN/GLOB SERPL: 1.8 (ref 0.4–1.6)
ALP SERPL-CCNC: 91 U/L (ref 50–136)
ALT SERPL-CCNC: 46 U/L (ref 12–65)
AST SERPL-CCNC: 35 U/L (ref 15–37)
BILIRUB DIRECT SERPL-MCNC: 0.1 MG/DL
BILIRUB SERPL-MCNC: 0.4 MG/DL (ref 0.2–1.1)
CREAT UR-MCNC: 110 MG/DL
GLOBULIN SER CALC-MCNC: 2.6 G/DL (ref 2.8–4.5)
MICROALBUMIN UR-MCNC: 7.3 MG/DL
MICROALBUMIN/CREAT UR-RTO: 66 MG/G (ref 0–30)
NT PRO BNP: 409 PG/ML (ref 5–125)
PROT SERPL-MCNC: 7.2 G/DL (ref 6.3–8.2)
TSH W FREE THYROID IF ABNORMAL: 2.47 UIU/ML (ref 0.36–3.74)

## 2024-04-09 ENCOUNTER — TELEPHONE (OUTPATIENT)
Dept: FAMILY MEDICINE CLINIC | Facility: CLINIC | Age: 69
End: 2024-04-09

## 2024-04-09 NOTE — TELEPHONE ENCOUNTER
Who's Calling:  From Where: [facility] [place]    Message: calling to check on status of prior authorization for pt's Freestyle Ferdinand. Sent to us on 3/28.     Phone #: [phone]  Fax #:

## 2024-04-16 ENCOUNTER — TELEPHONE (OUTPATIENT)
Dept: FAMILY MEDICINE CLINIC | Facility: CLINIC | Age: 69
End: 2024-04-16

## 2024-04-16 NOTE — TELEPHONE ENCOUNTER
The appeal department called and said they will be sending over a fax for Dr squires for pt's Freestyle Sensor. If you have any questions call [phone]. They also gave a reference number which is [id number].

## 2024-04-22 RX ORDER — TRIAMCINOLONE ACETONIDE 1 MG/G
CREAM TOPICAL
Qty: 80 G | Refills: 2 | Status: SHIPPED | OUTPATIENT
Start: 2024-04-22

## 2024-05-09 RX ORDER — FENOFIBRATE 160 MG/1
160 TABLET ORAL DAILY
Qty: 90 TABLET | Refills: 3 | Status: SHIPPED | OUTPATIENT
Start: 2024-05-09

## 2024-05-22 RX ORDER — LEVOTHYROXINE SODIUM 175 UG/1
175 TABLET ORAL
Qty: 90 TABLET | Refills: 1 | Status: SHIPPED | OUTPATIENT
Start: 2024-05-22

## 2024-05-30 ENCOUNTER — PATIENT MESSAGE (OUTPATIENT)
Age: 69
End: 2024-05-30

## 2024-05-30 NOTE — TELEPHONE ENCOUNTER
From: Obinna Bunn  To: Dr. Junior Lomeli  Sent: 5/30/2024 8:42 AM EDT  Subject: Comprehensive Metabolic Panel    I have an upcoming visit with Dr. Junior Lomeli on June 6 at 9am. Is the panel needed or required at this time for this visit. Looks like it expires ion July. Please Advise.    ThanksBlanco

## 2024-06-06 ENCOUNTER — OFFICE VISIT (OUTPATIENT)
Age: 69
End: 2024-06-06
Payer: MEDICARE

## 2024-06-06 VITALS
SYSTOLIC BLOOD PRESSURE: 136 MMHG | BODY MASS INDEX: 31.56 KG/M2 | HEIGHT: 72 IN | WEIGHT: 233 LBS | DIASTOLIC BLOOD PRESSURE: 78 MMHG

## 2024-06-06 DIAGNOSIS — I10 ESSENTIAL HYPERTENSION: Primary | ICD-10-CM

## 2024-06-06 DIAGNOSIS — E13.9 DIABETES MELLITUS TYPE 1.5 (HCC): ICD-10-CM

## 2024-06-06 DIAGNOSIS — I25.118 CORONARY ARTERY DISEASE OF NATIVE ARTERY OF NATIVE HEART WITH STABLE ANGINA PECTORIS (HCC): Chronic | ICD-10-CM

## 2024-06-06 DIAGNOSIS — E78.00 PURE HYPERCHOLESTEROLEMIA: ICD-10-CM

## 2024-06-06 PROCEDURE — 3078F DIAST BP <80 MM HG: CPT | Performed by: INTERNAL MEDICINE

## 2024-06-06 PROCEDURE — 93000 ELECTROCARDIOGRAM COMPLETE: CPT | Performed by: INTERNAL MEDICINE

## 2024-06-06 PROCEDURE — 1123F ACP DISCUSS/DSCN MKR DOCD: CPT | Performed by: INTERNAL MEDICINE

## 2024-06-06 PROCEDURE — 99214 OFFICE O/P EST MOD 30 MIN: CPT | Performed by: INTERNAL MEDICINE

## 2024-06-06 PROCEDURE — 3075F SYST BP GE 130 - 139MM HG: CPT | Performed by: INTERNAL MEDICINE

## 2024-06-06 ASSESSMENT — ENCOUNTER SYMPTOMS
ABDOMINAL PAIN: 0
BACK PAIN: 0
COUGH: 0
SHORTNESS OF BREATH: 0

## 2024-06-06 NOTE — PROGRESS NOTES
[facility] CARDIOLOGY  [address] [place] [address], [address]  [place] [address]      24      NAME:  Obinna Bunn  : 1955  MRN: [id number]      SUBJECTIVE:   Obinna Bunn is a 68 y.o. male seen for a follow up visit regarding the following:     Chief Complaint   Patient presents with    Coronary Artery Disease    Hypertension    Hyperlipidemia       HPI:   68 y.o. male with history of remote CAD and PCI.  He presented to the hospital 2023 with delayed presentation inferolateral myocardial infarction complicated by VT/VF arrest.  Patient underwent CABG x4 with Impella assistance.    He has recovered well and active without CHF or angina.  Echo 2023:  EF 50-55%.            Past Medical History, Past Surgical History, Family history, Social History, and Medications were all reviewed with the patient today and updated as necessary.     Current Outpatient Medications   Medication Sig Dispense Refill    levothyroxine (SYNTHROID) 175 MCG tablet Take 1 tablet by mouth every morning (before breakfast) 90 tablet 1    fenofibrate (TRIGLIDE) 160 MG tablet Take 1 tablet by mouth daily 90 tablet 3    triamcinolone (KENALOG) 0.1 % cream Apply topically 2 times daily. 80 g 2    Tirzepatide (MOUNJARO) 7.5 MG/0.5ML SOPN SC injection       atorvastatin (LIPITOR) 80 MG tablet Take 1 tablet by mouth daily 90 tablet 1    allopurinol (ZYLOPRIM) 100 MG tablet Take 1 tablet by mouth daily 90 tablet 3    metoprolol tartrate (LOPRESSOR) 25 MG tablet Take 1 tablet by mouth 2 times daily 180 tablet 3    clopidogrel (PLAVIX) 75 MG tablet Take 1 tablet by mouth daily 90 tablet 3    nitroGLYCERIN (NITROSTAT) 0.4 MG SL tablet Use as directed for chest pain 25 tablet 3    acetaminophen (TYLENOL) 325 MG tablet Take 2 tablets by mouth every 6 hours as needed for Pain 120 tablet 3    Insulin Degludec 100 UNIT/ML SOPN Inject 52 Units into the skin nightly (Patient taking differently: Inject 72 Units into the skin nightly)

## 2024-06-14 RX ORDER — ATORVASTATIN CALCIUM 80 MG/1
80 TABLET, FILM COATED ORAL DAILY
Qty: 90 TABLET | Refills: 1 | Status: SHIPPED | OUTPATIENT
Start: 2024-06-14

## 2024-06-30 DIAGNOSIS — I25.10 CORONARY ARTERY DISEASE INVOLVING NATIVE CORONARY ARTERY OF NATIVE HEART WITHOUT ANGINA PECTORIS: ICD-10-CM

## 2024-07-01 RX ORDER — CLOPIDOGREL BISULFATE 75 MG/1
75 TABLET ORAL DAILY
Qty: 90 TABLET | Refills: 3 | Status: SHIPPED | OUTPATIENT
Start: 2024-07-01

## 2024-07-02 ENCOUNTER — PATIENT MESSAGE (OUTPATIENT)
Age: 69
End: 2024-07-02

## 2024-07-02 DIAGNOSIS — N17.9 AKI (ACUTE KIDNEY INJURY) (HCC): Primary | ICD-10-CM

## 2024-07-02 NOTE — TELEPHONE ENCOUNTER
From: Obinna Bunn  To: Dr. Gwen Lomeli  Sent: 7/2/2024 8:58 AM EDT  Subject: Obinna Bunn, Generic Plavix    Good Morning,    Sixteen months have passed since my CABG surgery. Everything I have been reading recommends staying one this med for a minimum of one year. Appears no benefit past the year gwen, clopidogrel (PLAVIX) 75 MG.    For myself the extended bleeding and bruising are rough. My hands look horrible.    So, at the end of the day, are you still recommending I stay on this med, or can I be removed from it without any detrimental effects.    Please Advise, Many Thanks in Advance,    Blanco

## 2024-07-08 NOTE — TELEPHONE ENCOUNTER
Patient ordered BMP at last office visit. Just had CMP 05/08/2024 Results below  Does patient need BMP drawn soon or before follow up visit in December    Comprehensive Metabolic Panel (CMP)  Order: [id number]  Component  Ref Range & Units 5/8/24 0904   Glucose  70 - 99 mg/dL 133 High    BUN  8 - 27 mg/dL 21   Creatinine  0.76 - 1.27 mg/dL 1.67 High    BUN/Creat Ratio  10 - 24 13   Sodium  134 - 144 mmol/L 140   Potassium  3.5 - 5.2 mmol/L 4.8   Chloride  96 - 106 mmol/L 103   CO2  20 - 29 mmol/L 21   Calcium  8.6 - 10.2 mg/dL 9.6   Protein, Total  6.0 - 8.5 g/dL 6.6   Albumin  3.9 - 4.9 g/dL 4.7   Globulin, Total  1.5 - 4.5 g/dL 1.9   A/G Ratio  1.2 - 2.2 2.5 High    Bilirubin, Total  0.0 - 1.2 mg/dL 0.4   Alkaline Phosphatase  44 - 121 IU/L 76   AST  0 - 40 IU/L 38   ALT  0 - 44 IU/L 32   eGFR  >59 mL/min/1.73 45 Low         Stable.

## 2024-07-08 NOTE — TELEPHONE ENCOUNTER
There is mixed data for patients regarding duration of clopidogrel.  Given his young age and significant coronary disease generally use clopidogrel for 10 years if possible.  If he is having extensive bruising he may discontinue clopidogrel but continue aspirin 81 mg daily.

## 2024-07-23 ENCOUNTER — LAB (OUTPATIENT)
Dept: FAMILY MEDICINE CLINIC | Facility: CLINIC | Age: 69
End: 2024-07-23

## 2024-07-23 DIAGNOSIS — E13.9 DIABETES MELLITUS TYPE 1.5 (HCC): ICD-10-CM

## 2024-07-23 DIAGNOSIS — E03.9 ACQUIRED HYPOTHYROIDISM: ICD-10-CM

## 2024-07-23 DIAGNOSIS — I10 ESSENTIAL HYPERTENSION: ICD-10-CM

## 2024-07-23 DIAGNOSIS — Z12.5 SCREENING FOR MALIGNANT NEOPLASM OF PROSTATE: ICD-10-CM

## 2024-07-23 DIAGNOSIS — Z00.00 ROUTINE GENERAL MEDICAL EXAMINATION AT A HEALTH CARE FACILITY: Primary | ICD-10-CM

## 2024-07-23 DIAGNOSIS — E78.2 MIXED HYPERLIPIDEMIA: ICD-10-CM

## 2024-07-23 DIAGNOSIS — Z00.00 ROUTINE GENERAL MEDICAL EXAMINATION AT A HEALTH CARE FACILITY: ICD-10-CM

## 2024-07-23 LAB
ALBUMIN SERPL-MCNC: 4.2 G/DL (ref 3.2–4.6)
ALBUMIN/GLOB SERPL: 1.6 (ref 1–1.9)
ALP SERPL-CCNC: 60 U/L (ref 40–129)
ALT SERPL-CCNC: 36 U/L (ref 12–65)
ANION GAP SERPL CALC-SCNC: 12 MMOL/L (ref 9–18)
APPEARANCE UR: CLEAR
AST SERPL-CCNC: 37 U/L (ref 15–37)
BACTERIA URNS QL MICRO: NEGATIVE /HPF
BASOPHILS # BLD: 0 K/UL (ref 0–0.2)
BASOPHILS NFR BLD: 0 % (ref 0–2)
BILIRUB SERPL-MCNC: 0.3 MG/DL (ref 0–1.2)
BILIRUB UR QL: NEGATIVE
BUN SERPL-MCNC: 22 MG/DL (ref 8–23)
CALCIUM SERPL-MCNC: 9.7 MG/DL (ref 8.8–10.2)
CASTS URNS QL MICRO: 0 /LPF
CHLORIDE SERPL-SCNC: 104 MMOL/L (ref 98–107)
CHOLEST SERPL-MCNC: 159 MG/DL (ref 0–200)
CO2 SERPL-SCNC: 23 MMOL/L (ref 20–28)
COLOR UR: NORMAL
CREAT SERPL-MCNC: 1.5 MG/DL (ref 0.8–1.3)
CRYSTALS URNS QL MICRO: 0 /LPF
DIFFERENTIAL METHOD BLD: ABNORMAL
EOSINOPHIL # BLD: 0.2 K/UL (ref 0–0.8)
EOSINOPHIL NFR BLD: 4 % (ref 0.5–7.8)
EPI CELLS #/AREA URNS HPF: NORMAL /HPF (ref 0–5)
ERYTHROCYTE [DISTWIDTH] IN BLOOD BY AUTOMATED COUNT: 14 % (ref 11.9–14.6)
GLOBULIN SER CALC-MCNC: 2.6 G/DL (ref 2.3–3.5)
GLUCOSE SERPL-MCNC: 95 MG/DL (ref 70–99)
GLUCOSE UR STRIP.AUTO-MCNC: NEGATIVE MG/DL
HCT VFR BLD AUTO: 39.7 % (ref 41.1–50.3)
HDLC SERPL-MCNC: 41 MG/DL (ref 40–60)
HDLC SERPL: 3.9 (ref 0–5)
HGB BLD-MCNC: 12.8 G/DL (ref 13.6–17.2)
HGB UR QL STRIP: NEGATIVE
HYALINE CASTS URNS QL MICRO: NORMAL /LPF
IMM GRANULOCYTES # BLD AUTO: 0 K/UL (ref 0–0.5)
IMM GRANULOCYTES NFR BLD AUTO: 0 % (ref 0–5)
KETONES UR QL STRIP.AUTO: NEGATIVE MG/DL
LDLC SERPL CALC-MCNC: 84 MG/DL (ref 0–100)
LEUKOCYTE ESTERASE UR QL STRIP.AUTO: NEGATIVE
LYMPHOCYTES # BLD: 0.7 K/UL (ref 0.5–4.6)
LYMPHOCYTES NFR BLD: 16 % (ref 13–44)
MCH RBC QN AUTO: 29.2 PG (ref 26.1–32.9)
MCHC RBC AUTO-ENTMCNC: 32.2 G/DL (ref 31.4–35)
MCV RBC AUTO: 90.6 FL (ref 82–102)
MONOCYTES # BLD: 0.4 K/UL (ref 0.1–1.3)
MONOCYTES NFR BLD: 10 % (ref 4–12)
MUCOUS THREADS URNS QL MICRO: 0 /LPF
NEUTS SEG # BLD: 3.2 K/UL (ref 1.7–8.2)
NEUTS SEG NFR BLD: 70 % (ref 43–78)
NITRITE UR QL STRIP.AUTO: NEGATIVE
NRBC # BLD: 0 K/UL (ref 0–0.2)
PH UR STRIP: 7 (ref 5–9)
PLATELET # BLD AUTO: 224 K/UL (ref 150–450)
PMV BLD AUTO: 10.5 FL (ref 9.4–12.3)
POTASSIUM SERPL-SCNC: 4.5 MMOL/L (ref 3.5–5.1)
PROT SERPL-MCNC: 6.8 G/DL (ref 6.3–8.2)
PROT UR STRIP-MCNC: NEGATIVE MG/DL
PSA SERPL-MCNC: 0.4 NG/ML (ref 0–4)
RBC # BLD AUTO: 4.38 M/UL (ref 4.23–5.6)
RBC #/AREA URNS HPF: NORMAL /HPF (ref 0–5)
SODIUM SERPL-SCNC: 139 MMOL/L (ref 136–145)
SP GR UR REFRACTOMETRY: 1 (ref 1–1.02)
TRIGL SERPL-MCNC: 171 MG/DL (ref 0–150)
TSH W FREE THYROID IF ABNORMAL: 4.19 UIU/ML (ref 0.27–4.2)
URINE CULTURE IF INDICATED: NORMAL
UROBILINOGEN UR QL STRIP.AUTO: 0.2 EU/DL (ref 0.2–1)
VLDLC SERPL CALC-MCNC: 34 MG/DL (ref 6–23)
WBC # BLD AUTO: 4.5 K/UL (ref 4.3–11.1)
WBC URNS QL MICRO: NORMAL /HPF (ref 0–4)

## 2024-07-24 DIAGNOSIS — I10 ESSENTIAL HYPERTENSION: ICD-10-CM

## 2024-07-24 SDOH — HEALTH STABILITY: PHYSICAL HEALTH: ON AVERAGE, HOW MANY MINUTES DO YOU ENGAGE IN EXERCISE AT THIS LEVEL?: 50 MIN

## 2024-07-24 SDOH — HEALTH STABILITY: PHYSICAL HEALTH: ON AVERAGE, HOW MANY DAYS PER WEEK DO YOU ENGAGE IN MODERATE TO STRENUOUS EXERCISE (LIKE A BRISK WALK)?: 3 DAYS

## 2024-07-24 ASSESSMENT — PATIENT HEALTH QUESTIONNAIRE - PHQ9
SUM OF ALL RESPONSES TO PHQ QUESTIONS 1-9: 0
SUM OF ALL RESPONSES TO PHQ9 QUESTIONS 1 & 2: 0
1. LITTLE INTEREST OR PLEASURE IN DOING THINGS: NOT AT ALL
SUM OF ALL RESPONSES TO PHQ QUESTIONS 1-9: 0
2. FEELING DOWN, DEPRESSED OR HOPELESS: NOT AT ALL

## 2024-07-24 ASSESSMENT — LIFESTYLE VARIABLES
HOW MANY STANDARD DRINKS CONTAINING ALCOHOL DO YOU HAVE ON A TYPICAL DAY: 1
HOW MANY STANDARD DRINKS CONTAINING ALCOHOL DO YOU HAVE ON A TYPICAL DAY: 1 OR 2
HOW OFTEN DO YOU HAVE SIX OR MORE DRINKS ON ONE OCCASION: 1
HOW OFTEN DO YOU HAVE A DRINK CONTAINING ALCOHOL: 2-4 TIMES A MONTH
HOW OFTEN DO YOU HAVE A DRINK CONTAINING ALCOHOL: 3

## 2024-08-01 RX ORDER — ALLOPURINOL 100 MG/1
100 TABLET ORAL DAILY
Qty: 90 TABLET | Refills: 3 | Status: SHIPPED | OUTPATIENT
Start: 2024-08-01

## 2024-08-04 SDOH — ECONOMIC STABILITY: FOOD INSECURITY: WITHIN THE PAST 12 MONTHS, YOU WORRIED THAT YOUR FOOD WOULD RUN OUT BEFORE YOU GOT MONEY TO BUY MORE.: PATIENT DECLINED

## 2024-08-04 SDOH — ECONOMIC STABILITY: TRANSPORTATION INSECURITY
IN THE PAST 12 MONTHS, HAS LACK OF TRANSPORTATION KEPT YOU FROM MEETINGS, WORK, OR FROM GETTING THINGS NEEDED FOR DAILY LIVING?: PATIENT DECLINED

## 2024-08-04 SDOH — ECONOMIC STABILITY: HOUSING INSECURITY
IN THE LAST 12 MONTHS, WAS THERE A TIME WHEN YOU DID NOT HAVE A STEADY PLACE TO SLEEP OR SLEPT IN A SHELTER (INCLUDING NOW)?: PATIENT DECLINED

## 2024-08-04 SDOH — ECONOMIC STABILITY: INCOME INSECURITY: HOW HARD IS IT FOR YOU TO PAY FOR THE VERY BASICS LIKE FOOD, HOUSING, MEDICAL CARE, AND HEATING?: NOT HARD AT ALL

## 2024-08-04 SDOH — ECONOMIC STABILITY: FOOD INSECURITY: WITHIN THE PAST 12 MONTHS, THE FOOD YOU BOUGHT JUST DIDN'T LAST AND YOU DIDN'T HAVE MONEY TO GET MORE.: PATIENT DECLINED

## 2024-08-07 ENCOUNTER — OFFICE VISIT (OUTPATIENT)
Dept: FAMILY MEDICINE CLINIC | Facility: CLINIC | Age: 69
End: 2024-08-07
Payer: MEDICARE

## 2024-08-07 VITALS
DIASTOLIC BLOOD PRESSURE: 78 MMHG | WEIGHT: 233 LBS | SYSTOLIC BLOOD PRESSURE: 122 MMHG | OXYGEN SATURATION: 96 % | HEART RATE: 67 BPM | TEMPERATURE: 97.8 F | HEIGHT: 72 IN | BODY MASS INDEX: 31.56 KG/M2

## 2024-08-07 DIAGNOSIS — D64.9 ANEMIA, UNSPECIFIED TYPE: ICD-10-CM

## 2024-08-07 DIAGNOSIS — R25.2 MUSCLE CRAMPS: ICD-10-CM

## 2024-08-07 DIAGNOSIS — Z23 NEED FOR PNEUMOCOCCAL 20-VALENT CONJUGATE VACCINATION: ICD-10-CM

## 2024-08-07 DIAGNOSIS — D53.9 DEFICIENCY ANEMIA: ICD-10-CM

## 2024-08-07 DIAGNOSIS — M25.50 ARTHRALGIA, UNSPECIFIED JOINT: ICD-10-CM

## 2024-08-07 DIAGNOSIS — N18.32 STAGE 3B CHRONIC KIDNEY DISEASE (HCC): ICD-10-CM

## 2024-08-07 DIAGNOSIS — Z00.00 WELCOME TO MEDICARE PREVENTIVE VISIT: Primary | ICD-10-CM

## 2024-08-07 DIAGNOSIS — Z95.1 S/P CABG X 4: ICD-10-CM

## 2024-08-07 DIAGNOSIS — E03.9 ACQUIRED HYPOTHYROIDISM: ICD-10-CM

## 2024-08-07 DIAGNOSIS — I25.10 CORONARY ARTERY DISEASE INVOLVING NATIVE CORONARY ARTERY OF NATIVE HEART WITHOUT ANGINA PECTORIS: ICD-10-CM

## 2024-08-07 DIAGNOSIS — I10 ESSENTIAL HYPERTENSION: ICD-10-CM

## 2024-08-07 LAB
BASOPHILS # BLD: 0 K/UL (ref 0–0.2)
BASOPHILS NFR BLD: 1 % (ref 0–2)
DIFFERENTIAL METHOD BLD: ABNORMAL
EOSINOPHIL # BLD: 0.2 K/UL (ref 0–0.8)
EOSINOPHIL NFR BLD: 4 % (ref 0.5–7.8)
ERYTHROCYTE [DISTWIDTH] IN BLOOD BY AUTOMATED COUNT: 14.1 % (ref 11.9–14.6)
FERRITIN SERPL-MCNC: 41 NG/ML (ref 8–388)
HCT VFR BLD AUTO: 42 % (ref 41.1–50.3)
HGB BLD-MCNC: 13.5 G/DL (ref 13.6–17.2)
IMM GRANULOCYTES # BLD AUTO: 0 K/UL (ref 0–0.5)
IMM GRANULOCYTES NFR BLD AUTO: 0 % (ref 0–5)
IRON SERPL-MCNC: 75 UG/DL (ref 35–100)
LYMPHOCYTES # BLD: 1 K/UL (ref 0.5–4.6)
LYMPHOCYTES NFR BLD: 20 % (ref 13–44)
MAGNESIUM SERPL-MCNC: 2.1 MG/DL (ref 1.8–2.4)
MCH RBC QN AUTO: 29.6 PG (ref 26.1–32.9)
MCHC RBC AUTO-ENTMCNC: 32.1 G/DL (ref 31.4–35)
MONOCYTES # BLD: 0.6 K/UL (ref 0.1–1.3)
MONOCYTES NFR BLD: 11 % (ref 4–12)
NEUTS SEG # BLD: 3.4 K/UL (ref 1.7–8.2)
NEUTS SEG NFR BLD: 64 % (ref 43–78)
NRBC # BLD: 0 K/UL (ref 0–0.2)
PLATELET # BLD AUTO: 218 K/UL (ref 150–450)
PMV BLD AUTO: 10.5 FL (ref 9.4–12.3)
RBC # BLD AUTO: 4.56 M/UL (ref 4.23–5.6)
VIT B12 SERPL-MCNC: 388 PG/ML (ref 193–986)
WBC # BLD AUTO: 5.3 K/UL (ref 4.3–11.1)

## 2024-08-07 PROCEDURE — 3078F DIAST BP <80 MM HG: CPT | Performed by: FAMILY MEDICINE

## 2024-08-07 PROCEDURE — 1123F ACP DISCUSS/DSCN MKR DOCD: CPT | Performed by: FAMILY MEDICINE

## 2024-08-07 PROCEDURE — G0009 ADMIN PNEUMOCOCCAL VACCINE: HCPCS | Performed by: FAMILY MEDICINE

## 2024-08-07 PROCEDURE — G0402 INITIAL PREVENTIVE EXAM: HCPCS | Performed by: FAMILY MEDICINE

## 2024-08-07 PROCEDURE — 3074F SYST BP LT 130 MM HG: CPT | Performed by: FAMILY MEDICINE

## 2024-08-07 PROCEDURE — 93000 ELECTROCARDIOGRAM COMPLETE: CPT | Performed by: FAMILY MEDICINE

## 2024-08-07 PROCEDURE — 90677 PCV20 VACCINE IM: CPT | Performed by: FAMILY MEDICINE

## 2024-08-07 NOTE — PROGRESS NOTES
Medicare Annual Wellness Visit    Obinna Bunn is here for Welcome to Medicare and Other (Stopped Plavix d/t bruising, still taking asa 81)    Assessment & Plan   Welcome to Medicare preventive visit  -     EKG 12 Lead  Essential hypertension  -     EKG 12 Lead  Coronary artery disease involving native coronary artery of native heart without angina pectoris  -     EKG 12 Lead  S/P CABG x 4  Anemia, unspecified type  Stage 3b chronic kidney disease (HCC)  Deficiency anemia  -     Iron; Future  -     Ferritin; Future  -     CBC with Auto Differential; Future  -     Vitamin B12; Future  -     [facility]  Arthralgia, unspecified joint  -     ЮЛИЯ, Direct, w/Reflex; Future  -     Rheumatoid Factor; Future  Acquired hypothyroidism  Muscle cramps  -     Magnesium; Future  Need for pneumococcal 20-valent conjugate vaccination  -     Pneumococcal, PCV20, PREVNAR 20, (age 6w+), IM, PF  Await labs, GI eval  Cr improving; avoid salt, hydrate well  Recheck in 3 mo  Cont cardiology and endo  f/u    EKG with NS QRS widening; nsr; nl rate; no change from prior EKG  Recommendations for Preventive Services Due: see orders and patient instructions/AVS.    Recommended screening schedule for the next 5-10 years is provided to the patient in written form: see Patient Instructions/AVS.     Return in about 3 months (around 11/7/2024) for recheck.     Subjective   The following acute and/or chronic problems were also addressed today:  Pt with CAD s/p CABG, DM, CKD with recent issues with swelling.  Improved with limiting salt.  No CP. No sob.  Seeing endo. Last A1c 7.0.  C-peptide <0.1. Cr 1.5, improving. Hgb 12.8 - stable. No blood in stool.  Colonoscopy 2017 in chart  but pt does not remember having.  R hand cramping and cannot make full fist.  Started while on diuretic.     Patient's complete Health Risk Assessment and screening values have been reviewed and are found in Flowsheets. The

## 2024-08-07 NOTE — PATIENT INSTRUCTIONS
Preventive Plan for Obinna Bunn - 8/7/2024  Medicare offers a range of preventive health benefits. Some of the tests and screenings are paid in full while other may be subject to a deductible, co-insurance, and/or copay.    Some of these benefits include a comprehensive review of your medical history including lifestyle, illnesses that may run in your family, and various assessments and screenings as appropriate.    After reviewing your medical record and screening and assessments performed today your provider may have ordered immunizations, labs, imaging, and/or referrals for you.  A list of these orders (if applicable) as well as your Preventive Care list are included within your After Visit Summary for your review.    Other Preventive Recommendations:    A preventive eye exam performed by an eye specialist is recommended every 1-2 years to screen for glaucoma; cataracts, macular degeneration, and other eye disorders.  A preventive dental visit is recommended every 6 months.  Try to get at least 150 minutes of exercise per week or 10,000 steps per day on a pedometer .  Order or download the FREE \"Exercise & Physical Activity: Your Everyday Guide\" from [employer]. Call 1-537.846.4790 or search [employer] online.  You need 7517-3336 mg of calcium and 9463-3477 IU of vitamin D per day. It is possible to meet your calcium requirement with diet alone, but a vitamin D supplement is usually necessary to meet this goal.  When exposed to the sun, use a sunscreen that protects against both UVA and UVB radiation with an SPF of 30 or greater. Reapply every 2 to 3 hours or after sweating, drying off with a towel, or swimming.  Always wear a seat belt when traveling in a car. Always wear a helmet when riding a bicycle or motorcycle.

## 2024-08-08 LAB — RHEUMATOID FACT SER QL LA: NEGATIVE

## 2024-08-09 DIAGNOSIS — R76.8 POSITIVE ANA (ANTINUCLEAR ANTIBODY): Primary | ICD-10-CM

## 2024-08-09 LAB
ANA SER QL: POSITIVE
CENTROMERE B AB SER-ACNC: <0.2 AI (ref 0–0.9)
CHROMATIN AB SERPL-ACNC: <0.2 AI (ref 0–0.9)
DSDNA AB SER-ACNC: <1 IU/ML (ref 0–9)
ENA JO1 AB SER-ACNC: <0.2 AI (ref 0–0.9)
ENA RNP AB SER-ACNC: 1.8 AI (ref 0–0.9)
ENA SCL70 AB SER-ACNC: <0.2 AI (ref 0–0.9)
ENA SM AB SER-ACNC: <0.2 AI (ref 0–0.9)
ENA SS-A AB SER-ACNC: <0.2 AI (ref 0–0.9)
ENA SS-B AB SER-ACNC: <0.2 AI (ref 0–0.9)
Lab: ABNORMAL

## 2024-09-15 ASSESSMENT — RHEUMATOLOGY NEW PATIENT QUESTIONNAIRE
ABNORMAL URINE: N
UNUSUAL BLEEDING: N
DIFFICULTY BREATHING LYING DOWN: N
DIFFICULTY SWALLOWING: N
SKIN TIGHTNESS: N
AGITATION: N
COUGH: N
UNUSUALLY RAPID OR SLOWED HEART RATE: N
HOARSE VOICE: N
BLACK STOOLS: N
SUN SENSITIVE (SUN ALLERGY): N
NODULES/BUMPS: N
ANEMIA: N
DIFFICULTY STAYING ASLEEP: N
EASILY LOSING TEMPER: N
COLOR CHANGES OF HANDS OR FEET IN THE COLD: N
DEPRESSION: N
MEMORY LOSS: N
SHORTNESS OF BREATH: N
RASH: N
EASY BRUISING: Y
NAUSEA: N
CHEST PAIN: N
RASH OR ULCERS: N
STOMACH PAIN: N
NUMBNESS OR TINGLING IN HANDS OR FEET: N
EXCESSIVE HAIR LOSS (MORE THAN YOUR NORM): N
INCREASED SUSCEPTIBILITY TO INFECTION: N
PERSISTENT DIARRHEA: N
HEADACHES: N
MUSCLE WEAKNESS: N
SWOLLEN LEGS OR FEET: Y
SKIN REDNESS: N
DIFFICULTY FALLING ASLEEP: N
VOMITING OF BLOOD OR COFFEE GROUND CONSISTENCY MATERIAL: N
SEIZURES: N
FAINTING: N
ANXIETY: N
SWOLLEN OR TENDER GLANDS: N
JOINT SWELLING: N
HEARTBURN OR REFLUX: N
UNEXPLAINED HEARING LOSS: N
JAUNDICE: N
SORES IN MOUTH OR NOSE: N
BEHAVIORAL CHANGES: N
JOINT PAIN: Y
DRYNESS OF MOUTH: N
MORNING STIFFNESS: N
LOSS OF CONSCIOUSNESS: N
BLOOD IN STOOLS: N
PAIN OR BURNING ON URINATION: N

## 2024-09-17 ENCOUNTER — OFFICE VISIT (OUTPATIENT)
Dept: RHEUMATOLOGY | Age: 69
End: 2024-09-17
Payer: MEDICARE

## 2024-09-17 ENCOUNTER — HOSPITAL ENCOUNTER (OUTPATIENT)
Dept: GENERAL RADIOLOGY | Age: 69
Discharge: HOME | End: 2024-09-20
Payer: MEDICARE

## 2024-09-17 VITALS
HEART RATE: 65 BPM | TEMPERATURE: 97.4 F | WEIGHT: 224 LBS | HEIGHT: 72 IN | BODY MASS INDEX: 30.34 KG/M2 | DIASTOLIC BLOOD PRESSURE: 87 MMHG | SYSTOLIC BLOOD PRESSURE: 159 MMHG

## 2024-09-17 DIAGNOSIS — M25.50 ARTHRALGIA, UNSPECIFIED JOINT: ICD-10-CM

## 2024-09-17 DIAGNOSIS — R76.8 POSITIVE ANA (ANTINUCLEAR ANTIBODY): ICD-10-CM

## 2024-09-17 DIAGNOSIS — R76.8 POSITIVE ANA (ANTINUCLEAR ANTIBODY): Primary | ICD-10-CM

## 2024-09-17 DIAGNOSIS — R01.1 HEART MURMUR: ICD-10-CM

## 2024-09-17 DIAGNOSIS — M65.352 TRIGGER LITTLE FINGER OF LEFT HAND: ICD-10-CM

## 2024-09-17 DIAGNOSIS — N18.30 STAGE 3 CHRONIC KIDNEY DISEASE, UNSPECIFIED WHETHER STAGE 3A OR 3B CKD (HCC): ICD-10-CM

## 2024-09-17 PROBLEM — E11.21 TYPE 2 DIABETES MELLITUS WITH DIABETIC NEPHROPATHY (HCC): Status: ACTIVE | Noted: 2018-03-02

## 2024-09-17 LAB
APPEARANCE UR: CLEAR
BACTERIA URNS QL MICRO: NEGATIVE /HPF
BASOPHILS # BLD: 0 K/UL (ref 0–0.2)
BASOPHILS NFR BLD: 1 % (ref 0–2)
BILIRUB UR QL: NEGATIVE
COLOR UR: ABNORMAL
CREAT UR-MCNC: 87.4 MG/DL (ref 39–259)
DIFFERENTIAL METHOD BLD: ABNORMAL
EOSINOPHIL # BLD: 0.2 K/UL (ref 0–0.8)
EOSINOPHIL NFR BLD: 4 % (ref 0.5–7.8)
EPI CELLS #/AREA URNS HPF: ABNORMAL /HPF (ref 0–5)
ERYTHROCYTE [DISTWIDTH] IN BLOOD BY AUTOMATED COUNT: 13.8 % (ref 11.9–14.6)
ERYTHROCYTE [SEDIMENTATION RATE] IN BLOOD: 7 MM/HR
GLUCOSE UR STRIP.AUTO-MCNC: >1000 MG/DL
HCT VFR BLD AUTO: 39.7 % (ref 41.1–50.3)
HGB BLD-MCNC: 13.2 G/DL (ref 13.6–17.2)
HGB UR QL STRIP: NEGATIVE
HYALINE CASTS URNS QL MICRO: ABNORMAL /LPF
IMM GRANULOCYTES # BLD AUTO: 0 K/UL (ref 0–0.5)
IMM GRANULOCYTES NFR BLD AUTO: 0 % (ref 0–5)
KETONES UR QL STRIP.AUTO: NEGATIVE MG/DL
LEUKOCYTE ESTERASE UR QL STRIP.AUTO: NEGATIVE
LYMPHOCYTES # BLD: 1 K/UL (ref 0.5–4.6)
LYMPHOCYTES NFR BLD: 19 % (ref 13–44)
MCH RBC QN AUTO: 30.6 PG (ref 26.1–32.9)
MCHC RBC AUTO-ENTMCNC: 33.2 G/DL (ref 31.4–35)
MCV RBC AUTO: 92.1 FL (ref 82–102)
MONOCYTES # BLD: 0.4 K/UL (ref 0.1–1.3)
MONOCYTES NFR BLD: 9 % (ref 4–12)
NEUTS SEG # BLD: 3.3 K/UL (ref 1.7–8.2)
NEUTS SEG NFR BLD: 67 % (ref 43–78)
NITRITE UR QL STRIP.AUTO: NEGATIVE
NRBC # BLD: 0 K/UL (ref 0–0.2)
PH UR STRIP: 6 (ref 5–9)
PLATELET # BLD AUTO: 210 K/UL (ref 150–450)
PMV BLD AUTO: 10.9 FL (ref 9.4–12.3)
PROT UR STRIP-MCNC: ABNORMAL MG/DL
PROT UR-MCNC: 16 MG/DL
PROT/CREAT UR-RTO: 0.2
RBC # BLD AUTO: 4.31 M/UL (ref 4.23–5.6)
RBC #/AREA URNS HPF: ABNORMAL /HPF (ref 0–5)
SP GR UR REFRACTOMETRY: 1.02 (ref 1–1.02)
UROBILINOGEN UR QL STRIP.AUTO: 0.2 EU/DL (ref 0.2–1)
WBC # BLD AUTO: 4.9 K/UL (ref 4.3–11.1)
WBC URNS QL MICRO: ABNORMAL /HPF (ref 0–4)

## 2024-09-17 PROCEDURE — 3079F DIAST BP 80-89 MM HG: CPT | Performed by: INTERNAL MEDICINE

## 2024-09-17 PROCEDURE — 99205 OFFICE O/P NEW HI 60 MIN: CPT | Performed by: INTERNAL MEDICINE

## 2024-09-17 PROCEDURE — 73130 X-RAY EXAM OF HAND: CPT

## 2024-09-17 PROCEDURE — 3077F SYST BP >= 140 MM HG: CPT | Performed by: INTERNAL MEDICINE

## 2024-09-17 PROCEDURE — 1123F ACP DISCUSS/DSCN MKR DOCD: CPT | Performed by: INTERNAL MEDICINE

## 2024-09-18 ENCOUNTER — TELEPHONE (OUTPATIENT)
Age: 69
End: 2024-09-18

## 2024-09-18 DIAGNOSIS — R01.1 HEART MURMUR: Primary | ICD-10-CM

## 2024-09-18 LAB
ALBUMIN SERPL-MCNC: 4.1 G/DL (ref 3.2–4.6)
ALBUMIN/GLOB SERPL: 1.5 (ref 1–1.9)
ALP SERPL-CCNC: 67 U/L (ref 40–129)
ALT SERPL-CCNC: 45 U/L (ref 12–65)
ANION GAP SERPL CALC-SCNC: 11 MMOL/L (ref 9–18)
AST SERPL-CCNC: 49 U/L (ref 15–37)
BILIRUB SERPL-MCNC: 0.3 MG/DL (ref 0–1.2)
BUN SERPL-MCNC: 20 MG/DL (ref 8–23)
CALCIUM SERPL-MCNC: 9.9 MG/DL (ref 8.8–10.2)
CHLORIDE SERPL-SCNC: 101 MMOL/L (ref 98–107)
CO2 SERPL-SCNC: 24 MMOL/L (ref 20–28)
CREAT SERPL-MCNC: 1.45 MG/DL (ref 0.8–1.3)
GLOBULIN SER CALC-MCNC: 2.7 G/DL (ref 2.3–3.5)
GLUCOSE SERPL-MCNC: 286 MG/DL (ref 70–99)
POTASSIUM SERPL-SCNC: 4.9 MMOL/L (ref 3.5–5.1)
PROT SERPL-MCNC: 6.8 G/DL (ref 6.3–8.2)
SODIUM SERPL-SCNC: 137 MMOL/L (ref 136–145)

## 2024-09-19 LAB
CCP IGA+IGG SERPL IA-ACNC: 8 UNITS (ref 0–19)
THYROPEROXIDASE AB SERPL-ACNC: 10 IU/ML (ref 0–34)

## 2024-09-20 LAB
C3 SERPL-MCNC: 149 MG/DL (ref 82–167)
C4 SERPL-MCNC: 24 MG/DL (ref 12–38)
CRP SERPL-MCNC: 2 MG/L (ref 0–10)

## 2024-09-25 LAB
ANA BY IFA: POSITIVE
Lab: ABNORMAL
SPECKLED PATTERN: ABNORMAL

## 2024-10-15 ENCOUNTER — TELEMEDICINE (OUTPATIENT)
Dept: RHEUMATOLOGY | Age: 69
End: 2024-10-15
Payer: MEDICARE

## 2024-10-15 DIAGNOSIS — R76.8 POSITIVE ANA (ANTINUCLEAR ANTIBODY): ICD-10-CM

## 2024-10-15 DIAGNOSIS — M06.4 UNDIFFERENTIATED INFLAMMATORY ARTHRITIS (HCC): Primary | ICD-10-CM

## 2024-10-15 DIAGNOSIS — Z79.899 LONG-TERM USE OF HIGH-RISK MEDICATION: ICD-10-CM

## 2024-10-15 DIAGNOSIS — M65.352 TRIGGER LITTLE FINGER OF LEFT HAND: ICD-10-CM

## 2024-10-15 PROBLEM — M25.50 ARTHRALGIA: Status: RESOLVED | Noted: 2024-09-17 | Resolved: 2024-10-15

## 2024-10-15 PROBLEM — M19.90 UNDIFFERENTIATED INFLAMMATORY ARTHRITIS: Status: ACTIVE | Noted: 2024-10-15

## 2024-10-15 PROCEDURE — 1123F ACP DISCUSS/DSCN MKR DOCD: CPT | Performed by: INTERNAL MEDICINE

## 2024-10-15 PROCEDURE — G2211 COMPLEX E/M VISIT ADD ON: HCPCS | Performed by: INTERNAL MEDICINE

## 2024-10-15 PROCEDURE — 99214 OFFICE O/P EST MOD 30 MIN: CPT | Performed by: INTERNAL MEDICINE

## 2024-10-15 RX ORDER — HYDROXYCHLOROQUINE SULFATE 200 MG/1
TABLET, FILM COATED ORAL
Qty: 194 TABLET | Refills: 0 | Status: SHIPPED | OUTPATIENT
Start: 2024-10-15 | End: 2025-01-27

## 2024-10-15 NOTE — PATIENT INSTRUCTIONS
You have inflammatory arthritis affecting the hands  Advised patient to start plaquenil 1 tab for 2 weeks, then increase to 1 tab twice daily to reach goal dose. Referral to ophthalmology for screening.  Side effects including dizziness, headaches, GI intolerance, retinal toxicity, skin rash discussed with patient.  Repeat labs 1/1/2025 in the system ([facility] building [address] room 150  Notify us for any medication refills, side affects, or worsening joint pain/swelling/stiffness  Return f/u in 4/2025

## 2024-10-15 NOTE — ASSESSMENT & PLAN NOTE
Positive low titer RNP 1.8  Negative ЮЛИЯ 1: 40 speckled, CCP, TPO AB, NEHEMIAH panel is negative and rheumatoid factor   Creatinine 1.45 at baseline, AST 49 from 37, rest of CMP normal  Normocytic anemia 13.2 at baseline, normal WBC and PLT  Normal ESR, CRP, C3, C4, UPCR, UA with trace protein no blood     The patient does not have Raynaud's or abnormal nailfold capillaries or other stigmata of mixed connective tissue disease. Low titer ЮЛИЯ is not consistent with this diagnosis. Suspect these are false positive or not clinically significant.       Plan:  -no need to further evaluate

## 2024-10-15 NOTE — ASSESSMENT & PLAN NOTE
Dx 10/2024  Manifestations: right hand morning stiffness lasting 1 hour, cannot make full fist since 3/2024  Negative ЮЛИЯ 1: 40 speckled, rheumatoid factor, CCP, NEHEMIAH panel, TPO AB  He has multiple families with rheumatoid arthritis including his sister, mom, maternal aunt.  XR bilateral hands 9/2024: Possible erosions of L 3rd DIP joint, R 3rd DIP joint, R 2nd PIP joint, and R 2nd MCP joint     Will start HCQ for inflammatory arthritis. Patient has diabetic retinopathy, I notified his eye clinic [facility] in [place] that I am planning to start him on this medication.      Plan:  -Advised patient to start plaquenil 1 tab for 2 weeks, then increase to 1 tab twice daily to reach goal dose. Referral to ophthalmology for screening.  Side effects including dizziness, headaches, GI intolerance, retinal toxicity, skin rash discussed with patient  -Repeat labs 1/1/2025   -continue tylenol extra strength 1000mg once or twice a day as needed  -continue Voltaren gel/diclofenac as needed sparingly, avoid use of systemic NSAIDs such as advil, motrin, aleve, ibuprofen because of kidney disease  -F/u in 6 months     Orders:    hydroxychloroquine (PLAQUENIL) 200 MG tablet; Take 1 tablet by mouth daily for 14 days, THEN 2 tablets daily.    Sedimentation Rate; Standing    C-Reactive Protein; Standing    Comprehensive Metabolic Panel; Standing    CBC with Auto Differential; Standing

## 2024-10-15 NOTE — ASSESSMENT & PLAN NOTE
Orders:    hydroxychloroquine (PLAQUENIL) 200 MG tablet; Take 1 tablet by mouth daily for 14 days, THEN 2 tablets daily.    Sedimentation Rate; Standing    C-Reactive Protein; Standing    Comprehensive Metabolic Panel; Standing    CBC with Auto Differential; Standing

## 2024-10-15 NOTE — PROGRESS NOTES
Obinna Bunn, was evaluated through a synchronous (real-time) audio-video encounter. The patient (or guardian if applicable) is aware that this is a billable service, which includes applicable co-pays. This Virtual Visit was conducted with patient's (and/or legal guardian's) consent. Patient identification was verified, and a caregiver was present when appropriate.   The patient was located at Home: [address]  [place] [address]  Provider was located at Facility (Appt Dept): [address]  [place] [address]  Confirm you are appropriately licensed, registered, or certified to deliver care in the state where the patient is located as indicated above. If you are not or unsure, please re-schedule the visit: Yes, I confirm.     Obinna Bunn (:  1955) is a Established patient, presenting virtually for evaluation of the following:      Below is the assessment and plan developed based on review of pertinent history, physical exam, labs, studies, and medications.     Assessment & Plan  Undifferentiated inflammatory arthritis (HCC)   Dx 10/2024  Manifestations: right hand morning stiffness lasting 1 hour, cannot make full fist since 3/2024  Negative ЮЛИЯ 1: 40 speckled, rheumatoid factor, CCP, NEHEMIAH panel, TPO AB  He has multiple families with rheumatoid arthritis including his sister, mom, maternal aunt.  XR bilateral hands 2024: Possible erosions of L 3rd DIP joint, R 3rd DIP joint, R 2nd PIP joint, and R 2nd MCP joint     Will start HCQ for inflammatory arthritis. Patient has diabetic retinopathy, I notified his eye clinic [facility] in [place] that I am planning to start him on this medication.      Plan:  -Advised patient to start plaquenil 1 tab for 2 weeks, then increase to 1 tab twice daily to reach goal dose. Referral to ophthalmology for screening.  Side effects including dizziness, headaches, GI intolerance, retinal

## 2024-10-15 NOTE — ASSESSMENT & PLAN NOTE
Active triggering on exam. Refractory to oval eight splint.     Plan:  -referred to ortho hand for corticosteroid injection    Orders:    [facility]

## 2024-10-30 ENCOUNTER — OFFICE VISIT (OUTPATIENT)
Age: 69
End: 2024-10-30

## 2024-10-30 DIAGNOSIS — M19.90 INFLAMMATORY ARTHROPATHY: ICD-10-CM

## 2024-10-30 DIAGNOSIS — M65.352 ACQUIRED TRIGGER FINGER OF LEFT LITTLE FINGER: Primary | ICD-10-CM

## 2024-10-30 RX ORDER — BETAMETHASONE SODIUM PHOSPHATE AND BETAMETHASONE ACETATE 3; 3 MG/ML; MG/ML
6 INJECTION, SUSPENSION INTRA-ARTICULAR; INTRALESIONAL; INTRAMUSCULAR; SOFT TISSUE ONCE
Status: COMPLETED | OUTPATIENT
Start: 2024-10-30 | End: 2024-10-30

## 2024-10-30 RX ORDER — MELOXICAM 15 MG/1
15 TABLET ORAL DAILY
Qty: 45 TABLET | Refills: 0 | Status: SHIPPED | OUTPATIENT
Start: 2024-10-30 | End: 2024-12-14

## 2024-10-30 RX ADMIN — BETAMETHASONE SODIUM PHOSPHATE AND BETAMETHASONE ACETATE 6 MG: 3; 3 INJECTION, SUSPENSION INTRA-ARTICULAR; INTRALESIONAL; INTRAMUSCULAR; SOFT TISSUE at 09:54

## 2024-10-30 NOTE — PROGRESS NOTES
Orthopaedic Hand Surgery Note    Name: Obinna Bunn  Age: 68 y.o.  YOB: 1955  Gender: male  MRN: [id number]    CC: New patient referred for hand numbness    HPI: Patient is a 68 y.o. male right hand dominant with a chief complaint of left small finger clicking, locking and pain. The symptoms have been going on for several months, he has a history of stiffness of both hands, he is being treated by Dr. Jurado with Plaquenil, he recently had a positive ЮЛИЯ with a speckled pattern. The current symptoms are rated a 5/10 and interfere with ADLs.    ROS/Meds/PSH/PMH/FH/SH: I personally reviewed the patients standard intake form.  Pertinents are discussed in the HPI    Physical Examination:  General: Awake and alert.  HEENT: Normocephalic, atraumatic  CV/Pulm: Breathing even and unlabored  Skin: No obvious rashes noted.  Lymphatic: No obvious evidence of lymphedema or lymphadenopathy    Musculoskeletal:   Examination on the bilateral demonstrates Normal sensation to light touch in the median distribution, normal sensation in ulnar and radial distribution, Negative carpal tunnel compression testing and Phalen testing.  Negative tenderness of the left small A1 pulley with palpable clicking and Positive  locking. The extensor tendons all track well over the MCP joints.  He has stiffness of bilateral hands warts more pronounced on the right hand on the left, stiffness appears to be related to MCP, PIP and DIP joints, he reports that his stiffness improves as the day goes by    Imaging / Electrodiagnostic Tests:     X-rays of bilateral hands were reviewed and independently interpreted, these demonstrate multiple punched-out lesions surrounding the finger joints including the right index finger PIP joint and the left middle finger DIP joint, subluxation without significant osteophytic changes of bilateral thumb CMC joints    Assessment:   1. Acquired trigger finger of left little finger    2.

## 2024-11-13 ENCOUNTER — OFFICE VISIT (OUTPATIENT)
Dept: FAMILY MEDICINE CLINIC | Facility: CLINIC | Age: 69
End: 2024-11-13

## 2024-11-13 VITALS
HEART RATE: 63 BPM | DIASTOLIC BLOOD PRESSURE: 82 MMHG | WEIGHT: 247 LBS | TEMPERATURE: 97.6 F | OXYGEN SATURATION: 97 % | SYSTOLIC BLOOD PRESSURE: 158 MMHG | BODY MASS INDEX: 33.49 KG/M2

## 2024-11-13 DIAGNOSIS — E03.9 ACQUIRED HYPOTHYROIDISM: ICD-10-CM

## 2024-11-13 DIAGNOSIS — I25.118 CORONARY ARTERY DISEASE OF NATIVE ARTERY OF NATIVE HEART WITH STABLE ANGINA PECTORIS (HCC): Chronic | ICD-10-CM

## 2024-11-13 DIAGNOSIS — E13.9 DIABETES MELLITUS TYPE 1.5 (HCC): ICD-10-CM

## 2024-11-13 DIAGNOSIS — N18.31 STAGE 3A CHRONIC KIDNEY DISEASE (HCC): ICD-10-CM

## 2024-11-13 DIAGNOSIS — E78.00 PURE HYPERCHOLESTEROLEMIA: ICD-10-CM

## 2024-11-13 DIAGNOSIS — I10 ESSENTIAL HYPERTENSION: Primary | ICD-10-CM

## 2024-11-13 LAB
ALBUMIN SERPL-MCNC: 4.6 G/DL (ref 3.2–4.6)
ALBUMIN/GLOB SERPL: 1.5 (ref 1–1.9)
ALP SERPL-CCNC: 63 U/L (ref 40–129)
ALT SERPL-CCNC: 45 U/L (ref 8–55)
ANION GAP SERPL CALC-SCNC: 13 MMOL/L (ref 7–16)
AST SERPL-CCNC: 56 U/L (ref 15–37)
BILIRUB SERPL-MCNC: 0.4 MG/DL (ref 0–1.2)
BUN SERPL-MCNC: 23 MG/DL (ref 8–23)
CALCIUM SERPL-MCNC: 10.2 MG/DL (ref 8.8–10.2)
CHLORIDE SERPL-SCNC: 100 MMOL/L (ref 98–107)
CO2 SERPL-SCNC: 25 MMOL/L (ref 20–29)
CREAT SERPL-MCNC: 1.79 MG/DL (ref 0.8–1.3)
GLOBULIN SER CALC-MCNC: 3 G/DL (ref 2.3–3.5)
GLUCOSE SERPL-MCNC: 190 MG/DL (ref 70–99)
POTASSIUM SERPL-SCNC: 5.1 MMOL/L (ref 3.5–5.1)
PROT SERPL-MCNC: 7.6 G/DL (ref 6.3–8.2)
SODIUM SERPL-SCNC: 138 MMOL/L (ref 136–145)

## 2024-11-13 RX ORDER — AMLODIPINE BESYLATE 2.5 MG/1
2.5 TABLET ORAL DAILY
Qty: 30 TABLET | Refills: 3 | Status: SHIPPED | OUTPATIENT
Start: 2024-11-13

## 2024-11-13 RX ORDER — ATORVASTATIN CALCIUM 80 MG/1
80 TABLET, FILM COATED ORAL DAILY
Qty: 90 TABLET | Refills: 1 | Status: SHIPPED | OUTPATIENT
Start: 2024-11-13

## 2024-11-13 RX ORDER — LEVOTHYROXINE SODIUM 175 UG/1
175 TABLET ORAL
Qty: 90 TABLET | Refills: 1 | Status: SHIPPED | OUTPATIENT
Start: 2024-11-13

## 2024-11-13 ASSESSMENT — ENCOUNTER SYMPTOMS
BACK PAIN: 0
SINUS PRESSURE: 0
SHORTNESS OF BREATH: 0
WHEEZING: 0
NAUSEA: 0
BLOOD IN STOOL: 0
CHEST TIGHTNESS: 0
ABDOMINAL PAIN: 0
DIARRHEA: 0
EYE DISCHARGE: 0
CONSTIPATION: 0

## 2024-11-13 NOTE — PROGRESS NOTES
Obinna Bunn is a 68 y.o. male who presents with   Chief Complaint   Patient presents with    Follow-up     Saw rheum for hands, had Mobic and injection. Having echo tomorrow morning. Weight increase since LOV.        History of Present Illness    Seeing ortho and rheum for hand pain.  Brace for finger helped. Last Cr 1.4, improved from 2.0.  BS doing well.  A1C 7.6. Has endo appt next week.     Review of Systems  Review of Systems   Constitutional:  Negative for appetite change, fatigue, fever and unexpected weight change.   HENT:  Negative for congestion and sinus pressure.    Eyes:  Negative for discharge.   Respiratory:  Negative for chest tightness, shortness of breath and wheezing.    Cardiovascular:  Negative for chest pain, palpitations and leg swelling.   Gastrointestinal:  Negative for abdominal pain, blood in stool, constipation, diarrhea and nausea.   Endocrine: Negative for polyuria.   Genitourinary:  Negative for dysuria.   Musculoskeletal:  Negative for back pain and joint swelling.   Skin:  Negative for rash.   Neurological:  Negative for dizziness and headaches.   Hematological:  Negative for adenopathy.   Psychiatric/Behavioral:  Negative for dysphoric mood. The patient is not nervous/anxious.         Medications  Current Outpatient Medications   Medication Sig Dispense Refill    atorvastatin (LIPITOR) 80 MG tablet Take 1 tablet by mouth daily 90 tablet 1    levothyroxine (SYNTHROID) 175 MCG tablet Take 1 tablet by mouth every morning (before breakfast) 90 tablet 1    amLODIPine (NORVASC) 2.5 MG tablet Take 1 tablet by mouth daily 30 tablet 3    meloxicam (MOBIC) 15 MG tablet Take 1 tablet by mouth daily 45 tablet 0    hydroxychloroquine (PLAQUENIL) 200 MG tablet Take 1 tablet by mouth daily for 14 days, THEN 2 tablets daily. 194 tablet 0    allopurinol (ZYLOPRIM) 100 MG tablet Take 1 tablet by mouth daily 90 tablet 3    metoprolol tartrate (LOPRESSOR) 25 MG tablet Take 1 tablet by mouth 2

## 2024-11-15 DIAGNOSIS — R79.89 ELEVATED SERUM CREATININE: Primary | ICD-10-CM

## 2024-11-22 ENCOUNTER — OFFICE VISIT (OUTPATIENT)
Dept: FAMILY MEDICINE CLINIC | Facility: CLINIC | Age: 69
End: 2024-11-22
Payer: MEDICARE

## 2024-11-22 VITALS
DIASTOLIC BLOOD PRESSURE: 72 MMHG | WEIGHT: 230 LBS | HEART RATE: 78 BPM | BODY MASS INDEX: 31.19 KG/M2 | OXYGEN SATURATION: 92 % | TEMPERATURE: 97.2 F | SYSTOLIC BLOOD PRESSURE: 130 MMHG

## 2024-11-22 DIAGNOSIS — J40 BRONCHITIS: Primary | ICD-10-CM

## 2024-11-22 DIAGNOSIS — R09.89 RHONCHI AT RIGHT LUNG BASE: ICD-10-CM

## 2024-11-22 PROCEDURE — 3075F SYST BP GE 130 - 139MM HG: CPT | Performed by: FAMILY MEDICINE

## 2024-11-22 PROCEDURE — 3078F DIAST BP <80 MM HG: CPT | Performed by: FAMILY MEDICINE

## 2024-11-22 PROCEDURE — 1123F ACP DISCUSS/DSCN MKR DOCD: CPT | Performed by: FAMILY MEDICINE

## 2024-11-22 PROCEDURE — 99213 OFFICE O/P EST LOW 20 MIN: CPT | Performed by: FAMILY MEDICINE

## 2024-11-22 PROCEDURE — 1159F MED LIST DOCD IN RCRD: CPT | Performed by: FAMILY MEDICINE

## 2024-11-22 PROCEDURE — 1160F RVW MEDS BY RX/DR IN RCRD: CPT | Performed by: FAMILY MEDICINE

## 2024-11-22 RX ORDER — PREDNISONE 10 MG/1
10 TABLET ORAL 2 TIMES DAILY
Qty: 6 TABLET | Refills: 0 | Status: SHIPPED | OUTPATIENT
Start: 2024-11-22 | End: 2024-11-25

## 2024-11-22 RX ORDER — AZITHROMYCIN 250 MG/1
TABLET, FILM COATED ORAL
Qty: 6 TABLET | Refills: 0 | Status: SHIPPED | OUTPATIENT
Start: 2024-11-22 | End: 2024-12-02

## 2024-11-22 ASSESSMENT — ENCOUNTER SYMPTOMS
WHEEZING: 1
NAUSEA: 0
BLOOD IN STOOL: 0
CHEST TIGHTNESS: 1
SORE THROAT: 1
EYE DISCHARGE: 0
SINUS PRESSURE: 0
BACK PAIN: 0
ABDOMINAL PAIN: 0
DIARRHEA: 0
CONSTIPATION: 0
SHORTNESS OF BREATH: 0

## 2024-11-22 NOTE — PROGRESS NOTES
Obinna Bunn is a 68 y.o. male who presents with   Chief Complaint   Patient presents with    Congestion     X2 days. COVID-19 negative this morning    Ear Pain    Cough       History of Present Illness  Congestion x 2 days.  Daughter and wife sick. Deep cough.  Mostly clear sputum.  Low grade fever.  ST. Covid negative.  Had recent ECHO.  45-50% EF.      Review of Systems  Review of Systems   Constitutional:  Positive for fatigue and fever. Negative for appetite change and unexpected weight change.   HENT:  Positive for congestion and sore throat. Negative for sinus pressure.    Eyes:  Negative for discharge.   Respiratory:  Positive for chest tightness and wheezing. Negative for shortness of breath.    Cardiovascular:  Positive for leg swelling. Negative for chest pain and palpitations.   Gastrointestinal:  Negative for abdominal pain, blood in stool, constipation, diarrhea and nausea.   Endocrine: Negative for polyuria.   Genitourinary:  Negative for dysuria.   Musculoskeletal:  Negative for back pain and joint swelling.   Skin:  Negative for rash.   Neurological:  Negative for dizziness and headaches.   Hematological:  Negative for adenopathy.   Psychiatric/Behavioral:  Negative for dysphoric mood. The patient is not nervous/anxious.         Medications  Current Outpatient Medications   Medication Sig Dispense Refill    predniSONE (DELTASONE) 10 MG tablet Take 1 tablet by mouth 2 times daily for 3 days 6 tablet 0    azithromycin (ZITHROMAX) 250 MG tablet 500mg on day 1 followed by 250mg on days 2 - 5 6 tablet 0    atorvastatin (LIPITOR) 80 MG tablet Take 1 tablet by mouth daily 90 tablet 1    levothyroxine (SYNTHROID) 175 MCG tablet Take 1 tablet by mouth every morning (before breakfast) 90 tablet 1    amLODIPine (NORVASC) 2.5 MG tablet Take 1 tablet by mouth daily 30 tablet 3    meloxicam (MOBIC) 15 MG tablet Take 1 tablet by mouth daily 45 tablet 0    hydroxychloroquine (PLAQUENIL) 200 MG tablet Take 1

## 2024-11-26 DIAGNOSIS — M65.352 ACQUIRED TRIGGER FINGER OF LEFT LITTLE FINGER: ICD-10-CM

## 2024-11-26 RX ORDER — MELOXICAM 15 MG/1
15 TABLET ORAL DAILY
Qty: 30 TABLET | Refills: 1 | OUTPATIENT
Start: 2024-11-26

## 2024-12-12 ENCOUNTER — OFFICE VISIT (OUTPATIENT)
Age: 69
End: 2024-12-12
Payer: MEDICARE

## 2024-12-12 VITALS
WEIGHT: 251 LBS | HEART RATE: 76 BPM | SYSTOLIC BLOOD PRESSURE: 138 MMHG | HEIGHT: 72 IN | BODY MASS INDEX: 34 KG/M2 | DIASTOLIC BLOOD PRESSURE: 78 MMHG

## 2024-12-12 DIAGNOSIS — E78.00 PURE HYPERCHOLESTEROLEMIA: ICD-10-CM

## 2024-12-12 DIAGNOSIS — E11.21 TYPE 2 DIABETES MELLITUS WITH DIABETIC NEPHROPATHY, WITHOUT LONG-TERM CURRENT USE OF INSULIN (HCC): ICD-10-CM

## 2024-12-12 DIAGNOSIS — I35.0 NONRHEUMATIC AORTIC VALVE STENOSIS: Chronic | ICD-10-CM

## 2024-12-12 DIAGNOSIS — I10 ESSENTIAL HYPERTENSION: ICD-10-CM

## 2024-12-12 DIAGNOSIS — I25.118 CORONARY ARTERY DISEASE OF NATIVE ARTERY OF NATIVE HEART WITH STABLE ANGINA PECTORIS (HCC): Primary | Chronic | ICD-10-CM

## 2024-12-12 PROCEDURE — 3078F DIAST BP <80 MM HG: CPT | Performed by: INTERNAL MEDICINE

## 2024-12-12 PROCEDURE — 1123F ACP DISCUSS/DSCN MKR DOCD: CPT | Performed by: INTERNAL MEDICINE

## 2024-12-12 PROCEDURE — 99214 OFFICE O/P EST MOD 30 MIN: CPT | Performed by: INTERNAL MEDICINE

## 2024-12-12 PROCEDURE — 1126F AMNT PAIN NOTED NONE PRSNT: CPT | Performed by: INTERNAL MEDICINE

## 2024-12-12 PROCEDURE — 3075F SYST BP GE 130 - 139MM HG: CPT | Performed by: INTERNAL MEDICINE

## 2024-12-12 ASSESSMENT — ENCOUNTER SYMPTOMS
SHORTNESS OF BREATH: 0
ABDOMINAL PAIN: 0
BACK PAIN: 0
COUGH: 0

## 2024-12-12 NOTE — PROGRESS NOTES
[facility] CARDIOLOGY  [address] [place] [address], [address]  [place] [address]      24      NAME:  Obinna Bunn  : 1955  MRN: [id number]      SUBJECTIVE:   Obinna Bunn is a 68 y.o. male seen for a follow up visit regarding the following:     Chief Complaint   Patient presents with    Hypertension    Coronary Artery Disease       HPI:   68 y.o. male with history of remote CAD and PCI.  He presented to the hospital 2023 with delayed presentation inferolateral myocardial infarction complicated by VT/VF arrest.  Patient underwent CABG x4 with Impella assistance.    He has recovered well and active without CHF or angina.  Echo 2024:  EF 50-55%.  Mild aortic stenosis.          Past Medical History, Past Surgical History, Family history, Social History, and Medications were all reviewed with the patient today and updated as necessary.     Current Outpatient Medications   Medication Sig Dispense Refill    atorvastatin (LIPITOR) 80 MG tablet Take 1 tablet by mouth daily 90 tablet 1    levothyroxine (SYNTHROID) 175 MCG tablet Take 1 tablet by mouth every morning (before breakfast) 90 tablet 1    amLODIPine (NORVASC) 2.5 MG tablet Take 1 tablet by mouth daily 30 tablet 3    hydroxychloroquine (PLAQUENIL) 200 MG tablet Take 1 tablet by mouth daily for 14 days, THEN 2 tablets daily. 194 tablet 0    allopurinol (ZYLOPRIM) 100 MG tablet Take 1 tablet by mouth daily 90 tablet 3    metoprolol tartrate (LOPRESSOR) 25 MG tablet Take 1 tablet by mouth 2 times daily 180 tablet 3    fenofibrate (TRIGLIDE) 160 MG tablet Take 1 tablet by mouth daily 90 tablet 3    triamcinolone (KENALOG) 0.1 % cream Apply topically 2 times daily. 80 g 2    nitroGLYCERIN (NITROSTAT) 0.4 MG SL tablet Use as directed for chest pain 25 tablet 3    acetaminophen (TYLENOL) 325 MG tablet Take 2 tablets by mouth every 6 hours as needed for Pain 120 tablet 3    Insulin Degludec 100 UNIT/ML SOPN Inject 52 Units into the skin nightly

## 2025-02-20 LAB
ESTIMATED AVERAGE GLUCOSE: NORMAL
HBA1C MFR BLD: 7.4 %

## 2025-04-29 RX ORDER — TRIAMCINOLONE ACETONIDE 1 MG/G
CREAM TOPICAL
Qty: 80 G | Refills: 2 | Status: SHIPPED | OUTPATIENT
Start: 2025-04-29

## 2025-04-29 RX ORDER — FENOFIBRATE 160 MG/1
160 TABLET ORAL DAILY
Qty: 90 TABLET | Refills: 3 | Status: SHIPPED | OUTPATIENT
Start: 2025-04-29

## 2025-05-20 NOTE — PROGRESS NOTES
FOOD  [employer] [place] DJFS....[phone] or [phone]  For Food Pantries & Hot Meals, call Information  & Referral at 2-1-1, [phone],  [phone], or www.[address] [place] [address].[employer]  [facility]- (nutrition for women, infants, &  children) ......[phone], [phone], [phone]  MORTGAGE ASSISTANCE  [facility]...........................[phone]....[phone]  Consumer Credit Counseling......[phone]  TRANSPORTATION  [place] [employer]..................... [phone]  [employer] ...............................[phone]  [employer] (seniors)................[phone]  [employer] (medical services, employment)................................  [phone]  EMPLOYMENT SERVICES  [employer] ( disabled )....[phone] or [phone]  Career Advancement (training)........[phone]  [employer]..................[phone]  [employer].........................[phone]  Literacy Center (tutoring, ESOL classes, GED  classes).................[phone] or [phone]  [facility]..........[phone]  [facility]......................................[phone]  [employer]...............[phone]  [employer].......................[phone]  [employer]...................................[phone]  Unemployment Compensation.....[phone]  EMERGENCY HOUSING  [employer]..........................[phone]  [employer] (family and women)..........[phone]  [facility] (men’s shelter).........[phone]  HEALTH CARE  [employer]..........[phone] or [phone]  Health Districts.......[phone] or [phone]  Hospitals:  [facility].[phone]  [facility].......................[phone]  [employer] (adults & children)....[phone]  [facility] Child.....................[phone]  [facility]..............[phone]  [facility] Med Assist.....[phone] or [phone]  COUNSELING/SUPPORT  [employer]......[phone] or [phone]  Consolidated  Pt having increased ectopy with no perfusion. Spoke with Dani Nino NP. Half an amp Lidocaine given per order. Pt responded well with cessation of ectopy.

## 2025-06-08 ENCOUNTER — PATIENT MESSAGE (OUTPATIENT)
Age: 70
End: 2025-06-08

## 2025-06-08 DIAGNOSIS — I25.118 CORONARY ARTERY DISEASE OF NATIVE ARTERY OF NATIVE HEART WITH STABLE ANGINA PECTORIS: Primary | ICD-10-CM

## 2025-06-08 DIAGNOSIS — E11.21 TYPE 2 DIABETES MELLITUS WITH DIABETIC NEPHROPATHY, WITHOUT LONG-TERM CURRENT USE OF INSULIN (HCC): ICD-10-CM

## 2025-06-08 DIAGNOSIS — I10 ESSENTIAL HYPERTENSION: ICD-10-CM

## 2025-06-08 DIAGNOSIS — E78.00 PURE HYPERCHOLESTEROLEMIA: ICD-10-CM

## 2025-06-08 DIAGNOSIS — N17.9 AKI (ACUTE KIDNEY INJURY): ICD-10-CM

## 2025-06-08 DIAGNOSIS — I35.0 NONRHEUMATIC AORTIC VALVE STENOSIS: ICD-10-CM

## 2025-06-12 DIAGNOSIS — E78.00 PURE HYPERCHOLESTEROLEMIA: ICD-10-CM

## 2025-06-12 RX ORDER — ATORVASTATIN CALCIUM 80 MG/1
80 TABLET, FILM COATED ORAL DAILY
Qty: 90 TABLET | Refills: 1 | Status: SHIPPED | OUTPATIENT
Start: 2025-06-12

## 2025-06-18 DIAGNOSIS — I35.0 NONRHEUMATIC AORTIC VALVE STENOSIS: ICD-10-CM

## 2025-06-18 DIAGNOSIS — E11.21 TYPE 2 DIABETES MELLITUS WITH DIABETIC NEPHROPATHY, WITHOUT LONG-TERM CURRENT USE OF INSULIN (HCC): ICD-10-CM

## 2025-06-18 DIAGNOSIS — I25.118 CORONARY ARTERY DISEASE OF NATIVE ARTERY OF NATIVE HEART WITH STABLE ANGINA PECTORIS: ICD-10-CM

## 2025-06-18 DIAGNOSIS — N17.9 AKI (ACUTE KIDNEY INJURY): ICD-10-CM

## 2025-06-18 DIAGNOSIS — I10 ESSENTIAL HYPERTENSION: ICD-10-CM

## 2025-06-18 DIAGNOSIS — E78.00 PURE HYPERCHOLESTEROLEMIA: ICD-10-CM

## 2025-06-18 LAB
ALBUMIN SERPL-MCNC: 3.9 G/DL (ref 3.2–4.6)
ALBUMIN/GLOB SERPL: 1.3 (ref 1–1.9)
ALP SERPL-CCNC: 70 U/L (ref 40–129)
ALT SERPL-CCNC: 39 U/L (ref 8–55)
ANION GAP SERPL CALC-SCNC: 14 MMOL/L (ref 7–16)
AST SERPL-CCNC: 37 U/L (ref 15–37)
BASOPHILS # BLD: 0.01 K/UL (ref 0–0.2)
BASOPHILS NFR BLD: 0.2 % (ref 0–2)
BILIRUB SERPL-MCNC: 0.3 MG/DL (ref 0–1.2)
BUN SERPL-MCNC: 21 MG/DL (ref 8–23)
CALCIUM SERPL-MCNC: 9.7 MG/DL (ref 8.8–10.2)
CHLORIDE SERPL-SCNC: 102 MMOL/L (ref 98–107)
CHOLEST SERPL-MCNC: 155 MG/DL (ref 0–200)
CO2 SERPL-SCNC: 23 MMOL/L (ref 20–29)
CREAT SERPL-MCNC: 1.63 MG/DL (ref 0.8–1.3)
DIFFERENTIAL METHOD BLD: ABNORMAL
EOSINOPHIL # BLD: 0.14 K/UL (ref 0–0.8)
EOSINOPHIL NFR BLD: 3.4 % (ref 0.5–7.8)
ERYTHROCYTE [DISTWIDTH] IN BLOOD BY AUTOMATED COUNT: 13.6 % (ref 11.9–14.6)
GLOBULIN SER CALC-MCNC: 3 G/DL (ref 2.3–3.5)
GLUCOSE SERPL-MCNC: 244 MG/DL (ref 70–99)
HCT VFR BLD AUTO: 38.4 % (ref 41.1–50.3)
HDLC SERPL-MCNC: 34 MG/DL (ref 40–60)
HDLC SERPL: 4.5 (ref 0–5)
HGB BLD-MCNC: 13.3 G/DL (ref 13.6–17.2)
IMM GRANULOCYTES # BLD AUTO: 0.02 K/UL (ref 0–0.5)
IMM GRANULOCYTES NFR BLD AUTO: 0.5 % (ref 0–5)
LDLC SERPL CALC-MCNC: 73 MG/DL (ref 0–100)
LYMPHOCYTES # BLD: 0.79 K/UL (ref 0.5–4.6)
LYMPHOCYTES NFR BLD: 19 % (ref 13–44)
MAGNESIUM SERPL-MCNC: 2.1 MG/DL (ref 1.8–2.4)
MCH RBC QN AUTO: 30.7 PG (ref 26.1–32.9)
MCHC RBC AUTO-ENTMCNC: 34.6 G/DL (ref 31.4–35)
MCV RBC AUTO: 88.7 FL (ref 82–102)
MONOCYTES # BLD: 0.42 K/UL (ref 0.1–1.3)
MONOCYTES NFR BLD: 10.1 % (ref 4–12)
NEUTS SEG # BLD: 2.78 K/UL (ref 1.7–8.2)
NEUTS SEG NFR BLD: 66.8 % (ref 43–78)
NRBC # BLD: 0 K/UL (ref 0–0.2)
PLATELET # BLD AUTO: 182 K/UL (ref 150–450)
PMV BLD AUTO: 10.5 FL (ref 9.4–12.3)
POTASSIUM SERPL-SCNC: 4.6 MMOL/L (ref 3.5–5.1)
PROT SERPL-MCNC: 6.9 G/DL (ref 6.3–8.2)
RBC # BLD AUTO: 4.33 M/UL (ref 4.23–5.6)
SODIUM SERPL-SCNC: 138 MMOL/L (ref 136–145)
TRIGL SERPL-MCNC: 237 MG/DL (ref 0–150)
VLDLC SERPL CALC-MCNC: 47 MG/DL (ref 6–23)
WBC # BLD AUTO: 4.2 K/UL (ref 4.3–11.1)

## 2025-06-25 ENCOUNTER — OFFICE VISIT (OUTPATIENT)
Age: 70
End: 2025-06-25
Payer: MEDICARE

## 2025-06-25 VITALS
SYSTOLIC BLOOD PRESSURE: 130 MMHG | BODY MASS INDEX: 32.37 KG/M2 | HEART RATE: 80 BPM | HEIGHT: 72 IN | DIASTOLIC BLOOD PRESSURE: 78 MMHG | WEIGHT: 239 LBS

## 2025-06-25 DIAGNOSIS — E13.9 DIABETES MELLITUS TYPE 1.5 (HCC): ICD-10-CM

## 2025-06-25 DIAGNOSIS — I35.0 NONRHEUMATIC AORTIC VALVE STENOSIS: Chronic | ICD-10-CM

## 2025-06-25 DIAGNOSIS — N18.32 STAGE 3B CHRONIC KIDNEY DISEASE (HCC): ICD-10-CM

## 2025-06-25 DIAGNOSIS — I10 ESSENTIAL HYPERTENSION: ICD-10-CM

## 2025-06-25 DIAGNOSIS — E78.00 PURE HYPERCHOLESTEROLEMIA: ICD-10-CM

## 2025-06-25 DIAGNOSIS — I25.118 CORONARY ARTERY DISEASE OF NATIVE ARTERY OF NATIVE HEART WITH STABLE ANGINA PECTORIS: Primary | Chronic | ICD-10-CM

## 2025-06-25 PROCEDURE — 3051F HG A1C>EQUAL 7.0%<8.0%: CPT | Performed by: INTERNAL MEDICINE

## 2025-06-25 PROCEDURE — 99214 OFFICE O/P EST MOD 30 MIN: CPT | Performed by: INTERNAL MEDICINE

## 2025-06-25 PROCEDURE — 3075F SYST BP GE 130 - 139MM HG: CPT | Performed by: INTERNAL MEDICINE

## 2025-06-25 PROCEDURE — 1126F AMNT PAIN NOTED NONE PRSNT: CPT | Performed by: INTERNAL MEDICINE

## 2025-06-25 PROCEDURE — 1123F ACP DISCUSS/DSCN MKR DOCD: CPT | Performed by: INTERNAL MEDICINE

## 2025-06-25 PROCEDURE — 3078F DIAST BP <80 MM HG: CPT | Performed by: INTERNAL MEDICINE

## 2025-06-25 RX ORDER — TIRZEPATIDE 15 MG/.5ML
15 INJECTION, SOLUTION SUBCUTANEOUS WEEKLY
COMMUNITY

## 2025-06-25 ASSESSMENT — ENCOUNTER SYMPTOMS
ABDOMINAL PAIN: 0
COUGH: 0
SHORTNESS OF BREATH: 0
BACK PAIN: 0

## 2025-06-25 NOTE — PROGRESS NOTES
Value Date    CHOL 155 06/18/2025    CHOL 159 07/23/2024    CHOL 200 (H) 03/19/2024     Lab Results   Component Value Date    TRIG 237 (H) 06/18/2025    TRIG 171 (H) 07/23/2024    TRIG 511 (H) 03/19/2024     Lab Results   Component Value Date    HDL 34 (L) 06/18/2025    HDL 41 07/23/2024    HDL 32 (L) 03/19/2024     No components found for: \"LDLCHOLESTEROL\", \"LDLCALC\"    Lab Results   Component Value Date    VLDL 47 (H) 06/18/2025    VLDL 34 (H) 07/23/2024    VLDL 102.2 (H) 03/19/2024     Lab Results   Component Value Date    CHOLHDLRATIO 4.5 06/18/2025    CHOLHDLRATIO 3.9 07/23/2024    CHOLHDLRATIO 6.3 03/19/2024        Lab Results   Component Value Date    LABA1C 7.4 02/20/2025     Lab Results   Component Value Date     03/16/2023        Lab Results   Component Value Date     06/18/2025    K 4.6 06/18/2025     06/18/2025    CO2 23 06/18/2025    BUN 21 06/18/2025    CREATININE 1.63 (H) 06/18/2025    GLUCOSE 244 (H) 06/18/2025    CALCIUM 9.7 06/18/2025    BILITOT 0.3 06/18/2025    ALKPHOS 70 06/18/2025    AST 37 06/18/2025    ALT 39 06/18/2025    LABGLOM 46 (L) 06/18/2025    GFRAA 50 (L) 02/11/2021    AGRATIO 2.2 02/11/2021    GLOB 3.0 06/18/2025       Lab Results   Component Value Date/Time     06/18/2025 08:13 AM    K 4.6 06/18/2025 08:13 AM     06/18/2025 08:13 AM    CO2 23 06/18/2025 08:13 AM    BUN 21 06/18/2025 08:13 AM    CREATININE 1.63 06/18/2025 08:13 AM    GLUCOSE 244 06/18/2025 08:13 AM    CALCIUM 9.7 06/18/2025 08:13 AM        Lab Results   Component Value Date    WBC 4.2 (L) 06/18/2025    HGB 13.3 (L) 06/18/2025    HCT 38.4 (L) 06/18/2025    MCV 88.7 06/18/2025     06/18/2025             ASSESSMENT:    Obinna was seen today for follow-up from hospital, hyperlipidemia, hypertension, coronary artery disease and irregular heart beat.    Diagnoses and all orders for this visit:    Coronary artery disease involving native coronary artery of native heart without angina

## 2025-06-28 DIAGNOSIS — I10 ESSENTIAL HYPERTENSION: ICD-10-CM

## 2025-06-30 RX ORDER — AMLODIPINE BESYLATE 2.5 MG/1
2.5 TABLET ORAL DAILY
Qty: 30 TABLET | Refills: 3 | Status: SHIPPED | OUTPATIENT
Start: 2025-06-30

## 2025-07-31 DIAGNOSIS — I10 ESSENTIAL HYPERTENSION: ICD-10-CM

## 2025-08-01 RX ORDER — ALLOPURINOL 100 MG/1
100 TABLET ORAL DAILY
Qty: 90 TABLET | Refills: 3 | Status: SHIPPED | OUTPATIENT
Start: 2025-08-01

## 2025-08-01 RX ORDER — METOPROLOL TARTRATE 25 MG/1
25 TABLET, FILM COATED ORAL 2 TIMES DAILY
Qty: 180 TABLET | Refills: 3 | Status: SHIPPED | OUTPATIENT
Start: 2025-08-01

## (undated) DEVICE — CATHETER COR DIAG PIGTAILS PIG 145 CRV 5FR 110CM 6 SIDE H

## (undated) DEVICE — GUIDE SURG SELECTION FOR GILLINOV COSGROVE LAA EXCLUSION SYS

## (undated) DEVICE — SUTURE PROL SZ 4-0 L30IN NONABSORBABLE BLU SH-1 L22MM 1/2 8526H

## (undated) DEVICE — PRESSURE MONITORING SET: Brand: TRUWAVE, VAMP PLUS

## (undated) DEVICE — CATHETER THOR 24FR L22IN PVC 5 EYELET STR ATRAUM

## (undated) DEVICE — GLIDESHEATH SLENDER STAINLESS STEEL KIT: Brand: GLIDESHEATH SLENDER

## (undated) DEVICE — BAND COMPR L24CM REG CLR PLAS HEMSTAT EXT HK AND LOOP RETEN

## (undated) DEVICE — AGENT HEMSTAT W2XL14IN OXIDIZED REGENERATED CELOS ABSRB FOR

## (undated) DEVICE — Device

## (undated) DEVICE — SYSTEM ENDOSCP VES HARV W/ TOOL CANN SEAL SHT PRT BLNT TIP

## (undated) DEVICE — CATHETER THOR 32FR L23IN PVC 6 EYELET STR ATRAUM

## (undated) DEVICE — AUTOTRANSFUSION KIT 120 MH FAST STRT AT1 FOR CATS +

## (undated) DEVICE — CANISTER, RIGID, 3000CC: Brand: MEDLINE INDUSTRIES, INC.

## (undated) DEVICE — SOLUTION IRRIG 1000ML 0.9% SOD CHL USP POUR PLAS BTL

## (undated) DEVICE — SUTURE CHROMIC GUT SZ 4-0 L27IN ABSRB BRN L26MM SH 1/2 CIR G121H

## (undated) DEVICE — PERCUTANEOUS INSERTION KIT-ARTERIAL: Brand: PERCUTANEOUS INSERTION KIT-ARTERIAL

## (undated) DEVICE — GUIDEWIRE 035IN 210CM PTFE COAT FIX COR J TIP 15MM FIRM BODY

## (undated) DEVICE — SUTURE PROL SZ 6-0 L30IN NONABSORBABLE BLU L13MM CC-1 3/8 M8707

## (undated) DEVICE — MPS® DELIVERY SET WITH 6 FT. DELIVERY TUBING: Brand: MPS

## (undated) DEVICE — RADIFOCUS OPTITORQUE ANGIOGRAPHIC CATHETER: Brand: OPTITORQUE

## (undated) DEVICE — SOLUTION IRRIG 3000ML 0.9% SOD CHL USP UROMATIC PLAS CONT

## (undated) DEVICE — COPILOT BLEEDBACK CONTROL VALVE: Brand: COPILOT

## (undated) DEVICE — SOLUTION IRRIG 1000ML LAC RINGER PLAS POUR BTL

## (undated) DEVICE — SUTURE VCRL SZ 4-0 L27IN ABSRB UD L19MM PS-2 3/8 CIR PRIM J426H

## (undated) DEVICE — SUTURE PERMA-HAND SZ 2-0 L30IN NONABSORBABLE BLK L26MM SH K833H

## (undated) DEVICE — KIT,ANTI FOG,W/SPONGE & FLUID,SOFT PACK: Brand: MEDLINE

## (undated) DEVICE — FLOTRAC SENSOR WITH VAMP SYSTEM 60" / 152CM: Brand: FLOTRAC, VAMP

## (undated) DEVICE — KIT CATH L11.5CM DIA9FR CTRL VEN POLYUR ANTIMIC COAT DBL

## (undated) DEVICE — SUTURE VCRL SZ 3-0 L36IN ABSRB UD L36MM CT-1 1/2 CIR J944H

## (undated) DEVICE — CABG DR WILLIAMS: Brand: MEDLINE INDUSTRIES, INC.

## (undated) DEVICE — PERFUSION PACK XCOATING 76320] TERUMO CARDIOVASCULAR]

## (undated) DEVICE — DRAIN SURG SGL COLL PT TB FOR ATS BG OASIS

## (undated) DEVICE — TTL1LYR 16FR10ML 100%SIL TMPST TR: Brand: MEDLINE

## (undated) DEVICE — CARDINAL HEALTH FLEXIBLE LIGHT HANDLE COVER: Brand: CARDINAL HEALTH